# Patient Record
Sex: FEMALE | Race: WHITE | NOT HISPANIC OR LATINO | Employment: UNEMPLOYED
[De-identification: names, ages, dates, MRNs, and addresses within clinical notes are randomized per-mention and may not be internally consistent; named-entity substitution may affect disease eponyms.]

---

## 2017-04-01 ENCOUNTER — HOSPITAL (OUTPATIENT)
Dept: OTHER | Age: 57
End: 2017-04-01
Attending: FAMILY MEDICINE

## 2017-04-01 ENCOUNTER — DIAGNOSTIC TRANS (OUTPATIENT)
Dept: OTHER | Age: 57
End: 2017-04-01

## 2017-04-01 LAB
ALBUMIN SERPL-MCNC: 3.8 GM/DL (ref 3.6–5.1)
ALBUMIN/GLOB SERPL: 1 {RATIO} (ref 1–2.4)
ALP SERPL-CCNC: 71 UNIT/L (ref 45–117)
ALT SERPL-CCNC: 20 UNIT/L
ANALYZER ANC (IANC): ABNORMAL
ANION GAP SERPL CALC-SCNC: 9 MMOL/L (ref 10–20)
APTT PPP: 28 SECONDS (ref 22–30)
APTT PPP: NORMAL S
AST SERPL-CCNC: 14 UNIT/L
BASOPHILS # BLD: 0 THOUSAND/MCL (ref 0–0.3)
BASOPHILS NFR BLD: 0 %
BILIRUB SERPL-MCNC: 0.5 MG/DL (ref 0.2–1)
BNP SERPL-MCNC: 64 PG/ML
BUN SERPL-MCNC: 10 MG/DL (ref 6–20)
BUN/CREAT SERPL: 13 (ref 7–25)
CALCIUM SERPL-MCNC: 10.1 MG/DL (ref 8.4–10.2)
CHLORIDE: 105 MMOL/L (ref 98–107)
CO2 SERPL-SCNC: 28 MMOL/L (ref 21–32)
CREAT SERPL-MCNC: 0.76 MG/DL (ref 0.51–0.95)
DIFFERENTIAL METHOD BLD: ABNORMAL
EOSINOPHIL # BLD: 0.2 THOUSAND/MCL (ref 0.1–0.5)
EOSINOPHIL NFR BLD: 2 %
ERYTHROCYTE [DISTWIDTH] IN BLOOD: 12.8 % (ref 11–15)
GLOBULIN SER-MCNC: 3.7 GM/DL (ref 2–4)
GLUCOSE SERPL-MCNC: 110 MG/DL (ref 65–99)
HEMATOCRIT: 48.5 % (ref 36–46.5)
HGB BLD-MCNC: 16.1 GM/DL (ref 12–15.5)
INR PPP: 1.1
LYMPHOCYTES # BLD: 2.5 THOUSAND/MCL (ref 1–4)
LYMPHOCYTES NFR BLD: 26 %
MAGNESIUM SERPL-MCNC: 2.2 MG/DL (ref 1.7–2.4)
MCH RBC QN AUTO: 30.2 PG (ref 26–34)
MCHC RBC AUTO-ENTMCNC: 33.2 GM/DL (ref 32–36.5)
MCV RBC AUTO: 91 FL (ref 78–100)
MONOCYTES # BLD: 0.7 THOUSAND/MCL (ref 0.3–0.9)
MONOCYTES NFR BLD: 7 %
NEUTROPHILS # BLD: 6.5 THOUSAND/MCL (ref 1.8–7.7)
NEUTROPHILS NFR BLD: 65 %
NEUTS SEG NFR BLD: ABNORMAL %
PERCENT NRBC: ABNORMAL
PLATELET # BLD: 225 THOUSAND/MCL (ref 140–450)
POTASSIUM SERPL-SCNC: 4.2 MMOL/L (ref 3.4–5.1)
PROT SERPL-MCNC: 7.5 GM/DL (ref 6.4–8.2)
PROTHROMBIN TIME: 11.8 SECONDS (ref 9.7–11.8)
PROTHROMBIN TIME: NORMAL
RBC # BLD: 5.33 MILLION/MCL (ref 4–5.2)
SODIUM SERPL-SCNC: 138 MMOL/L (ref 135–145)
TROPONIN I SERPL HS-MCNC: <0.02 NG/ML
TROPONIN I SERPL HS-MCNC: <0.02 NG/ML
WBC # BLD: 9.9 THOUSAND/MCL (ref 4.2–11)

## 2017-04-02 ENCOUNTER — CHARTING TRANS (OUTPATIENT)
Dept: OTHER | Age: 57
End: 2017-04-02

## 2017-04-02 ENCOUNTER — IMAGING SERVICES (OUTPATIENT)
Dept: OTHER | Age: 57
End: 2017-04-02

## 2017-04-02 LAB
ALBUMIN SERPL-MCNC: 3.3 GM/DL (ref 3.6–5.1)
ALBUMIN/GLOB SERPL: 0.9 {RATIO} (ref 1–2.4)
ALP SERPL-CCNC: 63 UNIT/L (ref 45–117)
ALT SERPL-CCNC: 21 UNIT/L
ANALYZER ANC (IANC): NORMAL
ANION GAP SERPL CALC-SCNC: 15 MMOL/L (ref 10–20)
AST SERPL-CCNC: 17 UNIT/L
BASOPHILS # BLD: 0 THOUSAND/MCL (ref 0–0.3)
BASOPHILS NFR BLD: 0 %
BILIRUB SERPL-MCNC: 0.5 MG/DL (ref 0.2–1)
BUN SERPL-MCNC: 11 MG/DL (ref 6–20)
BUN/CREAT SERPL: 16 (ref 7–25)
CALCIUM SERPL-MCNC: 9.1 MG/DL (ref 8.4–10.2)
CHLORIDE: 105 MMOL/L (ref 98–107)
CO2 SERPL-SCNC: 23 MMOL/L (ref 21–32)
CREAT SERPL-MCNC: 0.69 MG/DL (ref 0.51–0.95)
DIFFERENTIAL METHOD BLD: NORMAL
EOSINOPHIL # BLD: 0.3 THOUSAND/MCL (ref 0.1–0.5)
EOSINOPHIL NFR BLD: 3 %
ERYTHROCYTE [DISTWIDTH] IN BLOOD: 12.8 % (ref 11–15)
GLOBULIN SER-MCNC: 3.6 GM/DL (ref 2–4)
GLUCOSE SERPL-MCNC: 157 MG/DL (ref 65–99)
HEMATOCRIT: 45.6 % (ref 36–46.5)
HGB BLD-MCNC: 14.7 GM/DL (ref 12–15.5)
LYMPHOCYTES # BLD: 3 THOUSAND/MCL (ref 1–4)
LYMPHOCYTES NFR BLD: 33 %
MCH RBC QN AUTO: 29.3 PG (ref 26–34)
MCHC RBC AUTO-ENTMCNC: 32.2 GM/DL (ref 32–36.5)
MCV RBC AUTO: 91 FL (ref 78–100)
MONOCYTES # BLD: 0.5 THOUSAND/MCL (ref 0.3–0.9)
MONOCYTES NFR BLD: 5 %
NEUTROPHILS # BLD: 5.3 THOUSAND/MCL (ref 1.8–7.7)
NEUTROPHILS NFR BLD: 59 %
NEUTS SEG NFR BLD: NORMAL %
PERCENT NRBC: NORMAL
PLATELET # BLD: 215 THOUSAND/MCL (ref 140–450)
POTASSIUM SERPL-SCNC: 3.7 MMOL/L (ref 3.4–5.1)
PROT SERPL-MCNC: 6.9 GM/DL (ref 6.4–8.2)
RBC # BLD: 5.01 MILLION/MCL (ref 4–5.2)
SODIUM SERPL-SCNC: 139 MMOL/L (ref 135–145)
WBC # BLD: 9.1 THOUSAND/MCL (ref 4.2–11)

## 2018-02-22 ENCOUNTER — HOSPITAL (OUTPATIENT)
Dept: OTHER | Age: 58
End: 2018-02-22
Attending: FAMILY MEDICINE

## 2018-03-12 ENCOUNTER — HOSPITAL (OUTPATIENT)
Dept: OTHER | Age: 58
End: 2018-03-12
Attending: FAMILY MEDICINE

## 2018-03-26 ENCOUNTER — HOSPITAL (OUTPATIENT)
Dept: OTHER | Age: 58
End: 2018-03-26
Attending: FAMILY MEDICINE

## 2018-05-06 ENCOUNTER — HOSPITAL (OUTPATIENT)
Dept: OTHER | Age: 58
End: 2018-05-06
Attending: EMERGENCY MEDICINE

## 2018-05-06 ENCOUNTER — LAB SERVICES (OUTPATIENT)
Dept: OTHER | Age: 58
End: 2018-05-06

## 2018-05-06 ENCOUNTER — IMAGING SERVICES (OUTPATIENT)
Dept: OTHER | Age: 58
End: 2018-05-06

## 2018-05-06 ENCOUNTER — DIAGNOSTIC TRANS (OUTPATIENT)
Dept: OTHER | Age: 58
End: 2018-05-06

## 2018-05-06 LAB
ANALYZER ANC (IANC): ABNORMAL
ANALYZER ANC (IANC): ABNORMAL
ANION GAP SERPL CALC-SCNC: 12 MMOL/L (ref 10–20)
ANION GAP SERPL CALC-SCNC: 12 MMOL/L (ref 10–20)
BASOPHILS # BLD: 0.1 K/MCL (ref 0–0.3)
BASOPHILS # BLD: 0.1 THOUSAND/MCL (ref 0–0.3)
BASOPHILS NFR BLD: 0 %
BASOPHILS NFR BLD: 0 %
BNP SERPL-MCNC: 11 PG/ML
BUN SERPL-MCNC: 17 MG/DL (ref 6–20)
BUN SERPL-MCNC: 17 MG/DL (ref 6–20)
BUN/CREAT SERPL: 22 (ref 7–25)
BUN/CREAT SERPL: 22 (ref 7–25)
CALCIUM SERPL-MCNC: 10.2 MG/DL (ref 8.4–10.2)
CALCIUM SERPL-MCNC: 10.2 MG/DL (ref 8.4–10.2)
CHLORIDE SERPL-SCNC: 101 MMOL/L (ref 98–107)
CHLORIDE: 101 MMOL/L (ref 98–107)
CO2 SERPL-SCNC: 29 MMOL/L (ref 21–32)
CO2 SERPL-SCNC: 29 MMOL/L (ref 21–32)
CREAT SERPL-MCNC: 0.78 MG/DL (ref 0.51–0.95)
CREAT SERPL-MCNC: 0.78 MG/DL (ref 0.51–0.95)
D DIMER PPP FEU-MCNC: 0.24 MG/L FEU
DIFFERENTIAL METHOD BLD: ABNORMAL
DIFFERENTIAL METHOD BLD: ABNORMAL
EOSINOPHIL # BLD: 0.5 K/MCL (ref 0.1–0.5)
EOSINOPHIL # BLD: 0.5 THOUSAND/MCL (ref 0.1–0.5)
EOSINOPHIL NFR BLD: 3 %
EOSINOPHIL NFR BLD: 3 %
ERYTHROCYTE [DISTWIDTH] IN BLOOD: 13.3 % (ref 11–15)
ERYTHROCYTE [DISTWIDTH] IN BLOOD: 13.3 % (ref 11–15)
GLUCOSE SERPL-MCNC: 123 MG/DL (ref 65–99)
GLUCOSE SERPL-MCNC: 123 MG/DL (ref 65–99)
HEMATOCRIT: 44.6 % (ref 36–46.5)
HEMATOCRIT: 44.6 % (ref 36–46.5)
HEMOGLOBIN: 15 G/DL (ref 12–15.5)
HGB BLD-MCNC: 15 GM/DL (ref 12–15.5)
IMM GRANULOCYTES # BLD AUTO: 0.1 K/MCL (ref 0–0.2)
IMM GRANULOCYTES # BLD AUTO: 0.1 THOUSAND/MCL (ref 0–0.2)
IMM GRANULOCYTES NFR BLD: 0 %
IMM GRANULOCYTES NFR BLD: 0 %
LYMPHOCYTES # BLD: 1.8 K/MCL (ref 1–4)
LYMPHOCYTES # BLD: 1.8 THOUSAND/MCL (ref 1–4)
LYMPHOCYTES NFR BLD: 10 %
LYMPHOCYTES NFR BLD: 10 %
MCH RBC QN AUTO: 30 PG (ref 26–34)
MCHC RBC AUTO-ENTMCNC: 33.6 GM/DL (ref 32–36.5)
MCV RBC AUTO: 89.2 FL (ref 78–100)
MEAN CORPUSCULAR HEMOGLOBIN: 30 PG (ref 26–34)
MEAN CORPUSCULAR HGB CONC: 33.6 G/DL (ref 32–36.5)
MEAN CORPUSCULAR VOLUME: 89.2 FL (ref 78–100)
MONOCYTES # BLD: 0.9 K/MCL (ref 0.3–0.9)
MONOCYTES # BLD: 0.9 THOUSAND/MCL (ref 0.3–0.9)
MONOCYTES NFR BLD: 5 %
MONOCYTES NFR BLD: 5 %
NEUTROPHILS # BLD: 15.3 K/MCL (ref 1.8–7.7)
NEUTROPHILS # BLD: 15.3 THOUSAND/MCL (ref 1.8–7.7)
NEUTROPHILS NFR BLD: 82 %
NEUTROPHILS NFR BLD: 82 %
NEUTS SEG NFR BLD: ABNORMAL
NEUTS SEG NFR BLD: ABNORMAL %
NRBC (NRBCRE): 0 %
PERCENT NRBC: 0 %
PLATELET # BLD: 301 THOUSAND/MCL (ref 140–450)
PLATELET COUNT: 301 K/MCL (ref 140–450)
POTASSIUM SERPL-SCNC: 3.3 MMOL/L (ref 3.4–5.1)
POTASSIUM SERPL-SCNC: 3.3 MMOL/L (ref 3.4–5.1)
RBC # BLD: 5 MILLION/MCL (ref 4–5.2)
RED CELL COUNT: 5 MIL/MCL (ref 4–5.2)
SODIUM SERPL-SCNC: 139 MMOL/L (ref 135–145)
SODIUM SERPL-SCNC: 139 MMOL/L (ref 135–145)
TROPONIN I SERPL HS-MCNC: <0.02 NG/ML
TROPONIN I SERPL HS-MCNC: <0.02 NG/ML
WBC # BLD: 18.7 THOUSAND/MCL (ref 4.2–11)
WHITE BLOOD COUNT: 18.7 K/MCL (ref 4.2–11)

## 2018-08-21 ENCOUNTER — HOSPITAL (OUTPATIENT)
Dept: OTHER | Age: 58
End: 2018-08-21
Attending: GENERAL PRACTICE

## 2018-08-21 LAB
ALBUMIN SERPL-MCNC: 3.8 GM/DL (ref 3.6–5.1)
ALBUMIN/GLOB SERPL: 0.9 {RATIO} (ref 1–2.4)
ALP SERPL-CCNC: 67 UNIT/L (ref 45–117)
ALT SERPL-CCNC: 29 UNIT/L
ANALYZER ANC (IANC): ABNORMAL
ANION GAP SERPL CALC-SCNC: 8 MMOL/L (ref 10–20)
AST SERPL-CCNC: 19 UNIT/L
BASOPHILS # BLD: 0.1 THOUSAND/MCL (ref 0–0.3)
BASOPHILS NFR BLD: 0 %
BILIRUB SERPL-MCNC: 0.4 MG/DL (ref 0.2–1)
BUN SERPL-MCNC: 11 MG/DL (ref 6–20)
BUN/CREAT SERPL: 12 (ref 7–25)
CALCIUM SERPL-MCNC: 9.4 MG/DL (ref 8.4–10.2)
CHLORIDE: 107 MMOL/L (ref 98–107)
CO2 SERPL-SCNC: 27 MMOL/L (ref 21–32)
CREAT SERPL-MCNC: 0.88 MG/DL (ref 0.51–0.95)
D DIMER PPP FEU-MCNC: 0.27 MG/L FEU
DIFFERENTIAL METHOD BLD: ABNORMAL
EOSINOPHIL # BLD: 0.4 THOUSAND/MCL (ref 0.1–0.5)
EOSINOPHIL NFR BLD: 2 %
ERYTHROCYTE [DISTWIDTH] IN BLOOD: 13.7 % (ref 11–15)
GLOBULIN SER-MCNC: 4.2 GM/DL (ref 2–4)
GLUCOSE SERPL-MCNC: 117 MG/DL (ref 65–99)
HEMATOCRIT: 44.9 % (ref 36–46.5)
HGB BLD-MCNC: 14.9 GM/DL (ref 12–15.5)
IMM GRANULOCYTES # BLD AUTO: 0.1 THOUSAND/MCL (ref 0–0.2)
IMM GRANULOCYTES NFR BLD: 0 %
LYMPHOCYTES # BLD: 2.2 THOUSAND/MCL (ref 1–4)
LYMPHOCYTES NFR BLD: 12 %
MCH RBC QN AUTO: 29.7 PG (ref 26–34)
MCHC RBC AUTO-ENTMCNC: 33.2 GM/DL (ref 32–36.5)
MCV RBC AUTO: 89.6 FL (ref 78–100)
MONOCYTES # BLD: 0.9 THOUSAND/MCL (ref 0.3–0.9)
MONOCYTES NFR BLD: 5 %
NEUTROPHILS # BLD: 14.9 THOUSAND/MCL (ref 1.8–7.7)
NEUTROPHILS NFR BLD: 81 %
NEUTS SEG NFR BLD: ABNORMAL %
NRBC (NRBCRE): 0 /100 WBC
NT-PROBNP SERPL-MCNC: 472 PG/ML
PLATELET # BLD: 236 THOUSAND/MCL (ref 140–450)
POTASSIUM SERPL-SCNC: 3.5 MMOL/L (ref 3.4–5.1)
PROCALCITONIN SERPL IA-MCNC: <0.05 NG/ML
PROT SERPL-MCNC: 8 GM/DL (ref 6.4–8.2)
RBC # BLD: 5.01 MILLION/MCL (ref 4–5.2)
SODIUM SERPL-SCNC: 139 MMOL/L (ref 135–145)
TROPONIN I SERPL HS-MCNC: <0.02 NG/ML
TROPONIN I SERPL HS-MCNC: <0.02 NG/ML
WBC # BLD: 18.6 THOUSAND/MCL (ref 4.2–11)

## 2018-08-22 ENCOUNTER — DIAGNOSTIC TRANS (OUTPATIENT)
Dept: OTHER | Age: 58
End: 2018-08-22

## 2018-08-22 ENCOUNTER — IMAGING SERVICES (OUTPATIENT)
Dept: OTHER | Age: 58
End: 2018-08-22

## 2018-08-22 LAB
AMORPH SED URNS QL MICRO: ABNORMAL
ANALYZER ANC (IANC): ABNORMAL
ANION GAP SERPL CALC-SCNC: 11 MMOL/L (ref 10–20)
APPEARANCE UR: ABNORMAL
BACTERIA #/AREA URNS HPF: ABNORMAL /HPF
BASOPHILS # BLD: 0 THOUSAND/MCL (ref 0–0.3)
BASOPHILS NFR BLD: 0 %
BILIRUB UR QL: NEGATIVE
BUN SERPL-MCNC: 13 MG/DL (ref 6–20)
BUN/CREAT SERPL: 16 (ref 7–25)
CALCIUM SERPL-MCNC: 9.8 MG/DL (ref 8.4–10.2)
CAOX CRY URNS QL MICRO: ABNORMAL
CHLORIDE: 107 MMOL/L (ref 98–107)
CHOLEST SERPL-MCNC: 202 MG/DL
CHOLEST/HDLC SERPL: 2.9 {RATIO}
CO2 SERPL-SCNC: 25 MMOL/L (ref 21–32)
COLOR UR: YELLOW
CREAT SERPL-MCNC: 0.8 MG/DL (ref 0.51–0.95)
DIFFERENTIAL METHOD BLD: ABNORMAL
EOSINOPHIL # BLD: 0 THOUSAND/MCL (ref 0.1–0.5)
EOSINOPHIL NFR BLD: 0 %
EPITH CASTS #/AREA URNS LPF: ABNORMAL /[LPF]
ERYTHROCYTE [DISTWIDTH] IN BLOOD: 13.8 % (ref 11–15)
FATTY CASTS #/AREA URNS LPF: ABNORMAL /[LPF]
GLUCOSE SERPL-MCNC: 165 MG/DL (ref 65–99)
GLUCOSE UR-MCNC: 150 MG/DL
GRAN CASTS #/AREA URNS LPF: ABNORMAL /[LPF]
HDLC SERPL-MCNC: 69 MG/DL
HEMATOCRIT: 42.5 % (ref 36–46.5)
HGB BLD-MCNC: 13.7 GM/DL (ref 12–15.5)
HGB UR QL: ABNORMAL
HYALINE CASTS #/AREA URNS LPF: ABNORMAL /LPF (ref 0–5)
IMM GRANULOCYTES # BLD AUTO: 0.2 THOUSAND/MCL (ref 0–0.2)
IMM GRANULOCYTES NFR BLD: 1 %
KETONES UR-MCNC: NEGATIVE MG/DL
LDLC SERPL CALC-MCNC: 118 MG/DL
LEUKOCYTE ESTERASE UR QL STRIP: ABNORMAL
LYMPHOCYTES # BLD: 1.6 THOUSAND/MCL (ref 1–4)
LYMPHOCYTES NFR BLD: 7 %
MAGNESIUM SERPL-MCNC: 2.3 MG/DL (ref 1.7–2.4)
MCH RBC QN AUTO: 29 PG (ref 26–34)
MCHC RBC AUTO-ENTMCNC: 32.2 GM/DL (ref 32–36.5)
MCV RBC AUTO: 90 FL (ref 78–100)
MIXED CELL CASTS #/AREA URNS LPF: ABNORMAL /[LPF]
MONOCYTES # BLD: 0.2 THOUSAND/MCL (ref 0.3–0.9)
MONOCYTES NFR BLD: 1 %
MUCOUS THREADS URNS QL MICRO: PRESENT
NEUTROPHILS # BLD: 20.1 THOUSAND/MCL (ref 1.8–7.7)
NEUTROPHILS NFR BLD: 91 %
NEUTS SEG NFR BLD: ABNORMAL %
NITRITE UR QL: NEGATIVE
NONHDLC SERPL-MCNC: 133 MG/DL
NRBC (NRBCRE): 0 /100 WBC
PH UR: 6 UNIT (ref 5–7)
PLATELET # BLD: 208 THOUSAND/MCL (ref 140–450)
POTASSIUM SERPL-SCNC: 3.9 MMOL/L (ref 3.4–5.1)
PROT UR QL: NEGATIVE MG/DL
RBC # BLD: 4.72 MILLION/MCL (ref 4–5.2)
RBC #/AREA URNS HPF: ABNORMAL /HPF (ref 0–3)
RBC CASTS #/AREA URNS LPF: ABNORMAL /[LPF]
RENAL EPI CELLS #/AREA URNS HPF: ABNORMAL /[HPF]
SODIUM SERPL-SCNC: 139 MMOL/L (ref 135–145)
SP GR UR: 1.02 (ref 1–1.03)
SPECIMEN SOURCE: ABNORMAL
SPERM URNS QL MICRO: ABNORMAL
SQUAMOUS #/AREA URNS HPF: ABNORMAL /HPF (ref 0–5)
T VAGINALIS URNS QL MICRO: ABNORMAL
TRI-PHOS CRY URNS QL MICRO: ABNORMAL
TRIGLYCERIDE (TRIGP): 76 MG/DL
URATE CRY URNS QL MICRO: ABNORMAL
UROBILINOGEN UR QL: 0.2 MG/DL (ref 0–1)
WAXY CASTS #/AREA URNS LPF: ABNORMAL /[LPF]
WBC # BLD: 22.1 THOUSAND/MCL (ref 4.2–11)
WBC #/AREA URNS HPF: ABNORMAL /HPF (ref 0–5)
WBC CASTS #/AREA URNS LPF: ABNORMAL /[LPF]
YEAST HYPHAE URNS QL MICRO: ABNORMAL
YEAST URNS QL MICRO: ABNORMAL

## 2018-08-23 ENCOUNTER — IMAGING SERVICES (OUTPATIENT)
Dept: OTHER | Age: 58
End: 2018-08-23

## 2018-08-23 LAB
ANALYZER ANC (IANC): ABNORMAL
BASOPHILS # BLD: 0 THOUSAND/MCL (ref 0–0.3)
BASOPHILS NFR BLD: 0 %
DIFFERENTIAL METHOD BLD: ABNORMAL
EOSINOPHIL # BLD: 0 THOUSAND/MCL (ref 0.1–0.5)
EOSINOPHIL NFR BLD: 0 %
ERYTHROCYTE [DISTWIDTH] IN BLOOD: 14 % (ref 11–15)
HEMATOCRIT: 43.1 % (ref 36–46.5)
HGB BLD-MCNC: 13.7 GM/DL (ref 12–15.5)
IMM GRANULOCYTES # BLD AUTO: 0.3 THOUSAND/MCL (ref 0–0.2)
IMM GRANULOCYTES NFR BLD: 1 %
LYMPHOCYTES # BLD: 1.9 THOUSAND/MCL (ref 1–4)
LYMPHOCYTES NFR BLD: 7 %
MCH RBC QN AUTO: 28.9 PG (ref 26–34)
MCHC RBC AUTO-ENTMCNC: 31.8 GM/DL (ref 32–36.5)
MCV RBC AUTO: 90.9 FL (ref 78–100)
MONOCYTES # BLD: 0.8 THOUSAND/MCL (ref 0.3–0.9)
MONOCYTES NFR BLD: 3 %
NEUTROPHILS # BLD: 22.5 THOUSAND/MCL (ref 1.8–7.7)
NEUTROPHILS NFR BLD: 89 %
NEUTS SEG NFR BLD: ABNORMAL %
NRBC (NRBCRE): 0 /100 WBC
PLATELET # BLD: 249 THOUSAND/MCL (ref 140–450)
RBC # BLD: 4.74 MILLION/MCL (ref 4–5.2)
WBC # BLD: 25.5 THOUSAND/MCL (ref 4.2–11)

## 2019-02-21 ENCOUNTER — HOSPITAL (OUTPATIENT)
Dept: OTHER | Age: 59
End: 2019-02-21
Attending: EMERGENCY MEDICINE

## 2019-02-21 ENCOUNTER — DIAGNOSTIC TRANS (OUTPATIENT)
Dept: OTHER | Age: 59
End: 2019-02-21

## 2019-02-21 LAB
ALBUMIN SERPL-MCNC: 3.7 GM/DL (ref 3.6–5.1)
ALBUMIN/GLOB SERPL: 0.9 {RATIO} (ref 1–2.4)
ALP SERPL-CCNC: 65 UNIT/L (ref 45–117)
ALT SERPL-CCNC: 29 UNIT/L
ANALYZER ANC (IANC): ABNORMAL
ANION GAP SERPL CALC-SCNC: 13 MMOL/L (ref 10–20)
APTT PPP: 24 SECONDS (ref 22–32)
APTT PPP: NORMAL S
AST SERPL-CCNC: 31 UNIT/L
BASE DEFICIT BLDA-SCNC: 1 MMOL/L (ref 0–2)
BASE EXCESS BLDA CALC-SCNC: ABNORMAL MMOL/L
BASOPHILS # BLD: 0.1 THOUSAND/MCL (ref 0–0.3)
BASOPHILS NFR BLD: 0 %
BDY SITE: ABNORMAL
BILIRUB SERPL-MCNC: 0.6 MG/DL (ref 0.2–1)
BODY TEMPERATURE: 37 DEGREES
BUN SERPL-MCNC: 12 MG/DL (ref 6–20)
BUN/CREAT SERPL: 15 (ref 7–25)
CA-I BLD ISE-SCNC: 1.26 MMOL/L (ref 1.15–1.29)
CA-I BLDA-SCNC: 19 % (ref 15–23)
CALCIUM SERPL-MCNC: 9.1 MG/DL (ref 8.4–10.2)
CHLORIDE: 107 MMOL/L (ref 98–107)
CO2 SERPL-SCNC: 23 MMOL/L (ref 21–32)
COHGB MFR BLD: 1.5 %
CONDITION: ABNORMAL
CONDITION: ABNORMAL
CREAT SERPL-MCNC: 0.78 MG/DL (ref 0.51–0.95)
D DIMER PPP FEU-MCNC: 0.42 MG/L FEU
DIFFERENTIAL METHOD BLD: ABNORMAL
EOSINOPHIL # BLD: 0.4 THOUSAND/MCL (ref 0.1–0.5)
EOSINOPHIL NFR BLD: 2 %
ERYTHROCYTE [DISTWIDTH] IN BLOOD: 13.4 % (ref 11–15)
GLOBULIN SER-MCNC: 4 GM/DL (ref 2–4)
GLUCOSE SERPL-MCNC: 115 MG/DL (ref 65–99)
HCO3 BLDA-SCNC: 23 MMOL/L (ref 22–28)
HEMATOCRIT: 41.9 % (ref 36–46.5)
HGB BLD-MCNC: 13.9 GM/DL (ref 12–15.5)
HGB BLD-MCNC: 13.9 GM/DL (ref 12–15.5)
HOROWITZ INDEX BLD+IHG-RTO: ABNORMAL MM[HG]
IMM GRANULOCYTES # BLD AUTO: 0.1 THOUSAND/MCL (ref 0–0.2)
IMM GRANULOCYTES NFR BLD: 1 %
INR PPP: 1
LACTATE BLDA-MCNC: 1.9 MMOL/L
LACTATE BLDV-SCNC: 1.8 MMOL/L (ref 0–2)
LYMPHOCYTES # BLD: 2.4 THOUSAND/MCL (ref 1–4)
LYMPHOCYTES NFR BLD: 13 %
MAGNESIUM SERPL-MCNC: 2.1 MG/DL (ref 1.7–2.4)
MCH RBC QN AUTO: 29.5 PG (ref 26–34)
MCHC RBC AUTO-ENTMCNC: 33.2 GM/DL (ref 32–36.5)
MCV RBC AUTO: 89 FL (ref 78–100)
METHGB MFR BLD: 1.1 %
MONOCYTES # BLD: 0.9 THOUSAND/MCL (ref 0.3–0.9)
MONOCYTES NFR BLD: 5 %
NEUTROPHILS # BLD: 14.6 THOUSAND/MCL (ref 1.8–7.7)
NEUTROPHILS NFR BLD: 79 %
NEUTS SEG NFR BLD: ABNORMAL %
NRBC (NRBCRE): 0 /100 WBC
NT-PROBNP SERPL-MCNC: 249 PG/ML
OXYHGB MFR BLD: 96.8 % (ref 94–98)
PAO2 / FIO2 RATIO (RPFR): ABNORMAL
PCO2 BLDA: 36 MM HG (ref 32–45)
PH BLDA: 7.42 UNIT (ref 7.35–7.45)
PLATELET # BLD: 234 THOUSAND/MCL (ref 140–450)
PO2 BLDA: 127 MM HG (ref 83–108)
POTASSIUM BLD-SCNC: 3.1 MMOL/L (ref 3.4–5.1)
POTASSIUM SERPL-SCNC: 4 MMOL/L (ref 3.4–5.1)
PROT SERPL-MCNC: 7.7 GM/DL (ref 6.4–8.2)
PROTHROMBIN TIME: 10.4 SECONDS (ref 9.7–11.8)
PROTHROMBIN TIME: NORMAL
RBC # BLD: 4.71 MILLION/MCL (ref 4–5.2)
SAO2 % BLDA: 100 % (ref 95–99)
SODIUM BLD-SCNC: 138 MMOL/L (ref 135–145)
SODIUM SERPL-SCNC: 139 MMOL/L (ref 135–145)
TROPONIN I SERPL HS-MCNC: <0.02 NG/ML
TROPONIN I SERPL HS-MCNC: <0.02 NG/ML
TSH SERPL-ACNC: 2.23 MCUNIT/ML (ref 0.35–5)
WBC # BLD: 18.5 THOUSAND/MCL (ref 4.2–11)

## 2019-03-02 ENCOUNTER — DIAGNOSTIC TRANS (OUTPATIENT)
Dept: OTHER | Age: 59
End: 2019-03-02

## 2019-03-03 ENCOUNTER — HOSPITAL (OUTPATIENT)
Dept: OTHER | Age: 59
End: 2019-03-03

## 2019-03-03 ENCOUNTER — HOSPITAL (OUTPATIENT)
Dept: OTHER | Age: 59
End: 2019-03-03
Attending: HOSPITALIST

## 2019-03-03 LAB
ALBUMIN SERPL-MCNC: 3.6 GM/DL (ref 3.6–5.1)
ALBUMIN SERPL-MCNC: 3.7 GM/DL (ref 3.6–5.1)
ALBUMIN/GLOB SERPL: 0.9 {RATIO} (ref 1–2.4)
ALBUMIN/GLOB SERPL: 0.9 {RATIO} (ref 1–2.4)
ALP SERPL-CCNC: 67 UNIT/L (ref 45–117)
ALP SERPL-CCNC: 68 UNIT/L (ref 45–117)
ALT SERPL-CCNC: 32 UNIT/L
ALT SERPL-CCNC: 32 UNIT/L
ANALYZER ANC (IANC): ABNORMAL
ANALYZER ANC (IANC): ABNORMAL
ANION GAP SERPL CALC-SCNC: 10 MMOL/L (ref 10–20)
ANION GAP SERPL CALC-SCNC: 10 MMOL/L (ref 10–20)
AST SERPL-CCNC: 11 UNIT/L
AST SERPL-CCNC: 13 UNIT/L
BASE DEFICIT BLDA-SCNC: ABNORMAL MMOL/L
BASE EXCESS BLDA CALC-SCNC: 4 MMOL/L (ref 0–3)
BASOPHILS # BLD: 0 THOUSAND/MCL (ref 0–0.3)
BASOPHILS # BLD: 0.1 THOUSAND/MCL (ref 0–0.3)
BASOPHILS NFR BLD: 0 %
BASOPHILS NFR BLD: 0 %
BDY SITE: ABNORMAL
BILIRUB SERPL-MCNC: 0.6 MG/DL (ref 0.2–1)
BILIRUB SERPL-MCNC: 0.8 MG/DL (ref 0.2–1)
BODY TEMPERATURE: 37 DEGREES
BUN SERPL-MCNC: 12 MG/DL (ref 6–20)
BUN SERPL-MCNC: 16 MG/DL (ref 6–20)
BUN/CREAT SERPL: 17 (ref 7–25)
BUN/CREAT SERPL: 23 (ref 7–25)
CA-I BLD ISE-SCNC: 1.28 MMOL/L (ref 1.15–1.29)
CA-I BLDA-SCNC: 21 % (ref 15–23)
CALCIUM SERPL-MCNC: 9.7 MG/DL (ref 8.4–10.2)
CALCIUM SERPL-MCNC: 9.7 MG/DL (ref 8.4–10.2)
CHLORIDE: 105 MMOL/L (ref 98–107)
CHLORIDE: 105 MMOL/L (ref 98–107)
CO2 SERPL-SCNC: 26 MMOL/L (ref 21–32)
CO2 SERPL-SCNC: 28 MMOL/L (ref 21–32)
COHGB MFR BLD: 2.2 %
CONDITION: ABNORMAL
CONDITION: ABNORMAL
CREAT SERPL-MCNC: 0.68 MG/DL (ref 0.51–0.95)
CREAT SERPL-MCNC: 0.72 MG/DL (ref 0.51–0.95)
DIFFERENTIAL METHOD BLD: ABNORMAL
DIFFERENTIAL METHOD BLD: ABNORMAL
EOSINOPHIL # BLD: 0 THOUSAND/MCL (ref 0.1–0.5)
EOSINOPHIL # BLD: 1.3 THOUSAND/MCL (ref 0.1–0.5)
EOSINOPHIL NFR BLD: 0 %
EOSINOPHIL NFR BLD: 5 %
ERYTHROCYTE [DISTWIDTH] IN BLOOD: 14 % (ref 11–15)
ERYTHROCYTE [DISTWIDTH] IN BLOOD: 14.1 % (ref 11–15)
GLOBULIN SER-MCNC: 3.9 GM/DL (ref 2–4)
GLOBULIN SER-MCNC: 4.1 GM/DL (ref 2–4)
GLUCOSE BLDC GLUCOMTR-MCNC: 153 MG/DL (ref 65–99)
GLUCOSE SERPL-MCNC: 108 MG/DL (ref 65–99)
GLUCOSE SERPL-MCNC: 167 MG/DL (ref 65–99)
HCO3 BLDA-SCNC: 28 MMOL/L (ref 22–28)
HEMATOCRIT: 44.4 % (ref 36–46.5)
HEMATOCRIT: 45.8 % (ref 36–46.5)
HGB BLD-MCNC: 14.8 GM/DL (ref 12–15.5)
HGB BLD-MCNC: 14.9 GM/DL (ref 12–15.5)
HGB BLD-MCNC: 15.2 GM/DL (ref 12–15.5)
HOROWITZ INDEX BLD+IHG-RTO: ABNORMAL MM[HG]
IMM GRANULOCYTES # BLD AUTO: 0.2 THOUSAND/MCL (ref 0–0.2)
IMM GRANULOCYTES # BLD AUTO: 0.3 THOUSAND/MCL (ref 0–0.2)
IMM GRANULOCYTES NFR BLD: 1 %
IMM GRANULOCYTES NFR BLD: 1 %
LACTATE BLDA-MCNC: 1.3 MMOL/L
LYMPHOCYTES # BLD: 1.7 THOUSAND/MCL (ref 1–4)
LYMPHOCYTES # BLD: 2.8 THOUSAND/MCL (ref 1–4)
LYMPHOCYTES NFR BLD: 11 %
LYMPHOCYTES NFR BLD: 8 %
MAGNESIUM SERPL-MCNC: 2.2 MG/DL (ref 1.7–2.4)
MCH RBC QN AUTO: 29.7 PG (ref 26–34)
MCH RBC QN AUTO: 29.9 PG (ref 26–34)
MCHC RBC AUTO-ENTMCNC: 33.2 GM/DL (ref 32–36.5)
MCHC RBC AUTO-ENTMCNC: 33.3 GM/DL (ref 32–36.5)
MCV RBC AUTO: 89 FL (ref 78–100)
MCV RBC AUTO: 90.2 FL (ref 78–100)
METHGB MFR BLD: 0.5 %
MONOCYTES # BLD: 0.3 THOUSAND/MCL (ref 0.3–0.9)
MONOCYTES # BLD: 1.4 THOUSAND/MCL (ref 0.3–0.9)
MONOCYTES NFR BLD: 1 %
MONOCYTES NFR BLD: 6 %
NEUTROPHILS # BLD: 19.3 THOUSAND/MCL (ref 1.8–7.7)
NEUTROPHILS # BLD: 20.7 THOUSAND/MCL (ref 1.8–7.7)
NEUTROPHILS NFR BLD: 77 %
NEUTROPHILS NFR BLD: 90 %
NEUTS SEG NFR BLD: ABNORMAL %
NEUTS SEG NFR BLD: ABNORMAL %
NRBC (NRBCRE): 0 /100 WBC
NRBC (NRBCRE): 0 /100 WBC
NT-PROBNP SERPL-MCNC: 44 PG/ML
OXYHGB MFR BLD: 97.3 % (ref 94–98)
PAO2 / FIO2 RATIO (RPFR): 308 (ref 300–500)
PATHOLOGIST NAME: NORMAL
PCO2 BLDA: 39 MM HG (ref 32–45)
PH BLDA: 7.46 UNIT (ref 7.35–7.45)
PLATELET # BLD: 258 THOUSAND/MCL (ref 140–450)
PLATELET # BLD: 258 THOUSAND/MCL (ref 140–450)
PO2 BLDA: 185 MM HG (ref 83–108)
POTASSIUM BLD-SCNC: 3.6 MMOL/L (ref 3.4–5.1)
POTASSIUM SERPL-SCNC: 3.6 MMOL/L (ref 3.4–5.1)
POTASSIUM SERPL-SCNC: 3.8 MMOL/L (ref 3.4–5.1)
PROCALCITONIN SERPL IA-MCNC: <0.05 NG/ML
PROT SERPL-MCNC: 7.6 GM/DL (ref 6.4–8.2)
PROT SERPL-MCNC: 7.7 GM/DL (ref 6.4–8.2)
RBC # BLD: 4.99 MILLION/MCL (ref 4–5.2)
RBC # BLD: 5.08 MILLION/MCL (ref 4–5.2)
SAO2 % BLDA: 100 % (ref 95–99)
SMEAR/PATHOLOGY EVALUATION: NORMAL
SODIUM BLD-SCNC: 137 MMOL/L (ref 135–145)
SODIUM SERPL-SCNC: 137 MMOL/L (ref 135–145)
SODIUM SERPL-SCNC: 139 MMOL/L (ref 135–145)
TROPONIN I SERPL HS-MCNC: <0.02 NG/ML
TROPONIN I SERPL HS-MCNC: <0.02 NG/ML
WBC # BLD: 23.1 THOUSAND/MCL (ref 4.2–11)
WBC # BLD: 25 THOUSAND/MCL (ref 4.2–11)

## 2019-03-04 LAB
ALBUMIN SERPL-MCNC: 3.2 GM/DL (ref 3.6–5.1)
ALBUMIN/GLOB SERPL: 0.9 {RATIO} (ref 1–2.4)
ALP SERPL-CCNC: 63 UNIT/L (ref 45–117)
ALT SERPL-CCNC: 26 UNIT/L
ANALYZER ANC (IANC): ABNORMAL
ANION GAP SERPL CALC-SCNC: 10 MMOL/L (ref 10–20)
AST SERPL-CCNC: 10 UNIT/L
BASOPHILS # BLD: 0 THOUSAND/MCL (ref 0–0.3)
BASOPHILS NFR BLD: 0 %
BILIRUB SERPL-MCNC: 0.4 MG/DL (ref 0.2–1)
BUN SERPL-MCNC: 19 MG/DL (ref 6–20)
BUN/CREAT SERPL: 28 (ref 7–25)
CALCIUM SERPL-MCNC: 9 MG/DL (ref 8.4–10.2)
CHLORIDE: 106 MMOL/L (ref 98–107)
CO2 SERPL-SCNC: 27 MMOL/L (ref 21–32)
CREAT SERPL-MCNC: 0.67 MG/DL (ref 0.51–0.95)
DIFFERENTIAL METHOD BLD: ABNORMAL
EOSINOPHIL # BLD: 0 THOUSAND/MCL (ref 0.1–0.5)
EOSINOPHIL NFR BLD: 0 %
ERYTHROCYTE [DISTWIDTH] IN BLOOD: 14.3 % (ref 11–15)
GLOBULIN SER-MCNC: 3.6 GM/DL (ref 2–4)
GLUCOSE SERPL-MCNC: 136 MG/DL (ref 65–99)
HEMATOCRIT: 39.1 % (ref 36–46.5)
HGB BLD-MCNC: 13.2 GM/DL (ref 12–15.5)
IMM GRANULOCYTES # BLD AUTO: 0.3 THOUSAND/MCL (ref 0–0.2)
IMM GRANULOCYTES NFR BLD: 1 %
LYMPHOCYTES # BLD: 2.5 THOUSAND/MCL (ref 1–4)
LYMPHOCYTES NFR BLD: 9 %
MCH RBC QN AUTO: 30.3 PG (ref 26–34)
MCHC RBC AUTO-ENTMCNC: 33.8 GM/DL (ref 32–36.5)
MCV RBC AUTO: 89.9 FL (ref 78–100)
MONOCYTES # BLD: 1.4 THOUSAND/MCL (ref 0.3–0.9)
MONOCYTES NFR BLD: 5 %
NEUTROPHILS # BLD: 23.3 THOUSAND/MCL (ref 1.8–7.7)
NEUTROPHILS NFR BLD: 85 %
NEUTS SEG NFR BLD: ABNORMAL %
NRBC (NRBCRE): 0 /100 WBC
PLATELET # BLD: 208 THOUSAND/MCL (ref 140–450)
POTASSIUM SERPL-SCNC: 4 MMOL/L (ref 3.4–5.1)
PROT SERPL-MCNC: 6.8 GM/DL (ref 6.4–8.2)
RBC # BLD: 4.35 MILLION/MCL (ref 4–5.2)
SODIUM SERPL-SCNC: 139 MMOL/L (ref 135–145)
WBC # BLD: 27.7 THOUSAND/MCL (ref 4.2–11)

## 2019-04-26 ENCOUNTER — HOSPITAL (OUTPATIENT)
Dept: OTHER | Age: 59
End: 2019-04-26
Attending: INTERNAL MEDICINE

## 2019-04-26 ENCOUNTER — DIAGNOSTIC TRANS (OUTPATIENT)
Dept: OTHER | Age: 59
End: 2019-04-26

## 2019-04-26 ENCOUNTER — HOSPITAL (OUTPATIENT)
Dept: OTHER | Age: 59
End: 2019-04-26

## 2019-04-26 LAB
ALBUMIN SERPL-MCNC: 3.4 GM/DL (ref 3.6–5.1)
ALBUMIN/GLOB SERPL: 1 {RATIO} (ref 1–2.4)
ALP SERPL-CCNC: 67 UNIT/L (ref 45–117)
ALT SERPL-CCNC: 23 UNIT/L
ANALYZER ANC (IANC): ABNORMAL
ANALYZER ANC (IANC): ABNORMAL
ANION GAP SERPL CALC-SCNC: 12 MMOL/L (ref 10–20)
ANION GAP SERPL CALC-SCNC: 8 MMOL/L (ref 10–20)
AST SERPL-CCNC: 13 UNIT/L
BASOPHILS # BLD: 0.1 THOUSAND/MCL (ref 0–0.3)
BASOPHILS NFR BLD: 0 %
BILIRUB SERPL-MCNC: 0.5 MG/DL (ref 0.2–1)
BUN SERPL-MCNC: 10 MG/DL (ref 6–20)
BUN SERPL-MCNC: 8 MG/DL (ref 6–20)
BUN/CREAT SERPL: 12 (ref 7–25)
BUN/CREAT SERPL: 14 (ref 7–25)
CALCIUM SERPL-MCNC: 8.8 MG/DL (ref 8.4–10.2)
CALCIUM SERPL-MCNC: 9.2 MG/DL (ref 8.4–10.2)
CHLORIDE: 108 MMOL/L (ref 98–107)
CHLORIDE: 110 MMOL/L (ref 98–107)
CO2 SERPL-SCNC: 22 MMOL/L (ref 21–32)
CO2 SERPL-SCNC: 27 MMOL/L (ref 21–32)
CREAT SERPL-MCNC: 0.66 MG/DL (ref 0.51–0.95)
CREAT SERPL-MCNC: 0.72 MG/DL (ref 0.51–0.95)
DIFFERENTIAL METHOD BLD: ABNORMAL
EOSINOPHIL # BLD: 3.5 THOUSAND/MCL (ref 0.1–0.5)
EOSINOPHIL NFR BLD: 20 %
ERYTHROCYTE [DISTWIDTH] IN BLOOD: 13.3 % (ref 11–15)
ERYTHROCYTE [DISTWIDTH] IN BLOOD: 13.4 % (ref 11–15)
GLOBULIN SER-MCNC: 3.5 GM/DL (ref 2–4)
GLUCOSE BLDC GLUCOMTR-MCNC: 150 MG/DL (ref 65–99)
GLUCOSE BLDC GLUCOMTR-MCNC: 169 MG/DL (ref 65–99)
GLUCOSE BLDC GLUCOMTR-MCNC: 198 MG/DL (ref 65–99)
GLUCOSE SERPL-MCNC: 118 MG/DL (ref 65–99)
GLUCOSE SERPL-MCNC: 166 MG/DL (ref 65–99)
HEMATOCRIT: 42 % (ref 36–46.5)
HEMATOCRIT: 43.7 % (ref 36–46.5)
HGB BLD-MCNC: 13.7 GM/DL (ref 12–15.5)
HGB BLD-MCNC: 14.3 GM/DL (ref 12–15.5)
IGE SERPL-ACNC: 845 UNIT/ML
IMM GRANULOCYTES # BLD AUTO: 0.1 THOUSAND/MCL (ref 0–0.2)
IMM GRANULOCYTES NFR BLD: 1 %
LYMPHOCYTES # BLD: 3.4 THOUSAND/MCL (ref 1–4)
LYMPHOCYTES NFR BLD: 19 %
MAGNESIUM SERPL-MCNC: 2.2 MG/DL (ref 1.7–2.4)
MAGNESIUM SERPL-MCNC: 2.9 MG/DL (ref 1.7–2.4)
MCH RBC QN AUTO: 29.6 PG (ref 26–34)
MCH RBC QN AUTO: 29.7 PG (ref 26–34)
MCHC RBC AUTO-ENTMCNC: 32.6 GM/DL (ref 32–36.5)
MCHC RBC AUTO-ENTMCNC: 32.7 GM/DL (ref 32–36.5)
MCV RBC AUTO: 90.7 FL (ref 78–100)
MCV RBC AUTO: 90.9 FL (ref 78–100)
MONOCYTES # BLD: 1.1 THOUSAND/MCL (ref 0.3–0.9)
MONOCYTES NFR BLD: 6 %
NEUTROPHILS # BLD: 9.5 THOUSAND/MCL (ref 1.8–7.7)
NEUTROPHILS NFR BLD: 54 %
NEUTS SEG NFR BLD: ABNORMAL %
NRBC (NRBCRE): 0 /100 WBC
NRBC (NRBCRE): 0 /100 WBC
NT-PROBNP SERPL-MCNC: 88 PG/ML
PLATELET # BLD: 201 THOUSAND/MCL (ref 140–450)
PLATELET # BLD: 212 THOUSAND/MCL (ref 140–450)
POTASSIUM SERPL-SCNC: 3.5 MMOL/L (ref 3.4–5.1)
POTASSIUM SERPL-SCNC: 4.5 MMOL/L (ref 3.4–5.1)
PROT SERPL-MCNC: 6.9 GM/DL (ref 6.4–8.2)
RBC # BLD: 4.63 MILLION/MCL (ref 4–5.2)
RBC # BLD: 4.81 MILLION/MCL (ref 4–5.2)
SODIUM SERPL-SCNC: 138 MMOL/L (ref 135–145)
SODIUM SERPL-SCNC: 142 MMOL/L (ref 135–145)
TROPONIN I SERPL HS-MCNC: <0.02 NG/ML
WBC # BLD: 17.7 THOUSAND/MCL (ref 4.2–11)
WBC # BLD: 17.8 THOUSAND/MCL (ref 4.2–11)

## 2019-04-27 LAB
ANALYZER ANC (IANC): ABNORMAL
ANION GAP SERPL CALC-SCNC: 9 MMOL/L (ref 10–20)
BASOPHILS # BLD: 0 THOUSAND/MCL (ref 0–0.3)
BASOPHILS NFR BLD: 0 %
BUN SERPL-MCNC: 14 MG/DL (ref 6–20)
BUN/CREAT SERPL: 19 (ref 7–25)
CALCIUM SERPL-MCNC: 9.3 MG/DL (ref 8.4–10.2)
CHLORIDE: 106 MMOL/L (ref 98–107)
CO2 SERPL-SCNC: 26 MMOL/L (ref 21–32)
CREAT SERPL-MCNC: 0.73 MG/DL (ref 0.51–0.95)
DIFFERENTIAL METHOD BLD: ABNORMAL
EOSINOPHIL # BLD: 0 THOUSAND/MCL (ref 0.1–0.5)
EOSINOPHIL NFR BLD: 0 %
ERYTHROCYTE [DISTWIDTH] IN BLOOD: 13.6 % (ref 11–15)
GLUCOSE BLDC GLUCOMTR-MCNC: 147 MG/DL (ref 65–99)
GLUCOSE BLDC GLUCOMTR-MCNC: 147 MG/DL (ref 65–99)
GLUCOSE BLDC GLUCOMTR-MCNC: 148 MG/DL (ref 65–99)
GLUCOSE BLDC GLUCOMTR-MCNC: 161 MG/DL (ref 65–99)
GLUCOSE SERPL-MCNC: 150 MG/DL (ref 65–99)
HEMATOCRIT: 43.3 % (ref 36–46.5)
HGB BLD-MCNC: 13.8 GM/DL (ref 12–15.5)
IMM GRANULOCYTES # BLD AUTO: 0.3 THOUSAND/MCL (ref 0–0.2)
IMM GRANULOCYTES NFR BLD: 1 %
LYMPHOCYTES # BLD: 1.7 THOUSAND/MCL (ref 1–4)
LYMPHOCYTES NFR BLD: 8 %
MAGNESIUM SERPL-MCNC: 2.7 MG/DL (ref 1.7–2.4)
MCH RBC QN AUTO: 29.7 PG (ref 26–34)
MCHC RBC AUTO-ENTMCNC: 31.9 GM/DL (ref 32–36.5)
MCV RBC AUTO: 93.3 FL (ref 78–100)
MONOCYTES # BLD: 0.8 THOUSAND/MCL (ref 0.3–0.9)
MONOCYTES NFR BLD: 4 %
NEUTROPHILS # BLD: 18.5 THOUSAND/MCL (ref 1.8–7.7)
NEUTROPHILS NFR BLD: 87 %
NEUTS SEG NFR BLD: ABNORMAL %
NRBC (NRBCRE): 0 /100 WBC
PLATELET # BLD: 220 THOUSAND/MCL (ref 140–450)
POTASSIUM SERPL-SCNC: 4.3 MMOL/L (ref 3.4–5.1)
RBC # BLD: 4.64 MILLION/MCL (ref 4–5.2)
SODIUM SERPL-SCNC: 137 MMOL/L (ref 135–145)
WBC # BLD: 21.3 THOUSAND/MCL (ref 4.2–11)

## 2019-04-28 LAB
ANALYZER ANC (IANC): ABNORMAL
ANION GAP SERPL CALC-SCNC: 9 MMOL/L (ref 10–20)
BASOPHILS # BLD: 0 THOUSAND/MCL (ref 0–0.3)
BASOPHILS NFR BLD: 0 %
BUN SERPL-MCNC: 13 MG/DL (ref 6–20)
BUN/CREAT SERPL: 20 (ref 7–25)
CALCIUM SERPL-MCNC: 9.6 MG/DL (ref 8.4–10.2)
CHLORIDE: 106 MMOL/L (ref 98–107)
CO2 SERPL-SCNC: 26 MMOL/L (ref 21–32)
CREAT SERPL-MCNC: 0.65 MG/DL (ref 0.51–0.95)
DIFFERENTIAL METHOD BLD: ABNORMAL
EOSINOPHIL # BLD: 0 THOUSAND/MCL (ref 0.1–0.5)
EOSINOPHIL NFR BLD: 0 %
ERYTHROCYTE [DISTWIDTH] IN BLOOD: 13.7 % (ref 11–15)
GLUCOSE BLDC GLUCOMTR-MCNC: 131 MG/DL (ref 65–99)
GLUCOSE BLDC GLUCOMTR-MCNC: 143 MG/DL (ref 65–99)
GLUCOSE BLDC GLUCOMTR-MCNC: 155 MG/DL (ref 65–99)
GLUCOSE BLDC GLUCOMTR-MCNC: 181 MG/DL (ref 65–99)
GLUCOSE SERPL-MCNC: 146 MG/DL (ref 65–99)
HEMATOCRIT: 41.5 % (ref 36–46.5)
HGB BLD-MCNC: 13.5 GM/DL (ref 12–15.5)
IMM GRANULOCYTES # BLD AUTO: 0.3 THOUSAND/MCL (ref 0–0.2)
IMM GRANULOCYTES NFR BLD: 2 %
LYMPHOCYTES # BLD: 1.9 THOUSAND/MCL (ref 1–4)
LYMPHOCYTES NFR BLD: 10 %
MAGNESIUM SERPL-MCNC: 2.5 MG/DL (ref 1.7–2.4)
MCH RBC QN AUTO: 29.9 PG (ref 26–34)
MCHC RBC AUTO-ENTMCNC: 32.5 GM/DL (ref 32–36.5)
MCV RBC AUTO: 92 FL (ref 78–100)
MONOCYTES # BLD: 0.7 THOUSAND/MCL (ref 0.3–0.9)
MONOCYTES NFR BLD: 4 %
NEUTROPHILS # BLD: 15.8 THOUSAND/MCL (ref 1.8–7.7)
NEUTROPHILS NFR BLD: 84 %
NEUTS SEG NFR BLD: ABNORMAL %
NRBC (NRBCRE): 0 /100 WBC
PLATELET # BLD: 234 THOUSAND/MCL (ref 140–450)
POTASSIUM SERPL-SCNC: 4 MMOL/L (ref 3.4–5.1)
RBC # BLD: 4.51 MILLION/MCL (ref 4–5.2)
SODIUM SERPL-SCNC: 137 MMOL/L (ref 135–145)
WBC # BLD: 18.7 THOUSAND/MCL (ref 4.2–11)

## 2019-04-29 LAB
GLUCOSE BLDC GLUCOMTR-MCNC: 143 MG/DL (ref 65–99)
GLUCOSE BLDC GLUCOMTR-MCNC: 163 MG/DL (ref 65–99)
GLUCOSE BLDC GLUCOMTR-MCNC: 182 MG/DL (ref 65–99)
GLUCOSE BLDC GLUCOMTR-MCNC: 187 MG/DL (ref 65–99)

## 2020-10-29 ENCOUNTER — IMAGING SERVICES (OUTPATIENT)
Dept: MAMMOGRAPHY | Age: 60
End: 2020-10-29

## 2020-10-29 DIAGNOSIS — Z12.31 VISIT FOR SCREENING MAMMOGRAM: ICD-10-CM

## 2020-10-29 PROCEDURE — 77063 BREAST TOMOSYNTHESIS BI: CPT | Performed by: RADIOLOGY

## 2020-10-29 PROCEDURE — 77067 SCR MAMMO BI INCL CAD: CPT | Performed by: RADIOLOGY

## 2022-06-29 ENCOUNTER — IMAGING SERVICES (OUTPATIENT)
Dept: ULTRASOUND IMAGING | Age: 62
End: 2022-06-29
Attending: FAMILY MEDICINE

## 2022-06-29 ENCOUNTER — IMAGING SERVICES (OUTPATIENT)
Dept: MAMMOGRAPHY | Age: 62
End: 2022-06-29
Attending: FAMILY MEDICINE

## 2022-06-29 DIAGNOSIS — N64.4 BREAST PAIN: ICD-10-CM

## 2022-06-29 DIAGNOSIS — Z85.3 HISTORY OF BREAST CANCER: ICD-10-CM

## 2022-06-29 PROCEDURE — 76641 ULTRASOUND BREAST COMPLETE: CPT | Performed by: RADIOLOGY

## 2022-06-29 PROCEDURE — 77061 BREAST TOMOSYNTHESIS UNI: CPT | Performed by: RADIOLOGY

## 2022-06-29 PROCEDURE — 77065 DX MAMMO INCL CAD UNI: CPT | Performed by: RADIOLOGY

## 2024-01-05 PROBLEM — M19.90 ARTHRITIS: Status: ACTIVE | Noted: 2018-09-07

## 2024-01-05 PROBLEM — N64.4 BREAST PAIN, RIGHT: Status: ACTIVE | Noted: 2022-05-27

## 2024-01-05 PROBLEM — R19.7 DIARRHEA OF PRESUMED INFECTIOUS ORIGIN: Status: ACTIVE | Noted: 2023-11-14

## 2024-01-05 PROBLEM — I10 PRIMARY HYPERTENSION: Status: ACTIVE | Noted: 2021-06-02

## 2024-01-05 PROBLEM — E55.9 VITAMIN D DEFICIENCY: Status: ACTIVE | Noted: 2022-02-04

## 2024-01-05 PROBLEM — H01.002 BLEPHARITIS OF RIGHT LOWER EYELID: Status: ACTIVE | Noted: 2021-11-01

## 2024-01-05 PROBLEM — J41.0 SIMPLE CHRONIC BRONCHITIS (HCC): Status: ACTIVE | Noted: 2023-12-18

## 2024-01-05 PROBLEM — H00.012 HORDEOLUM EXTERNUM OF RIGHT LOWER EYELID: Status: ACTIVE | Noted: 2021-11-01

## 2024-01-05 PROBLEM — M54.9 CHRONIC BACK PAIN: Status: ACTIVE | Noted: 2023-12-03

## 2024-01-05 PROBLEM — I15.2 HYPERTENSION DUE TO ENDOCRINE DISORDER: Status: ACTIVE | Noted: 2022-02-04

## 2024-01-05 PROBLEM — C49.9 LEIOMYOSARCOMA (HCC): Status: ACTIVE | Noted: 2023-12-18

## 2024-01-05 PROBLEM — J44.1 COPD WITH ACUTE EXACERBATION (HCC): Status: ACTIVE | Noted: 2021-06-02

## 2024-01-05 PROBLEM — R05.8 PRODUCTIVE COUGH: Status: ACTIVE | Noted: 2023-12-15

## 2024-01-05 PROBLEM — G89.29 CHRONIC BACK PAIN: Status: ACTIVE | Noted: 2023-12-03

## 2024-01-05 PROBLEM — E78.5 HYPERLIPIDEMIA, UNSPECIFIED HYPERLIPIDEMIA TYPE: Status: ACTIVE | Noted: 2018-02-05

## 2024-01-05 PROBLEM — R53.1 GENERALIZED WEAKNESS: Status: ACTIVE | Noted: 2021-06-02

## 2024-01-05 PROBLEM — M85.80 OSTEOPENIA: Status: ACTIVE | Noted: 2021-10-15

## 2024-01-05 PROBLEM — E87.6 HYPOKALEMIA: Status: ACTIVE | Noted: 2023-11-15

## 2024-01-05 PROBLEM — R19.00 RETROPERITONEAL MASS: Status: ACTIVE | Noted: 2023-11-14

## 2024-01-05 PROBLEM — D72.829 LEUKOCYTOSIS: Status: ACTIVE | Noted: 2023-11-14

## 2024-01-05 PROBLEM — M54.12 CERVICAL RADICULOPATHY: Status: ACTIVE | Noted: 2018-02-05

## 2024-01-05 PROBLEM — F41.1 GAD (GENERALIZED ANXIETY DISORDER): Status: ACTIVE | Noted: 2023-04-20

## 2024-01-05 PROBLEM — R94.31 ABNORMAL EKG: Status: ACTIVE | Noted: 2021-06-02

## 2024-01-05 PROBLEM — G89.29 CHRONIC MIDLINE LOW BACK PAIN WITH LEFT-SIDED SCIATICA: Status: ACTIVE | Noted: 2023-03-31

## 2024-01-05 PROBLEM — D48.4: Status: ACTIVE | Noted: 2023-12-15

## 2024-01-05 PROBLEM — E03.9 HYPOTHYROIDISM, UNSPECIFIED TYPE: Status: ACTIVE | Noted: 2018-09-07

## 2024-01-05 PROBLEM — G47.00 INSOMNIA: Status: ACTIVE | Noted: 2022-01-26

## 2024-01-05 PROBLEM — E21.0 PRIMARY HYPERPARATHYROIDISM (HCC): Status: ACTIVE | Noted: 2022-02-04

## 2024-01-05 PROBLEM — M47.816 LUMBAR FACET ARTHROPATHY: Status: ACTIVE | Noted: 2023-06-23

## 2024-01-05 PROBLEM — M54.42 CHRONIC MIDLINE LOW BACK PAIN WITH LEFT-SIDED SCIATICA: Status: ACTIVE | Noted: 2023-03-31

## 2024-01-05 PROBLEM — D72.829 LEUKOCYTOSIS, UNSPECIFIED TYPE: Status: ACTIVE | Noted: 2018-09-07

## 2024-01-05 PROBLEM — E07.9 DISORDER OF THYROID: Status: ACTIVE | Noted: 2023-12-15

## 2024-01-05 PROBLEM — M54.16 LEFT LUMBAR RADICULOPATHY: Status: ACTIVE | Noted: 2023-03-31

## 2024-01-05 PROBLEM — J01.90 ACUTE SINUSITIS, RECURRENCE NOT SPECIFIED, UNSPECIFIED LOCATION: Status: ACTIVE | Noted: 2018-04-10

## 2024-01-07 NOTE — CONSULTS
[facility]        Patient Name:  Citlali Leung   YOB: 1960   Gender:  Female   Appt Date:  1/8/2024   Provider:  Christian Mary MD     PATIENT PROVIDERS  Referring Provider: Diana Dickey MD    Primary Care Provider:Janel Bearhekwube   Address: [address] [facility] [address]   Phone #: [phone]       CHIEF COMPLAINT  Chief Complaint   Patient presents with    Consult     Mass L pelvis         PROBLEMS  Reviewed   Patient Active Problem List   Diagnosis    Acute sinusitis, recurrence not specified, unspecified location    Arthritis    Bronchitis    Blepharitis of right lower eyelid    Breast pain, right    Chronic back pain    Chronic midline low back pain with left-sided sciatica    Cervical radiculopathy    Hypertension due to endocrine disorder    Hypokalemia    Insomnia    Left lumbar radiculopathy    Leiomyosarcoma (HCC)    Leukocytosis    Fibromyalgia    History of left breast cancer    Hyperlipidemia, unspecified hyperlipidemia type    Hypothyroidism, unspecified type    Leukocytosis, unspecified type    VANDA (generalized anxiety disorder)    Age-related osteoporosis without current pathological fracture    Abnormal EKG    COPD with acute exacerbation (HCC)    Diarrhea of presumed infectious origin    Disorder of thyroid    Generalized weakness    Hordeolum externum of right lower eyelid    Lumbar facet arthropathy    Neoplasm of uncertain behavior of peritoneum    Osteopenia    Primary hyperparathyroidism (HCC)    Primary hypertension    Productive cough    Retroperitoneal mass    Simple chronic bronchitis (HCC)    Vitamin D deficiency        History of Present Illness:  Patient is a 63 year old woman who is currently being referred for consideration of surgical oncology management of recently diagnosed leiomyosarcoma.  Patient has history of breast cancer in 2003, s/p surgery, RT, chemo and 5 years of tamoxifen.    11/14/2023: Patient began having left  lower abdominal pain with nausea and vomiting.  She went to the ED in November where CT abdomen pelvis was performed showing irregular soft tissue mass in the posterior left pelvis measuring 2.5 x 4.3 cm.  Mass is closely associated with the left internal iliac vessels and left S2 nerve root.    11/15/2023: MRI performed showing 3.0 x 4.2 x 3.6 cm heterogenously enhancing mass inseparable from the left internal iliac vessels and encasing left exiting S2 nerve root.   CA-125: 33.0  CEA: 2.5  CA 19-9: 4.6  CA 27.29: 11  CA 15-3: 8    11/16/2023: CT chest with small right upper lobe nodule.  No comparison.     11/17/2023: Patient underwent CT-guided biopsy of left pelvic mass --> spindle cell neoplasm with features of leiomyoma with atypical nuclei, atypical smooth muscle neoplasm, MSI stable 7    11/21/2023: Repeat CT abdomen pelvis with IV contrast confirms enhancing solid mass in left posterior pelvis measuring 4.3 cm concerning for neoplasm    12/1/2023: Repeat CT abdomen pelvis redemonstrating solid mass, no significant change from prior    12/3/2023: CT brain without contrast performed due to vomiting, WNL    12/15/2023: Patient seen by Dr Jamil [facility] Surg Onc and Dr Horton [facility] Heme [facility], tumor deemed unresectable, ER/HI negative. Recommended ablative dose proton therapy    12/18/2023: Re-admitted for N/V. Repeat CT A/P, stable mass  12/21/2023: EGD performed while admitted showing peptic appearing duodenitis and gastritis, and gallego's esophagus    Surgical history includes cholecystectomy, hysterectomy, L3-4 left hemilamectomy, and left mastectomy. Patient has a family history of daughter with breast cancer, son with colorectal cancer, and 3 maternal aunts with breast cancer. She had genetic testing outpatient 20 years ago when she was diagnosed with breast cancer. She was diagnosed in 2005.     She continues to have left leg and leg buttock pain. Nausea and vomiting have improved. Denies diarrhea or constipation.  No chest pain or shortness of breath. Complains of fingers \"locking up\" since diagnosis.      Vital Signs:  /90 (BP Location: Right arm, Patient Position: Sitting, Cuff Size: adult)   Pulse 84   Temp 97.7 °F (36.5 °C) (Temporal)   Resp 20   Ht 1.6 m (5' 3\")   Wt 92.6 kg (204 lb 3.2 oz)   BMI 36.17 kg/m²      Medications Reviewed:    Current Outpatient Medications:     acetaminophen 325 MG Oral Tab, Take 2 tablets (650 mg total) by mouth every 6 (six) hours as needed., Disp: , Rfl:     pantoprazole 40 MG Oral Tab EC, Take 1 tablet (40 mg total) by mouth., Disp: , Rfl:     DULoxetine 60 MG Oral Cap DR Particles, TAKE 1 CAPSULE BY MOUTH DAILY FOR CHRONIC MUSCLE OR BONE PAIN OR NERVE PAIN OR RECURRENT ANXIETY, Disp: , Rfl:     gabapentin 300 MG Oral Cap, Take 1 capsule (300 mg total) by mouth 3 (three) times daily., Disp: , Rfl:     HYDROmorphone 2 MG Oral Tab, Take 1 tablet (2 mg total) by mouth every 6 (six) hours as needed., Disp: , Rfl:     hydrOXYzine 25 MG Oral Tab, Take 1 tablet (25 mg total) by mouth., Disp: , Rfl:     ipratropium-albuterol 0.5-2.5 (3) MG/3ML Inhalation Solution, Inhale 3 mL into the lungs every 6 (six) hours as needed., Disp: , Rfl:     levothyroxine 175 MCG Oral Tab, Take 1 tablet (175 mcg total) by mouth., Disp: , Rfl:     telmisartan 80 MG Oral Tab, Take 0.5 tablets (40 mg total) by mouth daily., Disp: , Rfl:     Budesonide-Formoterol Fumarate (SYMBICORT) 160-4.5 MCG/ACT Inhalation Aerosol, Inhale 2 puffs into the lungs 2 (two) times daily., Disp: , Rfl:     HYDROmorphone 4 MG Oral Tab, , Disp: , Rfl:     tiZANidine 4 MG Oral Tab, TAKE 1 TABLET BY MOUTH EVERY 6 HOURS FOR UP TO 10 DAYS AS NEEDED FOR MUSCLE SPASM, Disp: , Rfl:     fentaNYL 12 MCG/HR Transdermal Patch 72 Hr, Place 1 patch onto the skin every 3 (three) days., Disp: , Rfl:     fentaNYL 25 MCG/HR Transdermal Patch 72 Hr, APPLY 1 PATCH TOPICALLY TO THE SKIN EVERY 72 HOURS FOR 6 DAYS, Disp: , Rfl:     Naloxone HCl 4  MG/0.1ML Nasal Liquid, SPRAY 1 SPRAY IN EACH NOSTRIL FOR OVERDOSE. MAY REPEAT EVERY 2-3 MINUTES IN ALTERNATING NOSTRILS UNTIL MEDICAL ASSISTANCE BECOMES AVAILABLE, Disp: , Rfl:     ondansetron 4 MG Oral Tablet Dispersible, DISSOLVE 1 TABLET ON THE TONGUE THREE TIMES DAILY FOR UP TO 10 DAYS AS NEEDED FOR NAUSEA OR VOMITING, Disp: , Rfl:     ondansetron 8 MG Oral Tablet Dispersible, 1 tablet (8 mg total) every 8 (eight) hours as needed for Nausea., Disp: , Rfl:      Allergies Reviewed:  Allergies   Allergen Reactions    Naproxen OTHER (SEE COMMENTS)    Other HIVES     Per the patient \"stomach medicine for ulcers\"    Radiology Contrast Iodinated Dyes ITCHING     DYE from CT Scan causes itching per Patient.    CT contrast, per patient CT was stopped and needed Benadryl during procedure. C/O itching   DYE from CT Scan causes itching per Patient.    Iodine ITCHING        History:  Reviewed:  No past medical history on file.   Reviewed:  Past Surgical History:   Procedure Laterality Date    Cholecystectomy      Hysterectomy      Mastectomy left      Other surgical history      left hemilaminectomy L3-L4      Reviewed Social History:  Social History     Socioeconomic History    Marital status:    Tobacco Use    Smoking status: Former     Packs/day: 1     Types: Cigarettes     Quit date:      Years since quittin.0    Smokeless tobacco: Never   Substance and Sexual Activity    Drug use: Not Currently     Types: Cannabis     Comment: jacklyn      Reviewed:  Family History   Problem Relation Age of Onset    Breast Cancer Daughter     Other (Colorectal Cancer) Son     Breast Cancer Maternal Aunt     Breast Cancer Maternal Aunt     Breast Cancer Maternal Aunt       Review of Systems:  GENERAL HEALTH: feels well, no fatigue.   RESPIRATORY: denies shortness of breath  CARDIOVASCULAR: denies chest pain  GI: denies nausea, vomiting, constipation, diarrhea; no rectal bleeding  GENITAL/: no blood in  urine  MUSCULOSKELETAL: + back pain  NEURO: no tingling, numbness, + weakness  ENDOCRINE: denies weight loss/gain  PSYCH: +anxious       Physical Examination:  Constitutional: NAD, in a wheelchair.  Eyes: Sclera: non-icteric.   Neck: Neck: supple.   Lymph Nodes: Lymph Nodes no cervical LAD, supraclavicular LAD, axillary LAD, or inguinal LAD.   Lungs: Normal respiratory effort.  Abdomen: Soft, nontender, nondistended, no masses.  Musculoskeletal: Extremities: no edema.   Skin: Inspection and palpation: no jaundice.      Document Review:  11/14/2023 CT A/P without contrast:  The uterus is surgically removed. Irregular soft tissue mass is noted in the posterior left pelvis on series 2 image 110 measuring 2.5 x 4.3 cm. Mass is closely associated with the left internal iliac vessels and left S2 nerve root. There is mild scattered calcified atherosclerotic plaque of the abdominal aorta. Spinal stimulator leads are partially visualized in the posterior epidural space. Mild degenerative changes noted of the lumbar spine.     11/15/2023 MRI pelvis W/ and W/O:  1. A 3.0 x 4.2 x 3.6 cm heterogeneously enhancing mass within left posterior pelvis abutting the left piriformis muscle as described in detail above and which is highly concerning for a malignant mass which is a diagnosis of exclusion.  Correlation with definite tissue diagnosis is recommended.  Please note that this mass is inseparable from the left internal iliac vessels and encases the left exiting S2 nerve root.     11/15/2023:  CA-125: 33.0  CEA: 2.5  CA 19-9: 4.6  CA 27.29: 11  CA 15-3: 8    11/16/2023 CT Chest:  Lungs: Nonspecific 3 mm pleural-based lung nodule in the right upper lobe seen on axial image 45/302.  Bibasilar pleural-parenchymal changes largest in the lower left lingula seen on axial image 159/302.  Measures 1.6 x 0.5 cm.  Was a continuation of the lungs.  No signs of focal lung consolidation, pulmonary edema, or pleural effusion.       11/17/2023  Pelvic Mass Pathology Initial:  Final Diagnosis    A.  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     B.  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       11/21/2023 CT A/P with contrast:  Imaging of the pelvis shows a heterogenous enhancing mass in the left posterior pelvis that measures 4.3 x 2.9 x 4.0 cm (301/105).  This mass abuts the left piriformis muscle and comes in close proximity to the left S2 sacral neural foramen.  There is no free fluid in the pelvis.  The uterus is absent.  There is otherwise no definite evidence of pelvic lymphadenopathy.     11/22/2023 Pelvic Mass Pathology 2nd Opinion:  Final Diagnosis    [facility], 9 slides, [id number], collected 11/17/2023:  A.  Pelvic mass, needle biopsy:  Atypical smooth muscle neoplasm.  See note.     B.  Pelvic mass, needle biopsy:  Atypical smooth muscle neoplasm.  See note.   Provided immunohistochemical stains show the tumor cells are strongly positive for smooth muscle actin and negative for S100 and c-kit.  Cell proliferation rate (Ki-67 index) is about 5% (low).   Overall, these pathologic features are consistent with atypical smooth muscle neoplasm.  The differential diagnosis includes leiomyoma with bizarre nuclei, smooth muscle tumor of uncertain biologic potential, and leiomyosarcoma.    Comment: The findings are consistent with smooth muscle tumor of non-Mullerian (non-uterine) derivation.      12/1/2023 CT A/P without contrast:  Imaging of the pelvis shows redemonstration of a soft tissue mass in the left posterior pelvis that measures 4.2 x 2.8 x 4.1 cm (2/107). This mass again abuts the left piriformis muscle and comes in close proximity to the left S2 sacral neural foramen (2/99).     12/3/2023 CT A/P with contrast:  Peritoneum: No ascites. No extraluminal air.  Again  visualized is a 2.8 x 4.0 cm enhancing soft tissue mass within left pelvis.     12/3/2023 CT Brain without contrast:  IMPRESSION:   No hemorrhage.  No acute intracranial process by NCCT.     12/18/2023 CT A/P without contrast:  No evidence of free fluid in the abdomen and pelvis.  No evidence of pneumoperitoneum.   There is a stable 2.9 x 2.7 cm soft tissue mass at the posterior aspect of the left pelvis. There is a stable spinal stimulator device within subcutaneous soft tissue of posterior left back with lead coursing along the epidural space starting at T12/L1.     Imaging representation:    12/21/2023 EGD Pathology:  Final Diagnosis    A.  Duodenum; biopsy:       Benign duodenal mucosa with features of peptic duodenitis     B.  Stomach; biopsy:       Benign gastric mucosa with superficial surface erosion, reactive and regenerative changes       No intestinal metaplasia/Cuellar's specialized epithelium identified     C.  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 Leiomyosarcoma (HCC)  C49.9    -Left pelvic  -Involving S2/S3 and internal iliac artery  Findings were discussed with patient at length.  Images were reviewed with her.  She will commence radiation therapy in [place] which I agree with.  Tumor may be potentially resectable and will further discuss at our upcoming multidisciplinary tumor board and with our neurosurgery colleagues.  In addition, may consider irreversible electroporation as an 'out of the box' option.    2.  Primary hyperparathyroidism.  -Per PCP    3.  Low back pain  -Pump in place.  -May be related to sarcoma diagnosis; however history is much longer.  -There is associated weakness; uses wheelchair.  -If surgery to be undertaken, would  consider prehabilitation.    4.  History of breast cancer  -[place]         Follow Up:  Following radiation treatment with reimaging.       Electronically Signed by:    Christian Mary MD FACS  Chief of Surgical Oncology, [facility]  Professor of Surgery, [facility]  [phone]

## 2024-01-08 ENCOUNTER — OFFICE VISIT (OUTPATIENT)
Dept: SURGERY | Facility: CLINIC | Age: 64
End: 2024-01-08
Payer: COMMERCIAL

## 2024-01-08 ENCOUNTER — EXTERNAL RECORD (OUTPATIENT)
Dept: HEALTH INFORMATION MANAGEMENT | Facility: OTHER | Age: 64
End: 2024-01-08

## 2024-01-08 VITALS
BODY MASS INDEX: 36.18 KG/M2 | TEMPERATURE: 98 F | DIASTOLIC BLOOD PRESSURE: 90 MMHG | WEIGHT: 204.19 LBS | HEART RATE: 84 BPM | RESPIRATION RATE: 20 BRPM | SYSTOLIC BLOOD PRESSURE: 160 MMHG | HEIGHT: 63 IN

## 2024-01-08 DIAGNOSIS — M54.50 CHRONIC LEFT-SIDED LOW BACK PAIN, UNSPECIFIED WHETHER SCIATICA PRESENT: ICD-10-CM

## 2024-01-08 DIAGNOSIS — C48.1 SARCOMA OF PELVIC PERITONEUM (HCC): Primary | ICD-10-CM

## 2024-01-08 DIAGNOSIS — Z85.3 HISTORY OF LEFT BREAST CANCER: ICD-10-CM

## 2024-01-08 DIAGNOSIS — E21.0 PRIMARY HYPERPARATHYROIDISM (HCC): ICD-10-CM

## 2024-01-08 DIAGNOSIS — G89.29 CHRONIC LEFT-SIDED LOW BACK PAIN, UNSPECIFIED WHETHER SCIATICA PRESENT: ICD-10-CM

## 2024-01-08 PROBLEM — D72.829 LEUKOCYTOSIS, UNSPECIFIED TYPE: Status: RESOLVED | Noted: 2018-09-07 | Resolved: 2024-01-08

## 2024-01-08 PROBLEM — D72.829 LEUKOCYTOSIS: Status: RESOLVED | Noted: 2023-11-14 | Resolved: 2024-01-08

## 2024-01-12 ENCOUNTER — EXTERNAL RECORD (OUTPATIENT)
Dept: HEALTH INFORMATION MANAGEMENT | Facility: OTHER | Age: 64
End: 2024-01-12

## 2024-01-29 DIAGNOSIS — C48.1 SARCOMA OF PELVIC PERITONEUM (HCC): Primary | ICD-10-CM

## 2024-02-20 ENCOUNTER — TELEPHONE (OUTPATIENT)
Dept: SURGERY | Facility: CLINIC | Age: 64
End: 2024-02-20

## 2024-02-20 NOTE — TELEPHONE ENCOUNTER
I received a TE, pt's sister called requesting a pre op appt. I talked with Chanel ETIENNE because pt is not scheduled for surgery yet and does not need a pre op ppt at this time. Pt's sister clarified that she wanted an appt with Dr. Mary because pt will be finishing radiation this upcoming Monday and they wanted to discuss surgery. Chanel ETIENNE states that pt needs MRI as well before surgery. I let pt's sister know and she understands Will need MRI order, will talk with the team. I let pt's sister know we will keep in contact, she verbalized understanding.

## 2024-02-26 NOTE — TELEPHONE ENCOUNTER
Sister called back       PT's Last radiation is today    Needs pre cert completed and orders for [facility]    Sister wants to remind Team that the patient has a box for pain in her back

## 2024-02-28 ENCOUNTER — TELEPHONE (OUTPATIENT)
Dept: SURGERY | Facility: CLINIC | Age: 64
End: 2024-02-28

## 2024-02-28 DIAGNOSIS — C48.1 SARCOMA OF PELVIC PERITONEUM (HCC): Primary | ICD-10-CM

## 2024-02-28 NOTE — TELEPHONE ENCOUNTER
Patient's last dose of radiation on 2/26  States she is still having pain and her medical oncologist is prescribing pain medication  Informed her that our team would like her to have an MRI to assess the mass after radiation. She prefers to get it done at [facility]. Order placed and emailed to patient.     JAIMIE Fry

## 2024-03-05 ENCOUNTER — TELEPHONE (OUTPATIENT)
Dept: SURGERY | Facility: CLINIC | Age: 64
End: 2024-03-05

## 2024-03-05 NOTE — TELEPHONE ENCOUNTER
Hi,   Thank you for getting back to me so promptly!    There is actually an order for the MRI Lumbar-Sacrum W/WO already in the system, and the patient is scheduled for 3/20/24. The insurance has authorized this test.    I'm sorry for the confusion too. I thought a separate MRI Lumbar might show something different than the MRI Lumbar-Sac.    Anyway, the patient is good to go!    Thank you,  Ana María

## 2024-03-05 NOTE — TELEPHONE ENCOUNTER
Hi,    Please help with some clarification.    Patient is scheduled for an MRI Lumbar WO on 3/18/24 and is also scheduled for an  MRI Lumbar W & W/O on 3/20.    Which MRI Lumbar is the patient supposed to be having completed?    The MRI Lumbar WO has been authorized but can be canceled if the MRI Lumbar   W & W/O is the correct test, and a new request will be submitted.    Your help is much appreciated.    Thanks!

## 2024-03-27 ENCOUNTER — HOSPITAL ENCOUNTER (OUTPATIENT)
Dept: MRI IMAGING | Facility: HOSPITAL | Age: 64
Discharge: HOME | End: 2024-03-27
Payer: COMMERCIAL

## 2024-03-27 DIAGNOSIS — C48.1 SARCOMA OF PELVIC PERITONEUM (HCC): ICD-10-CM

## 2024-03-27 PROCEDURE — 72158 MRI LUMBAR SPINE W/O & W/DYE: CPT

## 2024-03-27 PROCEDURE — A9575 INJ GADOTERATE MEGLUMI 0.1ML: HCPCS

## 2024-03-27 RX ORDER — GADOTERATE MEGLUMINE 376.9 MG/ML
20 INJECTION INTRAVENOUS
Status: COMPLETED | OUTPATIENT
Start: 2024-03-27 | End: 2024-03-27

## 2024-03-27 RX ADMIN — GADOTERATE MEGLUMINE 20 ML: 376.9 INJECTION INTRAVENOUS at 16:31:00

## 2024-03-28 ENCOUNTER — HOSPITAL ENCOUNTER (OUTPATIENT)
Dept: MRI IMAGING | Facility: HOSPITAL | Age: 64
Discharge: HOME | End: 2024-03-28
Payer: COMMERCIAL

## 2024-03-28 DIAGNOSIS — C48.1 SARCOMA OF PELVIC PERITONEUM (HCC): ICD-10-CM

## 2024-03-28 PROCEDURE — 72197 MRI PELVIS W/O & W/DYE: CPT

## 2024-03-28 PROCEDURE — A9575 INJ GADOTERATE MEGLUMI 0.1ML: HCPCS

## 2024-03-28 RX ORDER — GADOTERATE MEGLUMINE 376.9 MG/ML
20 INJECTION INTRAVENOUS
Status: COMPLETED | OUTPATIENT
Start: 2024-03-28 | End: 2024-03-28

## 2024-03-28 RX ADMIN — GADOTERATE MEGLUMINE 20 ML: 376.9 INJECTION INTRAVENOUS at 15:22:00

## 2024-04-01 ENCOUNTER — HOSPITAL ENCOUNTER (OUTPATIENT)
Dept: CT IMAGING | Facility: HOSPITAL | Age: 64
Discharge: HOME | End: 2024-04-01
Payer: COMMERCIAL

## 2024-04-01 DIAGNOSIS — C48.1 SARCOMA OF PELVIC PERITONEUM (HCC): ICD-10-CM

## 2024-04-01 LAB
CREAT BLD-MCNC: 0.6 MG/DL
EGFRCR SERPLBLD CKD-EPI 2021: 102 ML/MIN/1.73M2 (ref 60–?)

## 2024-04-01 PROCEDURE — 74177 CT ABD & PELVIS W/CONTRAST: CPT

## 2024-04-01 PROCEDURE — 71250 CT THORAX DX C-: CPT

## 2024-04-01 PROCEDURE — 82565 ASSAY OF CREATININE: CPT

## 2024-04-09 NOTE — H&P
[facility]        Patient Name:  Citlali Leung   YOB: 1960   Gender:  Female   Appt Date:  4/10/2024   Provider:  Christian Mary MD     PATIENT PROVIDERS  Referring Provider: Diana Dickey MD     Primary Care Provider:Janel Bearhekwube   Address: [address] [facility] [address]   Phone #: [phone]       CHIEF COMPLAINT  Chief Complaint   Patient presents with    Follow - Up     Discuss surgery         PROBLEMS  Reviewed   Patient Active Problem List   Diagnosis    Acute sinusitis, recurrence not specified, unspecified location    Arthritis    Bronchitis    Blepharitis of right lower eyelid    Breast pain, right    Chronic back pain    Chronic midline low back pain with left-sided sciatica    Cervical radiculopathy    Hypertension due to endocrine disorder    Hypokalemia    Insomnia    Left lumbar radiculopathy    Sarcoma of pelvic peritoneum (HCC)    Fibromyalgia    History of left breast cancer    Hyperlipidemia, unspecified hyperlipidemia type    Hypothyroidism, unspecified type    VANDA (generalized anxiety disorder)    Age-related osteoporosis without current pathological fracture    Abnormal EKG    COPD with acute exacerbation (HCC)    Diarrhea of presumed infectious origin    Disorder of thyroid    Generalized weakness    Hordeolum externum of right lower eyelid    Lumbar facet arthropathy    Osteopenia    Primary hyperparathyroidism (HCC)    Primary hypertension    Productive cough    Simple chronic bronchitis (HCC)    Vitamin D deficiency        History of Present Illness:  Patient is a 63 year old woman who is currently being referred for consideration of surgical oncology management of recently diagnosed leiomyosarcoma.  Patient has history of breast cancer in 2003, s/p surgery, RT, chemo and 5 years of tamoxifen.     11/14/2023: Patient began having left lower abdominal pain with nausea and vomiting.  She went to the ED in November where CT abdomen  pelvis was performed showing irregular soft tissue mass in the posterior left pelvis measuring 2.5 x 4.3 cm.  Mass is closely associated with the left internal iliac vessels and left S2 nerve root.     11/15/2023: MRI performed showing 3.0 x 4.2 x 3.6 cm heterogenously enhancing mass inseparable from the left internal iliac vessels and encasing left exiting S2 nerve root.   CA-125: 33.0  CEA: 2.5  CA 19-9: 4.6  CA 27.29: 11  CA 15-3: 8     11/16/2023: CT chest with small right upper lobe nodule.  No comparison.      11/17/2023: Patient underwent CT-guided biopsy of left pelvic mass --> spindle cell neoplasm with features of leiomyoma with atypical nuclei, atypical smooth muscle neoplasm, MSI stable 7     11/21/2023: Repeat CT abdomen pelvis with IV contrast confirms enhancing solid mass in left posterior pelvis measuring 4.3 cm concerning for neoplasm     12/1/2023: Repeat CT abdomen pelvis redemonstrating solid mass, no significant change from prior     12/3/2023: CT brain without contrast performed due to vomiting, WNL     12/15/2023: Patient seen by Dr Jamil [facility] and Dr Horton [facility], tumor deemed unresectable, ER/ID negative. Recommended ablative dose proton therapy     12/18/2023: Re-admitted for N/V. Repeat CT A/P, stable mass  12/21/2023: EGD performed while admitted showing peptic appearing duodenitis and gastritis, and gallego's esophagus     Surgical history includes cholecystectomy, hysterectomy, L3-4 left hemilamectomy, and left mastectomy. Patient has a family history of daughter with breast cancer, son with colorectal cancer, and 3 maternal aunts with breast cancer. She had genetic testing outpatient 20 years ago when she was diagnosed with breast cancer. She was diagnosed in 2005.     Interval History:  Patient underwent radiation to the mass with Dr Gibson for 6 weeks which was finished on 2/26/2024. She underwent post radiation imaging, summarized below. She has been having increased  constipation. She has occasional numbness in the left leg. She is able to walk but develops significant pain after walking 1.5 blocks.     She is currently taking fentanyl patch 37.5 mg every 3 days, hydromorphone 4 mg every 4-6 hours, gabapentin 300 mg three times a day, tizanidine 4 mg nightly, duloxetine 60 mg, and tylenol 1000 mg daily. Patient also has an implanted pain pump.      Vital Signs:  BP (!) 162/90 (BP Location: Right arm, Patient Position: Sitting, Cuff Size: large)   Pulse 107   Temp 98.4 °F (36.9 °C) (Temporal)   Resp 20   Wt 85.7 kg (189 lb)   BMI 33.48 kg/m²      Medications Reviewed:    Current Outpatient Medications:     diphenhydrAMINE HCl 50 MG Oral Tab, Take one (1) 50mg tablet by mouth one (1) hour before the examination., Disp: 1 tablet, Rfl: 0    predniSONE 50 MG Oral Tab, Take one tablet (50mg) by mouth 13 hours, 7 hours, and 1 hour prior to examination as directed., Disp: 3 tablet, Rfl: 0    acetaminophen 325 MG Oral Tab, Take 2 tablets (650 mg total) by mouth every 6 (six) hours as needed., Disp: , Rfl:     DULoxetine 60 MG Oral Cap DR Particles, TAKE 1 CAPSULE BY MOUTH DAILY FOR CHRONIC MUSCLE OR BONE PAIN OR NERVE PAIN OR RECURRENT ANXIETY, Disp: , Rfl:     gabapentin 300 MG Oral Cap, Take 1 capsule (300 mg total) by mouth 3 (three) times daily., Disp: , Rfl:     HYDROmorphone 2 MG Oral Tab, Take 1 tablet (2 mg total) by mouth every 6 (six) hours as needed., Disp: , Rfl:     hydrOXYzine 25 MG Oral Tab, Take 1 tablet (25 mg total) by mouth., Disp: , Rfl:     ipratropium-albuterol 0.5-2.5 (3) MG/3ML Inhalation Solution, Inhale 3 mL into the lungs every 6 (six) hours as needed., Disp: , Rfl:     telmisartan 80 MG Oral Tab, Take 0.5 tablets (40 mg total) by mouth daily., Disp: , Rfl:     Budesonide-Formoterol Fumarate (SYMBICORT) 160-4.5 MCG/ACT Inhalation Aerosol, Inhale 2 puffs into the lungs 2 (two) times daily., Disp: , Rfl:     HYDROmorphone 4 MG Oral Tab, , Disp: , Rfl:      tiZANidine 4 MG Oral Tab, TAKE 1 TABLET BY MOUTH EVERY 6 HOURS FOR UP TO 10 DAYS AS NEEDED FOR MUSCLE SPASM, Disp: , Rfl:     fentaNYL 12 MCG/HR Transdermal Patch 72 Hr, Place 1 patch onto the skin every 3 (three) days., Disp: , Rfl:     fentaNYL 25 MCG/HR Transdermal Patch 72 Hr, APPLY 1 PATCH TOPICALLY TO THE SKIN EVERY 72 HOURS FOR 6 DAYS, Disp: , Rfl:     Naloxone HCl 4 MG/0.1ML Nasal Liquid, SPRAY 1 SPRAY IN EACH NOSTRIL FOR OVERDOSE. MAY REPEAT EVERY 2-3 MINUTES IN ALTERNATING NOSTRILS UNTIL MEDICAL ASSISTANCE BECOMES AVAILABLE, Disp: , Rfl:     ondansetron 4 MG Oral Tablet Dispersible, DISSOLVE 1 TABLET ON THE TONGUE THREE TIMES DAILY FOR UP TO 10 DAYS AS NEEDED FOR NAUSEA OR VOMITING, Disp: , Rfl:     ondansetron 8 MG Oral Tablet Dispersible, 1 tablet (8 mg total) every 8 (eight) hours as needed for Nausea., Disp: , Rfl:      Allergies Reviewed:  Allergies   Allergen Reactions    Naproxen OTHER (SEE COMMENTS)    Ondansetron OTHER (SEE COMMENTS)    Other HIVES     Per the patient \"stomach medicine for ulcers\"    Radiology Contrast Iodinated Dyes ITCHING     DYE from CT Scan causes itching per Patient.    CT contrast, per patient CT was stopped and needed Benadryl during procedure. C/O itching   DYE from CT Scan causes itching per Patient.    Iodine ITCHING    Omeprazole ITCHING        History:  Reviewed:  History reviewed. No pertinent past medical history.   Reviewed:  Past Surgical History:   Procedure Laterality Date    Cholecystectomy      Hysterectomy      Mastectomy left      Other surgical history      left hemilaminectomy L3-L4      Reviewed Social History:  Social History     Socioeconomic History    Marital status:    Tobacco Use    Smoking status: Former     Current packs/day: 0.00     Types: Cigarettes     Quit date:      Years since quittin.2    Smokeless tobacco: Never   Substance and Sexual Activity    Drug use: Not Currently     Types: Cannabis     Comment: brownies     Social  Determinants of Health     Food Insecurity: Low Risk  (2/5/2024)    Received from [facility]    Food Insecurity     Have there been times that your food ran out, and you didn't have money to get more?: No     Are there times that you worry that this might happen?: No   Transportation Needs: Low Risk  (2/5/2024)    Received from [facility]    Transportation Needs     Do you have trouble getting transportation to medical appointments?: No   Housing Stability: Low Risk  (2/5/2024)    Received from [facility]    Housing Stability     Are you worried that your electric, gas, oil, or water might be shut off?: No     Are you concerned about having a safe and reliable place to live?: No      Reviewed:  Family History   Problem Relation Age of Onset    Breast Cancer Daughter     Other (Colorectal Cancer) Son     Breast Cancer Maternal Aunt     Breast Cancer Maternal Aunt     Breast Cancer Maternal Aunt       Review of Systems:  GENERAL HEALTH: feels well, no fatigue.   RESPIRATORY: denies shortness of breath  CARDIOVASCULAR: denies chest pain  GI: denies nausea, vomiting, constipation, diarrhea; no rectal bleeding  GENITAL/: no blood in urine  MUSCULOSKELETAL: Uses wheelchair because of left lower extremity pain with ambulation.  NEURO: no tingling, numbness, weakness  ENDOCRINE: denies weight loss/gain  PSYCH: anxious       Physical Examination:  Constitutional:NAD.   Eyes: Sclera: non-icteric.   Lymph Nodes: Lymph Nodes no cervical LAD, supraclavicular LAD, axillary LAD, or inguinal LAD.   Lungs: Auscultation: breath sounds normal.   Cardiovascular: Heart Auscultation: RRR.   Abdomen: Inspection and Palpation: no masses, tenderness (no guarding, no rebound), or CVA tenderness and soft and non-distended. Liver: no hepatomegaly. Spleen: no splenomegaly.  Hernia: none palpable.   Musculoskeletal: Extremities: no edema. Weakness with left leg elevation compared to right side. Otherwise, plantarflexion and leg flexion strength intact bilaterally. Gross sensation intact.   Skin: Inspection and palpation: no jaundice.      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  Partially imaged 3.6 cm left presacral enhancing mass, with enhancement extending towards the left S2-3 and S3-4 neural foramina, with likely encroachment into the left S2-3 neural foramen with mass effect   on the exiting left S2 nerve root, not completely assessed as this is only partially imaged.  Contrast enhanced MRI of the sacral plexus would be more accurate for assessment of neural involvement, particularly at the non imaged S3-4 level.      The partially imaged enhancing presacral mass is in close proximity to the left superior aspect of the sacrum.  It is unclear if there is any osseous invasion, as it is only partially imaged.  This should be further assessed on contrast enhanced sacral   MRI.      Mild degenerative disease of the spine with up to mild spinal canal and mild-to-moderate neural foraminal narrowing, as detailed above.      Partially imaged neurostimulator device.      Radiation change involving the marrow of the sacrum.     MRI Sacrum:  CONCLUSION: Avidly enhancing 4.8 x 3.2 centimeter left-sided presacral mass with probable small amount of tumor extending into the left S2 neural foramen.  The left S1 and S2 nerve roots are indistinguishable from the tumor.  There is no tumor   involvement of the bony sacrum     CT A/P:  1. Heterogeneously enhancing 4 x 2.8 x 4.2 cm left pelvic/presacral space mass, which is located adjacent to the left piriformis muscle.  When accounting for differences in technique, this appears similar in size and morphology as compared with recent   prior sacral MR from March, 2024. This is compatible with  biopsy-proven spindle-cell neoplasm.  No definite metastatic disease within the abdomen/pelvis.   2. Fatty liver.   3. Cholecystectomy.   4. Hysterectomy.   5. Coronary and peripheral atherosclerosis.   6. Partially imaged left mastectomy.   7. Left lower quadrant spinal stimulator.   8. Lesser incidental findings as above.     CT Chest:  CONCLUSION:   1. Indeterminate noncalcified 6 mm nodule in the superior segment left lower lobe.  Differential considerations include a benign postinfectious nodule versus early pulmonary metastatic disease.  As this nodule is too small for adequate characterization   by PET-CT, a short interval 3 month follow-up chest CT is recommended to assess stability.   2. Other bilateral sub 4 mm pulmonary micronodules as detailed.  Although the small size of these remaining nodules suggests benign etiology, they should be reassessed on anticipated follow-up to ensure stability and exclude the less likely possibility   of additional pulmonary micrometastases.   3. Left mastectomy with left axillary surgical clips.   4. Multi-vessel coronary artery calcification.   5. Fatty liver.   6. Partially imaged spinal stimulator electrodes.   7. Lesser incidental findings as above.      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  A number of her family members were present.  The case was recently discussed at our multidisciplinary tumor board.  My recommendations reflect those of the tumor board.  I recommended radical resection.  Patient understands that I have requested surgical assistance from Dr. Jackson, neurosurgery given nerve root findings.  The surgical treatment matter including indications, rationale, and risks (including neuro deficit) was discussed in detail.  Patient agreed and understood.  Arrangements will be made to schedule the procedure.  Patient had ample time to ask questions.      COPD  -Stable.  -Medical clearance.    Primary hyperparathyroidism  -Most recent calcium within normal limits.            Electronically Signed by: Christian Mary MD

## 2024-04-09 NOTE — H&P (VIEW-ONLY)
[facility]        Patient Name:  Citlali Leung   YOB: 1960   Gender:  Female   Appt Date:  4/10/2024   Provider:  Christian Mary MD     PATIENT PROVIDERS  Referring Provider: Diana Dickey MD     Primary Care Provider:Janel Bearhekwube   Address: [address] [facility] [address]   Phone #: [phone]       CHIEF COMPLAINT  Chief Complaint   Patient presents with    Follow - Up     Discuss surgery         PROBLEMS  Reviewed   Patient Active Problem List   Diagnosis    Acute sinusitis, recurrence not specified, unspecified location    Arthritis    Bronchitis    Blepharitis of right lower eyelid    Breast pain, right    Chronic back pain    Chronic midline low back pain with left-sided sciatica    Cervical radiculopathy    Hypertension due to endocrine disorder    Hypokalemia    Insomnia    Left lumbar radiculopathy    Sarcoma of pelvic peritoneum (HCC)    Fibromyalgia    History of left breast cancer    Hyperlipidemia, unspecified hyperlipidemia type    Hypothyroidism, unspecified type    VANDA (generalized anxiety disorder)    Age-related osteoporosis without current pathological fracture    Abnormal EKG    COPD with acute exacerbation (HCC)    Diarrhea of presumed infectious origin    Disorder of thyroid    Generalized weakness    Hordeolum externum of right lower eyelid    Lumbar facet arthropathy    Osteopenia    Primary hyperparathyroidism (HCC)    Primary hypertension    Productive cough    Simple chronic bronchitis (HCC)    Vitamin D deficiency        History of Present Illness:  Patient is a 63 year old woman who is currently being referred for consideration of surgical oncology management of recently diagnosed leiomyosarcoma.  Patient has history of breast cancer in 2003, s/p surgery, RT, chemo and 5 years of tamoxifen.     11/14/2023: Patient began having left lower abdominal pain with nausea and vomiting.  She went to the ED in November where CT abdomen  pelvis was performed showing irregular soft tissue mass in the posterior left pelvis measuring 2.5 x 4.3 cm.  Mass is closely associated with the left internal iliac vessels and left S2 nerve root.     11/15/2023: MRI performed showing 3.0 x 4.2 x 3.6 cm heterogenously enhancing mass inseparable from the left internal iliac vessels and encasing left exiting S2 nerve root.   CA-125: 33.0  CEA: 2.5  CA 19-9: 4.6  CA 27.29: 11  CA 15-3: 8     11/16/2023: CT chest with small right upper lobe nodule.  No comparison.      11/17/2023: Patient underwent CT-guided biopsy of left pelvic mass --> spindle cell neoplasm with features of leiomyoma with atypical nuclei, atypical smooth muscle neoplasm, MSI stable 7     11/21/2023: Repeat CT abdomen pelvis with IV contrast confirms enhancing solid mass in left posterior pelvis measuring 4.3 cm concerning for neoplasm     12/1/2023: Repeat CT abdomen pelvis redemonstrating solid mass, no significant change from prior     12/3/2023: CT brain without contrast performed due to vomiting, WNL     12/15/2023: Patient seen by Dr Jamil [facility] Surg Onc and Dr Horton [facility] Heme On[facility], tumor deemed unresectable, ER/WY negative. Recommended ablative dose proton therapy     12/18/2023: Re-admitted for N/V. Repeat CT A/P, stable mass  12/21/2023: EGD performed while admitted showing peptic appearing duodenitis and gastritis, and gallego's esophagus     Surgical history includes cholecystectomy, hysterectomy, L3-4 left hemilamectomy, and left mastectomy. Patient has a family history of daughter with breast cancer, son with colorectal cancer, and 3 maternal aunts with breast cancer. She had genetic testing outpatient 20 years ago when she was diagnosed with breast cancer. She was diagnosed in 2005.     Interval History:  Patient underwent radiation to the mass with Dr Gibson for 6 weeks which was finished on 2/26/2024. She underwent post radiation imaging, summarized below. She has been having increased  constipation. She has occasional numbness in the left leg. She is able to walk but develops significant pain after walking 1.5 blocks.     She is currently taking fentanyl patch 37.5 mg every 3 days, hydromorphone 4 mg every 4-6 hours, gabapentin 300 mg three times a day, tizanidine 4 mg nightly, duloxetine 60 mg, and tylenol 1000 mg daily. Patient also has an implanted pain pump.      Vital Signs:  BP (!) 162/90 (BP Location: Right arm, Patient Position: Sitting, Cuff Size: large)   Pulse 107   Temp 98.4 °F (36.9 °C) (Temporal)   Resp 20   Wt 85.7 kg (189 lb)   BMI 33.48 kg/m²      Medications Reviewed:    Current Outpatient Medications:     diphenhydrAMINE HCl 50 MG Oral Tab, Take one (1) 50mg tablet by mouth one (1) hour before the examination., Disp: 1 tablet, Rfl: 0    predniSONE 50 MG Oral Tab, Take one tablet (50mg) by mouth 13 hours, 7 hours, and 1 hour prior to examination as directed., Disp: 3 tablet, Rfl: 0    acetaminophen 325 MG Oral Tab, Take 2 tablets (650 mg total) by mouth every 6 (six) hours as needed., Disp: , Rfl:     DULoxetine 60 MG Oral Cap DR Particles, TAKE 1 CAPSULE BY MOUTH DAILY FOR CHRONIC MUSCLE OR BONE PAIN OR NERVE PAIN OR RECURRENT ANXIETY, Disp: , Rfl:     gabapentin 300 MG Oral Cap, Take 1 capsule (300 mg total) by mouth 3 (three) times daily., Disp: , Rfl:     HYDROmorphone 2 MG Oral Tab, Take 1 tablet (2 mg total) by mouth every 6 (six) hours as needed., Disp: , Rfl:     hydrOXYzine 25 MG Oral Tab, Take 1 tablet (25 mg total) by mouth., Disp: , Rfl:     ipratropium-albuterol 0.5-2.5 (3) MG/3ML Inhalation Solution, Inhale 3 mL into the lungs every 6 (six) hours as needed., Disp: , Rfl:     telmisartan 80 MG Oral Tab, Take 0.5 tablets (40 mg total) by mouth daily., Disp: , Rfl:     Budesonide-Formoterol Fumarate (SYMBICORT) 160-4.5 MCG/ACT Inhalation Aerosol, Inhale 2 puffs into the lungs 2 (two) times daily., Disp: , Rfl:     HYDROmorphone 4 MG Oral Tab, , Disp: , Rfl:      tiZANidine 4 MG Oral Tab, TAKE 1 TABLET BY MOUTH EVERY 6 HOURS FOR UP TO 10 DAYS AS NEEDED FOR MUSCLE SPASM, Disp: , Rfl:     fentaNYL 12 MCG/HR Transdermal Patch 72 Hr, Place 1 patch onto the skin every 3 (three) days., Disp: , Rfl:     fentaNYL 25 MCG/HR Transdermal Patch 72 Hr, APPLY 1 PATCH TOPICALLY TO THE SKIN EVERY 72 HOURS FOR 6 DAYS, Disp: , Rfl:     Naloxone HCl 4 MG/0.1ML Nasal Liquid, SPRAY 1 SPRAY IN EACH NOSTRIL FOR OVERDOSE. MAY REPEAT EVERY 2-3 MINUTES IN ALTERNATING NOSTRILS UNTIL MEDICAL ASSISTANCE BECOMES AVAILABLE, Disp: , Rfl:     ondansetron 4 MG Oral Tablet Dispersible, DISSOLVE 1 TABLET ON THE TONGUE THREE TIMES DAILY FOR UP TO 10 DAYS AS NEEDED FOR NAUSEA OR VOMITING, Disp: , Rfl:     ondansetron 8 MG Oral Tablet Dispersible, 1 tablet (8 mg total) every 8 (eight) hours as needed for Nausea., Disp: , Rfl:      Allergies Reviewed:  Allergies   Allergen Reactions    Naproxen OTHER (SEE COMMENTS)    Ondansetron OTHER (SEE COMMENTS)    Other HIVES     Per the patient \"stomach medicine for ulcers\"    Radiology Contrast Iodinated Dyes ITCHING     DYE from CT Scan causes itching per Patient.    CT contrast, per patient CT was stopped and needed Benadryl during procedure. C/O itching   DYE from CT Scan causes itching per Patient.    Iodine ITCHING    Omeprazole ITCHING        History:  Reviewed:  History reviewed. No pertinent past medical history.   Reviewed:  Past Surgical History:   Procedure Laterality Date    Cholecystectomy      Hysterectomy      Mastectomy left      Other surgical history      left hemilaminectomy L3-L4      Reviewed Social History:  Social History     Socioeconomic History    Marital status:    Tobacco Use    Smoking status: Former     Current packs/day: 0.00     Types: Cigarettes     Quit date:      Years since quittin.2    Smokeless tobacco: Never   Substance and Sexual Activity    Drug use: Not Currently     Types: Cannabis     Comment: brownies     Social  Determinants of Health     Food Insecurity: Low Risk  (2/5/2024)    Received from [facility]    Food Insecurity     Have there been times that your food ran out, and you didn't have money to get more?: No     Are there times that you worry that this might happen?: No   Transportation Needs: Low Risk  (2/5/2024)    Received from [facility]    Transportation Needs     Do you have trouble getting transportation to medical appointments?: No   Housing Stability: Low Risk  (2/5/2024)    Received from [facility]    Housing Stability     Are you worried that your electric, gas, oil, or water might be shut off?: No     Are you concerned about having a safe and reliable place to live?: No      Reviewed:  Family History   Problem Relation Age of Onset    Breast Cancer Daughter     Other (Colorectal Cancer) Son     Breast Cancer Maternal Aunt     Breast Cancer Maternal Aunt     Breast Cancer Maternal Aunt       Review of Systems:  GENERAL HEALTH: feels well, no fatigue.   RESPIRATORY: denies shortness of breath  CARDIOVASCULAR: denies chest pain  GI: denies nausea, vomiting, constipation, diarrhea; no rectal bleeding  GENITAL/: no blood in urine  MUSCULOSKELETAL: Uses wheelchair because of left lower extremity pain with ambulation.  NEURO: no tingling, numbness, weakness  ENDOCRINE: denies weight loss/gain  PSYCH: anxious       Physical Examination:  Constitutional:NAD.   Eyes: Sclera: non-icteric.   Lymph Nodes: Lymph Nodes no cervical LAD, supraclavicular LAD, axillary LAD, or inguinal LAD.   Lungs: Auscultation: breath sounds normal.   Cardiovascular: Heart Auscultation: RRR.   Abdomen: Inspection and Palpation: no masses, tenderness (no guarding, no rebound), or CVA tenderness and soft and non-distended. Liver: no hepatomegaly. Spleen: no splenomegaly.  Hernia: none palpable.   Musculoskeletal: Extremities: no edema. Weakness with left leg elevation compared to right side. Otherwise, plantarflexion and leg flexion strength intact bilaterally. Gross sensation intact.   Skin: Inspection and palpation: no jaundice.      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  Partially imaged 3.6 cm left presacral enhancing mass, with enhancement extending towards the left S2-3 and S3-4 neural foramina, with likely encroachment into the left S2-3 neural foramen with mass effect   on the exiting left S2 nerve root, not completely assessed as this is only partially imaged.  Contrast enhanced MRI of the sacral plexus would be more accurate for assessment of neural involvement, particularly at the non imaged S3-4 level.      The partially imaged enhancing presacral mass is in close proximity to the left superior aspect of the sacrum.  It is unclear if there is any osseous invasion, as it is only partially imaged.  This should be further assessed on contrast enhanced sacral   MRI.      Mild degenerative disease of the spine with up to mild spinal canal and mild-to-moderate neural foraminal narrowing, as detailed above.      Partially imaged neurostimulator device.      Radiation change involving the marrow of the sacrum.     MRI Sacrum:  CONCLUSION: Avidly enhancing 4.8 x 3.2 centimeter left-sided presacral mass with probable small amount of tumor extending into the left S2 neural foramen.  The left S1 and S2 nerve roots are indistinguishable from the tumor.  There is no tumor   involvement of the bony sacrum     CT A/P:  1. Heterogeneously enhancing 4 x 2.8 x 4.2 cm left pelvic/presacral space mass, which is located adjacent to the left piriformis muscle.  When accounting for differences in technique, this appears similar in size and morphology as compared with recent   prior sacral MR from March, 2024. This is compatible with  biopsy-proven spindle-cell neoplasm.  No definite metastatic disease within the abdomen/pelvis.   2. Fatty liver.   3. Cholecystectomy.   4. Hysterectomy.   5. Coronary and peripheral atherosclerosis.   6. Partially imaged left mastectomy.   7. Left lower quadrant spinal stimulator.   8. Lesser incidental findings as above.     CT Chest:  CONCLUSION:   1. Indeterminate noncalcified 6 mm nodule in the superior segment left lower lobe.  Differential considerations include a benign postinfectious nodule versus early pulmonary metastatic disease.  As this nodule is too small for adequate characterization   by PET-CT, a short interval 3 month follow-up chest CT is recommended to assess stability.   2. Other bilateral sub 4 mm pulmonary micronodules as detailed.  Although the small size of these remaining nodules suggests benign etiology, they should be reassessed on anticipated follow-up to ensure stability and exclude the less likely possibility   of additional pulmonary micrometastases.   3. Left mastectomy with left axillary surgical clips.   4. Multi-vessel coronary artery calcification.   5. Fatty liver.   6. Partially imaged spinal stimulator electrodes.   7. Lesser incidental findings as above.      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  A number of her family members were present.  The case was recently discussed at our multidisciplinary tumor board.  My recommendations reflect those of the tumor board.  I recommended radical resection.  Patient understands that I have requested surgical assistance from Dr. Jackson, neurosurgery given nerve root findings.  The surgical treatment matter including indications, rationale, and risks (including neuro deficit) was discussed in detail.  Patient agreed and understood.  Arrangements will be made to schedule the procedure.  Patient had ample time to ask questions.      COPD  -Stable.  -Medical clearance.    Primary hyperparathyroidism  -Most recent calcium within normal limits.            Electronically Signed by: Christian Mary MD

## 2024-04-10 ENCOUNTER — OFFICE VISIT (OUTPATIENT)
Dept: SURGERY | Facility: CLINIC | Age: 64
End: 2024-04-10
Payer: COMMERCIAL

## 2024-04-10 VITALS
WEIGHT: 189 LBS | DIASTOLIC BLOOD PRESSURE: 90 MMHG | HEART RATE: 107 BPM | TEMPERATURE: 98 F | BODY MASS INDEX: 33 KG/M2 | SYSTOLIC BLOOD PRESSURE: 162 MMHG | RESPIRATION RATE: 20 BRPM

## 2024-04-10 DIAGNOSIS — C48.1 SARCOMA OF PELVIC PERITONEUM (HCC): Primary | ICD-10-CM

## 2024-04-10 DIAGNOSIS — J41.0 SIMPLE CHRONIC BRONCHITIS (HCC): ICD-10-CM

## 2024-04-10 DIAGNOSIS — J44.1 COPD WITH ACUTE EXACERBATION (HCC): ICD-10-CM

## 2024-04-10 DIAGNOSIS — E21.0 PRIMARY HYPERPARATHYROIDISM (HCC): ICD-10-CM

## 2024-04-10 PROCEDURE — 3077F SYST BP >= 140 MM HG: CPT | Performed by: SURGERY

## 2024-04-10 PROCEDURE — 99214 OFFICE O/P EST MOD 30 MIN: CPT | Performed by: SURGERY

## 2024-04-10 PROCEDURE — 3080F DIAST BP >= 90 MM HG: CPT | Performed by: SURGERY

## 2024-04-10 NOTE — PATIENT INSTRUCTIONS
Surgery:  Radical resection sarcoma left pelvis    Date of Surgery: 5/10/24    Hospital:    [address] [address]  Phone: [phone]    This is an inpatient procedure.  Use the provided Chlorhexadine surgical soap(instructions attached) to shower the night before and morning of your procedure.  Do not apply powders, creams, lotions or deodorant after showering.  Do not apply any kind of makeup and make sure to remove nail polish prior to your surgery.  For faster recovery from anesthesia and surgery please follow the instructions below regarding your pre-op diet:  12 hours prior to your surgery time you are to drink one 10oz bottle of Ensure Pre-Surgery Drink. You are to have NO solid food or water after 11pm the night before your surgery EXCEPT one additional 10oz bottle of Ensure Pre-Surgery Drink. You need to finish drinking this 4 hours prior to surgery time.  Take Tylenol 1000mg by mouth at the time of your second Ensure Pre-Surgery drink(4hrs prior to surgery time), unless instructed otherwise by your surgeon.   Bowel Prep: No   If you take Insulin contact your primary care physician for specific instructions regarding dosing around your surgery.  Do not drink alcohol or smoke tobacco products 24 hours prior to procedure.   Bring your picture ID and insurance card with you.  Wear comfortable clothing that can easily be removed. Preferably, something that zips, snaps, or buttons up the front.   You will be contacted by the hospital  for pre-admission COVID-19 testing and the day prior to your surgery to confirm details and give you specific instructions about when and where to arrive the day of your procedure.   If you are taking blood thinners including: Plavix, Eliquis, Xarelto, Coumadin, full strength Aspirin you will need to contact the prescribing provider for specific instructions on holding these medications for your procedure.  Inform your primary care  physician of your surgery and ask if him/her will need to see you prior to surgery.  If you develop symptoms of another illness prior to surgery please contact our office immediately.   If this is an inpatient surgery, attending the Surgical Oncology Pre-operative Education Class is strongly encouraged. Email [email or web address] to RSVP or for more class information.       Pre-Operative Testing  x CBC x CMP  BMP    PT, PTT, INR  UA x EKG    Chest X-Ray  Cardiac Clearance x H & P Medical Clearance     Christian Mary MD, FACS  Chief of Surgical Oncology  Co-Medical Director, Oncology Services  Professor of Surgery    For Dr. Mary's office: [phone]  Fax: [phone]  After hours you will reach the answering service    Pre-Admission [phone]  Central [phone]  Medical Records:   [phone]    Diagnosis: Sarcoma of pelvic peritoneum (HCC) (C48.1)     SURGEON: Dr. Christian Mary    LOCATION: [facility]    Type: Inpatient: Number of days: 5    Length of surgery: 3 hours  Anesthesia:general  Joint case with:  Dr. Phong Jackson  SA:  Yes   Special Equipment:   Special request:     Allergies:   Allergies   Allergen Reactions    Naproxen OTHER (SEE COMMENTS)    Ondansetron OTHER (SEE COMMENTS)    Other HIVES     Per the patient \"stomach medicine for ulcers\"    Radiology Contrast Iodinated Dyes ITCHING     DYE from CT Scan causes itching per Patient.    CT contrast, per patient CT was stopped and needed Benadryl during procedure. C/O itching   DYE from CT Scan causes itching per Patient.    Iodine ITCHING    Omeprazole ITCHING       Orders:  No  -Colon Bundle/Bowel Prep  Yes-Type and cross 2 units PRBC  Yes-Type and Screen  No  -Advanced Oncology Order Set (HIPEC)  Yes-Heparin Pre-Op  No  -Nuclear Med Injection  No  -Wire localization needed  Yes-Midline placement (HIPIC, Whipple, Esophagectomy, Liver)  Yes-Tylenol administration prior to surgery  Does patient have a pacemaker?: No     Yes-CHG Cloths (Manfred)    P.A.T. orders: CBC, CMP, and EKG     For RN use only: Medical Clearance

## 2024-04-12 ENCOUNTER — TELEPHONE (OUTPATIENT)
Dept: SURGERY | Facility: CLINIC | Age: 64
End: 2024-04-12

## 2024-04-12 DIAGNOSIS — C48.1 SARCOMA OF PELVIC PERITONEUM (HCC): Primary | ICD-10-CM

## 2024-04-12 RX ORDER — AMLODIPINE BESYLATE 2.5 MG/1
2.5 TABLET ORAL NIGHTLY
COMMUNITY

## 2024-04-12 NOTE — TELEPHONE ENCOUNTER
Calling pt in regards to scheduling surgery.  Informed pt that I have 5/10/24 available at [facility] with Dr. Mary.  Pt verbalized understanding and in agreement with date and location.  All questions answered.   Encouraged pt to call or [facility] message office with any other questions or concerns.

## 2024-04-15 ENCOUNTER — EKG ENCOUNTER (OUTPATIENT)
Dept: LAB | Facility: HOSPITAL | Age: 64
End: 2024-04-15
Attending: SURGERY
Payer: COMMERCIAL

## 2024-04-15 ENCOUNTER — LABORATORY ENCOUNTER (OUTPATIENT)
Dept: LAB | Facility: HOSPITAL | Age: 64
End: 2024-04-15
Attending: SURGERY
Payer: COMMERCIAL

## 2024-04-15 DIAGNOSIS — I10 HYPERTENSION: ICD-10-CM

## 2024-04-15 DIAGNOSIS — C48.1 SARCOMA OF PELVIC PERITONEUM (HCC): ICD-10-CM

## 2024-04-15 DIAGNOSIS — Z01.818 PRE-OP TESTING: ICD-10-CM

## 2024-04-15 LAB
ALBUMIN SERPL-MCNC: 3.2 G/DL (ref 3.4–5)
ALBUMIN/GLOB SERPL: 0.9 {RATIO} (ref 1–2)
ALP LIVER SERPL-CCNC: 77 U/L
ALT SERPL-CCNC: 18 U/L
ANION GAP SERPL CALC-SCNC: 6 MMOL/L (ref 0–18)
ANTIBODY SCREEN: NEGATIVE
AST SERPL-CCNC: 12 U/L (ref 15–37)
ATRIAL RATE: 75 BPM
BASOPHILS # BLD AUTO: 0.08 X10(3) UL (ref 0–0.2)
BASOPHILS NFR BLD AUTO: 0.9 %
BILIRUB SERPL-MCNC: 0.3 MG/DL (ref 0.1–2)
BUN BLD-MCNC: 7 MG/DL (ref 9–23)
CALCIUM BLD-MCNC: 10.5 MG/DL (ref 8.5–10.1)
CHLORIDE SERPL-SCNC: 111 MMOL/L (ref 98–112)
CO2 SERPL-SCNC: 25 MMOL/L (ref 21–32)
CREAT BLD-MCNC: 0.74 MG/DL
EGFRCR SERPLBLD CKD-EPI 2021: 92 ML/MIN/1.73M2 (ref 60–?)
EOSINOPHIL # BLD AUTO: 0.27 X10(3) UL (ref 0–0.7)
EOSINOPHIL NFR BLD AUTO: 3.1 %
ERYTHROCYTE [DISTWIDTH] IN BLOOD BY AUTOMATED COUNT: 13.3 %
FASTING STATUS PATIENT QL REPORTED: YES
GLOBULIN PLAS-MCNC: 3.6 G/DL (ref 2.8–4.4)
GLUCOSE BLD-MCNC: 110 MG/DL (ref 70–99)
HCT VFR BLD AUTO: 46 %
HGB BLD-MCNC: 14.6 G/DL
IMM GRANULOCYTES # BLD AUTO: 0.04 X10(3) UL (ref 0–1)
IMM GRANULOCYTES NFR BLD: 0.5 %
LYMPHOCYTES # BLD AUTO: 1.35 X10(3) UL (ref 1–4)
LYMPHOCYTES NFR BLD AUTO: 15.7 %
MCH RBC QN AUTO: 30 PG (ref 26–34)
MCHC RBC AUTO-ENTMCNC: 31.7 G/DL (ref 31–37)
MCV RBC AUTO: 94.7 FL
MONOCYTES # BLD AUTO: 0.61 X10(3) UL (ref 0.1–1)
MONOCYTES NFR BLD AUTO: 7.1 %
NEUTROPHILS # BLD AUTO: 6.27 X10 (3) UL (ref 1.5–7.7)
NEUTROPHILS # BLD AUTO: 6.27 X10(3) UL (ref 1.5–7.7)
NEUTROPHILS NFR BLD AUTO: 72.7 %
OSMOLALITY SERPL CALC.SUM OF ELEC: 293 MOSM/KG (ref 275–295)
P AXIS: 39 DEGREES
P-R INTERVAL: 136 MS
PLATELET # BLD AUTO: 222 10(3)UL (ref 150–450)
POTASSIUM SERPL-SCNC: 4.1 MMOL/L (ref 3.5–5.1)
PROT SERPL-MCNC: 6.8 G/DL (ref 6.4–8.2)
Q-T INTERVAL: 396 MS
QRS DURATION: 74 MS
QTC CALCULATION (BEZET): 442 MS
R AXIS: 12 DEGREES
RBC # BLD AUTO: 4.86 X10(6)UL
RH BLOOD TYPE: POSITIVE
SODIUM SERPL-SCNC: 142 MMOL/L (ref 136–145)
T AXIS: 47 DEGREES
VENTRICULAR RATE: 75 BPM
WBC # BLD AUTO: 8.6 X10(3) UL (ref 4–11)

## 2024-04-15 PROCEDURE — 80053 COMPREHEN METABOLIC PANEL: CPT

## 2024-04-15 PROCEDURE — 86901 BLOOD TYPING SEROLOGIC RH(D): CPT

## 2024-04-15 PROCEDURE — 85025 COMPLETE CBC W/AUTO DIFF WBC: CPT

## 2024-04-15 PROCEDURE — 86900 BLOOD TYPING SEROLOGIC ABO: CPT

## 2024-04-15 PROCEDURE — 93010 ELECTROCARDIOGRAM REPORT: CPT | Performed by: INTERNAL MEDICINE

## 2024-04-15 PROCEDURE — 93005 ELECTROCARDIOGRAM TRACING: CPT

## 2024-04-15 PROCEDURE — 36415 COLL VENOUS BLD VENIPUNCTURE: CPT

## 2024-04-15 PROCEDURE — 86850 RBC ANTIBODY SCREEN: CPT

## 2024-04-22 ENCOUNTER — ANESTHESIA EVENT (OUTPATIENT)
Dept: SURGERY | Facility: HOSPITAL | Age: 64
DRG: 826 | End: 2024-04-22
Payer: COMMERCIAL

## 2024-04-26 DIAGNOSIS — C48.1 SARCOMA OF PELVIC PERITONEUM (HCC): Primary | ICD-10-CM

## 2024-05-10 ENCOUNTER — ANESTHESIA (OUTPATIENT)
Dept: SURGERY | Facility: HOSPITAL | Age: 64
DRG: 826 | End: 2024-05-10
Payer: COMMERCIAL

## 2024-05-10 ENCOUNTER — HOSPITAL ENCOUNTER (INPATIENT)
Facility: HOSPITAL | Age: 64
LOS: 6 days | Discharge: HOME | DRG: 826 | End: 2024-05-16
Attending: SURGERY | Admitting: SURGERY

## 2024-05-10 DIAGNOSIS — I10 HYPERTENSION: ICD-10-CM

## 2024-05-10 DIAGNOSIS — C48.1 SARCOMA OF PELVIC PERITONEUM (HCC): Primary | ICD-10-CM

## 2024-05-10 DIAGNOSIS — Z01.818 PRE-OP TESTING: ICD-10-CM

## 2024-05-10 PROBLEM — G89.29 OTHER CHRONIC PAIN: Status: ACTIVE | Noted: 2023-12-03

## 2024-05-10 LAB — RH BLOOD TYPE: POSITIVE

## 2024-05-10 PROCEDURE — 3077F SYST BP >= 140 MM HG: CPT | Performed by: STUDENT IN AN ORGANIZED HEALTH CARE EDUCATION/TRAINING PROGRAM

## 2024-05-10 PROCEDURE — 76942 ECHO GUIDE FOR BIOPSY: CPT | Performed by: ANESTHESIOLOGY

## 2024-05-10 PROCEDURE — 0WBH0ZX EXCISION OF RETROPERITONEUM, OPEN APPROACH, DIAGNOSTIC: ICD-10-PCS | Performed by: SURGERY

## 2024-05-10 PROCEDURE — 3E0T3BZ INTRODUCTION OF ANESTHETIC AGENT INTO PERIPHERAL NERVES AND PLEXI, PERCUTANEOUS APPROACH: ICD-10-PCS | Performed by: ANESTHESIOLOGY

## 2024-05-10 PROCEDURE — 0DBU0ZX EXCISION OF OMENTUM, OPEN APPROACH, DIAGNOSTIC: ICD-10-PCS | Performed by: SURGERY

## 2024-05-10 PROCEDURE — 3008F BODY MASS INDEX DOCD: CPT | Performed by: STUDENT IN AN ORGANIZED HEALTH CARE EDUCATION/TRAINING PROGRAM

## 2024-05-10 PROCEDURE — 99254 IP/OBS CNSLTJ NEW/EST MOD 60: CPT | Performed by: STUDENT IN AN ORGANIZED HEALTH CARE EDUCATION/TRAINING PROGRAM

## 2024-05-10 PROCEDURE — 3079F DIAST BP 80-89 MM HG: CPT | Performed by: STUDENT IN AN ORGANIZED HEALTH CARE EDUCATION/TRAINING PROGRAM

## 2024-05-10 PROCEDURE — 0TN70ZZ RELEASE LEFT URETER, OPEN APPROACH: ICD-10-PCS | Performed by: SURGERY

## 2024-05-10 RX ORDER — FAMOTIDINE 20 MG/1
20 TABLET, FILM COATED ORAL 2 TIMES DAILY
Status: DISCONTINUED | OUTPATIENT
Start: 2024-05-10 | End: 2024-05-16

## 2024-05-10 RX ORDER — AMLODIPINE BESYLATE 2.5 MG/1
2.5 TABLET ORAL NIGHTLY
Status: DISCONTINUED | OUTPATIENT
Start: 2024-05-10 | End: 2024-05-15

## 2024-05-10 RX ORDER — METHADONE HYDROCHLORIDE 10 MG/ML
20 CONCENTRATE ORAL ONCE
Status: DISCONTINUED | OUTPATIENT
Start: 2024-05-10 | End: 2024-05-10 | Stop reason: HOSPADM

## 2024-05-10 RX ORDER — LIDOCAINE HYDROCHLORIDE 10 MG/ML
INJECTION, SOLUTION EPIDURAL; INFILTRATION; INTRACAUDAL; PERINEURAL AS NEEDED
Status: DISCONTINUED | OUTPATIENT
Start: 2024-05-10 | End: 2024-05-10 | Stop reason: SURG

## 2024-05-10 RX ORDER — AMLODIPINE BESYLATE 5 MG/1
5 TABLET ORAL DAILY
Status: DISCONTINUED | OUTPATIENT
Start: 2024-05-11 | End: 2024-05-15

## 2024-05-10 RX ORDER — FAMOTIDINE 10 MG/ML
20 INJECTION, SOLUTION INTRAVENOUS 2 TIMES DAILY
Status: DISCONTINUED | OUTPATIENT
Start: 2024-05-10 | End: 2024-05-16

## 2024-05-10 RX ORDER — BUPIVACAINE HYDROCHLORIDE 2.5 MG/ML
INJECTION, SOLUTION EPIDURAL; INFILTRATION; INTRACAUDAL AS NEEDED
Status: DISCONTINUED | OUTPATIENT
Start: 2024-05-10 | End: 2024-05-10 | Stop reason: HOSPADM

## 2024-05-10 RX ORDER — SODIUM CHLORIDE, SODIUM LACTATE, POTASSIUM CHLORIDE, CALCIUM CHLORIDE 600; 310; 30; 20 MG/100ML; MG/100ML; MG/100ML; MG/100ML
INJECTION, SOLUTION INTRAVENOUS CONTINUOUS
Status: DISCONTINUED | OUTPATIENT
Start: 2024-05-10 | End: 2024-05-15

## 2024-05-10 RX ORDER — HYDROMORPHONE HYDROCHLORIDE 1 MG/ML
1 INJECTION, SOLUTION INTRAMUSCULAR; INTRAVENOUS; SUBCUTANEOUS EVERY 2 HOUR PRN
Status: DISCONTINUED | OUTPATIENT
Start: 2024-05-10 | End: 2024-05-16

## 2024-05-10 RX ORDER — HYDROCODONE BITARTRATE AND ACETAMINOPHEN 5; 325 MG/1; MG/1
1 TABLET ORAL ONCE AS NEEDED
Status: DISCONTINUED | OUTPATIENT
Start: 2024-05-10 | End: 2024-05-10 | Stop reason: HOSPADM

## 2024-05-10 RX ORDER — NALOXONE HYDROCHLORIDE 0.4 MG/ML
0.08 INJECTION, SOLUTION INTRAMUSCULAR; INTRAVENOUS; SUBCUTANEOUS AS NEEDED
Status: DISCONTINUED | OUTPATIENT
Start: 2024-05-10 | End: 2024-05-10 | Stop reason: HOSPADM

## 2024-05-10 RX ORDER — HYDROMORPHONE HYDROCHLORIDE 1 MG/ML
0.4 INJECTION, SOLUTION INTRAMUSCULAR; INTRAVENOUS; SUBCUTANEOUS EVERY 5 MIN PRN
Status: DISCONTINUED | OUTPATIENT
Start: 2024-05-10 | End: 2024-05-10 | Stop reason: HOSPADM

## 2024-05-10 RX ORDER — GABAPENTIN 300 MG/1
300 CAPSULE ORAL 3 TIMES DAILY
Status: DISCONTINUED | OUTPATIENT
Start: 2024-05-10 | End: 2024-05-16

## 2024-05-10 RX ORDER — FLUTICASONE FUROATE AND VILANTEROL 200; 25 UG/1; UG/1
1 POWDER RESPIRATORY (INHALATION) DAILY
Status: DISCONTINUED | OUTPATIENT
Start: 2024-05-10 | End: 2024-05-16

## 2024-05-10 RX ORDER — DULOXETIN HYDROCHLORIDE 30 MG/1
60 CAPSULE, DELAYED RELEASE ORAL DAILY
Status: DISCONTINUED | OUTPATIENT
Start: 2024-05-11 | End: 2024-05-16

## 2024-05-10 RX ORDER — NALOXONE HYDROCHLORIDE 0.4 MG/ML
0.08 INJECTION, SOLUTION INTRAMUSCULAR; INTRAVENOUS; SUBCUTANEOUS
Status: DISCONTINUED | OUTPATIENT
Start: 2024-05-10 | End: 2024-05-12

## 2024-05-10 RX ORDER — MIDAZOLAM HYDROCHLORIDE 1 MG/ML
INJECTION INTRAMUSCULAR; INTRAVENOUS AS NEEDED
Status: DISCONTINUED | OUTPATIENT
Start: 2024-05-10 | End: 2024-05-10 | Stop reason: SURG

## 2024-05-10 RX ORDER — CEFAZOLIN SODIUM/WATER 2 G/20 ML
2 SYRINGE (ML) INTRAVENOUS ONCE
Status: COMPLETED | OUTPATIENT
Start: 2024-05-10 | End: 2024-05-10

## 2024-05-10 RX ORDER — HYDROCODONE BITARTRATE AND ACETAMINOPHEN 5; 325 MG/1; MG/1
2 TABLET ORAL ONCE AS NEEDED
Status: DISCONTINUED | OUTPATIENT
Start: 2024-05-10 | End: 2024-05-10 | Stop reason: HOSPADM

## 2024-05-10 RX ORDER — HYDRALAZINE HYDROCHLORIDE 20 MG/ML
5 INJECTION INTRAMUSCULAR; INTRAVENOUS EVERY 6 HOURS PRN
Status: DISCONTINUED | OUTPATIENT
Start: 2024-05-10 | End: 2024-05-16

## 2024-05-10 RX ORDER — HYDROMORPHONE HYDROCHLORIDE 1 MG/ML
0.2 INJECTION, SOLUTION INTRAMUSCULAR; INTRAVENOUS; SUBCUTANEOUS EVERY 5 MIN PRN
Status: DISCONTINUED | OUTPATIENT
Start: 2024-05-10 | End: 2024-05-10 | Stop reason: HOSPADM

## 2024-05-10 RX ORDER — SODIUM CHLORIDE 9 MG/ML
INJECTION, SOLUTION INTRAVENOUS CONTINUOUS
Status: DISCONTINUED | OUTPATIENT
Start: 2024-05-10 | End: 2024-05-12

## 2024-05-10 RX ORDER — PROCHLORPERAZINE EDISYLATE 5 MG/ML
5 INJECTION INTRAMUSCULAR; INTRAVENOUS EVERY 8 HOURS PRN
Status: DISCONTINUED | OUTPATIENT
Start: 2024-05-10 | End: 2024-05-10 | Stop reason: HOSPADM

## 2024-05-10 RX ORDER — HYDROXYZINE HYDROCHLORIDE 25 MG/1
25 TABLET, FILM COATED ORAL 2 TIMES DAILY PRN
Status: DISCONTINUED | OUTPATIENT
Start: 2024-05-10 | End: 2024-05-16

## 2024-05-10 RX ORDER — EPHEDRINE SULFATE 50 MG/ML
INJECTION INTRAVENOUS AS NEEDED
Status: DISCONTINUED | OUTPATIENT
Start: 2024-05-10 | End: 2024-05-10 | Stop reason: SURG

## 2024-05-10 RX ORDER — DEXAMETHASONE SODIUM PHOSPHATE 4 MG/ML
VIAL (ML) INJECTION AS NEEDED
Status: DISCONTINUED | OUTPATIENT
Start: 2024-05-10 | End: 2024-05-10 | Stop reason: SURG

## 2024-05-10 RX ORDER — ACETAMINOPHEN 10 MG/ML
1000 INJECTION, SOLUTION INTRAVENOUS EVERY 6 HOURS
Status: DISCONTINUED | OUTPATIENT
Start: 2024-05-10 | End: 2024-05-12

## 2024-05-10 RX ORDER — ACETAMINOPHEN 500 MG
1000 TABLET ORAL ONCE AS NEEDED
Status: DISCONTINUED | OUTPATIENT
Start: 2024-05-10 | End: 2024-05-10 | Stop reason: HOSPADM

## 2024-05-10 RX ORDER — IPRATROPIUM BROMIDE AND ALBUTEROL SULFATE 2.5; .5 MG/3ML; MG/3ML
3 SOLUTION RESPIRATORY (INHALATION) EVERY 6 HOURS PRN
Status: DISCONTINUED | OUTPATIENT
Start: 2024-05-10 | End: 2024-05-16

## 2024-05-10 RX ORDER — SODIUM CHLORIDE 9 MG/ML
INJECTION, SOLUTION INTRAVENOUS CONTINUOUS PRN
Status: DISCONTINUED | OUTPATIENT
Start: 2024-05-10 | End: 2024-05-10 | Stop reason: SURG

## 2024-05-10 RX ORDER — FENTANYL 25 UG/1
1 PATCH TRANSDERMAL
Status: DISCONTINUED | OUTPATIENT
Start: 2024-05-13 | End: 2024-05-16

## 2024-05-10 RX ORDER — ONDANSETRON 2 MG/ML
4 INJECTION INTRAMUSCULAR; INTRAVENOUS EVERY 6 HOURS PRN
Status: DISCONTINUED | OUTPATIENT
Start: 2024-05-10 | End: 2024-05-10 | Stop reason: HOSPADM

## 2024-05-10 RX ORDER — DIPHENHYDRAMINE HYDROCHLORIDE 50 MG/ML
12.5 INJECTION INTRAMUSCULAR; INTRAVENOUS EVERY 4 HOURS PRN
Status: DISCONTINUED | OUTPATIENT
Start: 2024-05-10 | End: 2024-05-12

## 2024-05-10 RX ORDER — ACETAMINOPHEN 500 MG
1000 TABLET ORAL ONCE
Status: DISCONTINUED | OUTPATIENT
Start: 2024-05-10 | End: 2024-05-10 | Stop reason: HOSPADM

## 2024-05-10 RX ORDER — AMLODIPINE BESYLATE 2.5 MG/1
5 TABLET ORAL EVERY MORNING
COMMUNITY

## 2024-05-10 RX ORDER — ENOXAPARIN SODIUM 100 MG/ML
40 INJECTION SUBCUTANEOUS DAILY
Status: DISCONTINUED | OUTPATIENT
Start: 2024-05-11 | End: 2024-05-16

## 2024-05-10 RX ORDER — SODIUM CHLORIDE, SODIUM LACTATE, POTASSIUM CHLORIDE, CALCIUM CHLORIDE 600; 310; 30; 20 MG/100ML; MG/100ML; MG/100ML; MG/100ML
INJECTION, SOLUTION INTRAVENOUS CONTINUOUS
Status: DISCONTINUED | OUTPATIENT
Start: 2024-05-10 | End: 2024-05-11

## 2024-05-10 RX ORDER — ROCURONIUM BROMIDE 10 MG/ML
INJECTION, SOLUTION INTRAVENOUS AS NEEDED
Status: DISCONTINUED | OUTPATIENT
Start: 2024-05-10 | End: 2024-05-10 | Stop reason: SURG

## 2024-05-10 RX ORDER — FENTANYL 12.5 UG/1
1 PATCH TRANSDERMAL
Status: DISCONTINUED | OUTPATIENT
Start: 2024-05-13 | End: 2024-05-16

## 2024-05-10 RX ORDER — SODIUM CHLORIDE, SODIUM LACTATE, POTASSIUM CHLORIDE, CALCIUM CHLORIDE 600; 310; 30; 20 MG/100ML; MG/100ML; MG/100ML; MG/100ML
INJECTION, SOLUTION INTRAVENOUS CONTINUOUS
Status: DISCONTINUED | OUTPATIENT
Start: 2024-05-10 | End: 2024-05-10 | Stop reason: HOSPADM

## 2024-05-10 RX ORDER — METOCLOPRAMIDE HYDROCHLORIDE 5 MG/ML
10 INJECTION INTRAMUSCULAR; INTRAVENOUS EVERY 6 HOURS PRN
Status: DISCONTINUED | OUTPATIENT
Start: 2024-05-10 | End: 2024-05-16

## 2024-05-10 RX ORDER — ONDANSETRON 2 MG/ML
INJECTION INTRAMUSCULAR; INTRAVENOUS AS NEEDED
Status: DISCONTINUED | OUTPATIENT
Start: 2024-05-10 | End: 2024-05-10 | Stop reason: SURG

## 2024-05-10 RX ORDER — LOSARTAN POTASSIUM 50 MG/1
50 TABLET ORAL DAILY
Status: DISCONTINUED | OUTPATIENT
Start: 2024-05-11 | End: 2024-05-16

## 2024-05-10 RX ORDER — HYDROMORPHONE HYDROCHLORIDE 1 MG/ML
0.6 INJECTION, SOLUTION INTRAMUSCULAR; INTRAVENOUS; SUBCUTANEOUS EVERY 5 MIN PRN
Status: DISCONTINUED | OUTPATIENT
Start: 2024-05-10 | End: 2024-05-10 | Stop reason: HOSPADM

## 2024-05-10 RX ORDER — LIDOCAINE HYDROCHLORIDE ANHYDROUS AND DEXTROSE MONOHYDRATE .8; 5 G/100ML; G/100ML
INJECTION, SOLUTION INTRAVENOUS CONTINUOUS PRN
Status: DISCONTINUED | OUTPATIENT
Start: 2024-05-10 | End: 2024-05-10 | Stop reason: SURG

## 2024-05-10 RX ORDER — HEPARIN SODIUM 5000 [USP'U]/ML
5000 INJECTION, SOLUTION INTRAVENOUS; SUBCUTANEOUS ONCE
Status: COMPLETED | OUTPATIENT
Start: 2024-05-10 | End: 2024-05-10

## 2024-05-10 RX ORDER — METHADONE HYDROCHLORIDE 10 MG/ML
10 CONCENTRATE ORAL ONCE
Status: DISCONTINUED | OUTPATIENT
Start: 2024-05-10 | End: 2024-05-10

## 2024-05-10 RX ADMIN — EPHEDRINE SULFATE 10 MG: 50 INJECTION INTRAVENOUS at 08:45:00

## 2024-05-10 RX ADMIN — LIDOCAINE HYDROCHLORIDE ANHYDROUS AND DEXTROSE MONOHYDRATE 1.5 MG/KG/HR: .8; 5 INJECTION, SOLUTION INTRAVENOUS at 08:15:00

## 2024-05-10 RX ADMIN — EPHEDRINE SULFATE 10 MG: 50 INJECTION INTRAVENOUS at 08:27:00

## 2024-05-10 RX ADMIN — SODIUM CHLORIDE, SODIUM LACTATE, POTASSIUM CHLORIDE, CALCIUM CHLORIDE: 600; 310; 30; 20 INJECTION, SOLUTION INTRAVENOUS at 08:13:00

## 2024-05-10 RX ADMIN — SODIUM CHLORIDE, SODIUM LACTATE, POTASSIUM CHLORIDE, CALCIUM CHLORIDE: 600; 310; 30; 20 INJECTION, SOLUTION INTRAVENOUS at 14:10:00

## 2024-05-10 RX ADMIN — ONDANSETRON 4 MG: 2 INJECTION INTRAMUSCULAR; INTRAVENOUS at 08:13:00

## 2024-05-10 RX ADMIN — ROCURONIUM BROMIDE 10 MG: 10 INJECTION, SOLUTION INTRAVENOUS at 07:54:00

## 2024-05-10 RX ADMIN — ROCURONIUM BROMIDE 20 MG: 10 INJECTION, SOLUTION INTRAVENOUS at 09:20:00

## 2024-05-10 RX ADMIN — MIDAZOLAM HYDROCHLORIDE 2 MG: 1 INJECTION INTRAMUSCULAR; INTRAVENOUS at 07:34:00

## 2024-05-10 RX ADMIN — DEXAMETHASONE SODIUM PHOSPHATE 4 MG: 4 MG/ML VIAL (ML) INJECTION at 08:13:00

## 2024-05-10 RX ADMIN — SODIUM CHLORIDE: 9 INJECTION, SOLUTION INTRAVENOUS at 14:10:00

## 2024-05-10 RX ADMIN — ROCURONIUM BROMIDE 10 MG: 10 INJECTION, SOLUTION INTRAVENOUS at 07:42:00

## 2024-05-10 RX ADMIN — CEFAZOLIN SODIUM/WATER 2 G: 2 G/20 ML SYRINGE (ML) INTRAVENOUS at 07:33:00

## 2024-05-10 RX ADMIN — LIDOCAINE HYDROCHLORIDE 25 MG: 10 INJECTION, SOLUTION EPIDURAL; INFILTRATION; INTRACAUDAL; PERINEURAL at 07:42:00

## 2024-05-10 RX ADMIN — SODIUM CHLORIDE: 9 INJECTION, SOLUTION INTRAVENOUS at 08:14:00

## 2024-05-10 RX ADMIN — CEFAZOLIN SODIUM/WATER 2 G: 2 G/20 ML SYRINGE (ML) INTRAVENOUS at 11:33:00

## 2024-05-10 RX ADMIN — EPHEDRINE SULFATE 10 MG: 50 INJECTION INTRAVENOUS at 12:26:00

## 2024-05-10 NOTE — ANESTHESIA PROCEDURE NOTES
Regional Block    Performed by: Nate Mathew MD  Authorized by: Nate Mathew MD      General Information and Staff    Start Time:   Anesthesiologist:  Nate Mathew MD  Performed by:  Anesthesiologist  Patient Location:  OR      Site Identification: real time ultrasound guided and image stored and retrievable    Block site/laterality marked before start: site marked  Reason for Block: at surgeon's request and post-op pain management    Preanesthetic Checklist: 2 patient identifers, IV checked, risks and benefits discussed, monitors and equipment checked, pre-op evaluation, timeout performed, anesthesia consent, sterile technique used, no prohibitive neurological deficits and no local skin infection at insertion site      Procedure Details    Patient Position:   Prep: ChloraPrep    Monitoring:  Cardiac monitor, continuous pulse ox and blood pressure cuff  Block Type:  TAP  Laterality:  Left  Injection Technique:  Single-shot    Needle    Needle Type:  Short-bevel and echogenic  Needle Length:  100 mm  Needle Localization:  Ultrasound guidance  Reason for Ultrasound Use: appropriate spread of the medication was noted in real time and no ultrasound evidence of intravascular and/or intraneural injection            Assessment    Injection Assessment:  Good spread noted, negative resistance, negative aspiration for heme, incremental injection and low pressure  Heart Rate Change: No    - Patient tolerated block procedure well without evidence of immediate block related complications.     Medications      Additional Comments    Medication:  Bupivacaine 0.25% 30mL L

## 2024-05-10 NOTE — PLAN OF CARE
Assumed care @1630,   A&Ox4, drowsy  3L O2.    NSR/ST ,   6/10 pain in abd.  PCA dilaudid and Ketamine gtt.   Will stay in bed today.    Clear liquids.  Not awake enough to eat.  Aquino with adequate urine output.  Abd midline Incision with old drainage.  C/D/I  IVF infusing ketamine, dilaudid PCA, LR @80ml/hr    Updated POC with patient and family.      Problem: PAIN - ADULT  Goal: Verbalizes/displays adequate comfort level or patient's stated pain goal  Description: INTERVENTIONS:  - Encourage pt to monitor pain and request assistance  - Assess pain using appropriate pain scale  - Administer analgesics based on type and severity of pain and evaluate response  - Implement non-pharmacological measures as appropriate and evaluate response  - Consider cultural and social influences on pain and pain management  - Manage/alleviate anxiety  - Utilize distraction and/or relaxation techniques  - Monitor for opioid side effects  - Notify MD/LIP if interventions unsuccessful or patient reports new pain  - Anticipate increased pain with activity and pre-medicate as appropriate  Outcome: Progressing     Problem: RISK FOR INFECTION - ADULT  Goal: Absence of fever/infection during anticipated neutropenic period  Description: INTERVENTIONS  - Monitor WBC  - Administer growth factors as ordered  - Implement neutropenic guidelines  Outcome: Progressing     Problem: CARDIOVASCULAR - ADULT  Goal: Absence of cardiac arrhythmias or at baseline  Description: INTERVENTIONS:  - Continuous cardiac monitoring, monitor vital signs, obtain 12 lead EKG if indicated  - Evaluate effectiveness of antiarrhythmic and heart rate control medications as ordered  - Initiate emergency measures for life threatening arrhythmias  - Monitor electrolytes and administer replacement therapy as ordered  Outcome: Progressing     Problem: GASTROINTESTINAL - ADULT  Goal: Minimal or absence of nausea and vomiting  Description: INTERVENTIONS:  - Maintain adequate  hydration with IV or PO as ordered and tolerated  - Nasogastric tube to low intermittent suction as ordered  - Evaluate effectiveness of ordered antiemetic medications  - Provide nonpharmacologic comfort measures as appropriate  - Advance diet as tolerated, if ordered  - Obtain nutritional consult as needed  - Evaluate fluid balance  Outcome: Progressing  Goal: Maintains or returns to baseline bowel function  Description: INTERVENTIONS:  - Assess bowel function  - Maintain adequate hydration with IV or PO as ordered and tolerated  - Evaluate effectiveness of GI medications  - Encourage mobilization and activity  - Obtain nutritional consult as needed  - Establish a toileting routine/schedule  - Consider collaborating with pharmacy to review patient's medication profile  Outcome: Progressing  Goal: Maintains adequate nutritional intake (undernourished)  Description: INTERVENTIONS:  - Monitor percentage of each meal consumed  - Identify factors contributing to decreased intake, treat as appropriate  - Assist with meals as needed  - Monitor I&O, WT and lab values  - Obtain nutritional consult as needed  - Optimize oral hygiene and moisture  - Encourage food from home; allow for food preferences  - Enhance eating environment  Outcome: Progressing  Goal: Achieves appropriate nutritional intake (bariatric)  Description: INTERVENTIONS:  - Monitor for over-consumption  - Identify factors contributing to increased intake, treat as appropriate  - Monitor I&O, WT and lab values  - Obtain nutritional consult as needed  - Evaluate psychosocial factors contributing to over-consumption  Outcome: Progressing     Problem: GENITOURINARY - ADULT  Goal: Absence of urinary retention  Description: INTERVENTIONS:  - Assess patient’s ability to void and empty bladder  - Monitor intake/output and perform bladder scan as needed  - Follow urinary retention protocol/standard of care  - Consider collaborating with pharmacy to review patient's  medication profile  - Implement strategies to promote bladder emptying  Outcome: Progressing     Problem: METABOLIC/FLUID AND ELECTROLYTES - ADULT  Goal: Electrolytes maintained within normal limits  Description: INTERVENTIONS:  - Monitor labs and rhythm and assess patient for signs and symptoms of electrolyte imbalances  - Administer electrolyte replacement as ordered  - Monitor response to electrolyte replacements, including rhythm and repeat lab results as appropriate  - Fluid restriction as ordered  - Instruct patient on fluid and nutrition restrictions as appropriate  Outcome: Progressing     Problem: SKIN/TISSUE INTEGRITY - ADULT  Goal: Incision(s), wounds(s) or drain site(s) healing without S/S of infection  Description: INTERVENTIONS:  - Assess and document risk factors for pressure ulcer development  - Assess and document skin integrity  - Assess and document dressing/incision, wound bed, drain sites and surrounding tissue  - Implement wound care per orders  - Initiate isolation precautions as appropriate  - Initiate Pressure Ulcer prevention bundle as indicated  Outcome: Progressing

## 2024-05-10 NOTE — CONSULTS
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 Assessed at bedside she feels the needs to urinate ( dennis  in place) and states pain 8/10- has PCA which she has used x1.     History/Other:    Past Medical History:  Past Medical History:    Cancer (HCC)    Disorder of thyroid    Exposure to medical diagnostic radiation    last tx 2024    High blood pressure    Hx of motion sickness    Visual impairment    glasses     Past Surgical History:   Past Surgical History:   Procedure Laterality Date    Cholecystectomy      Hysterectomy      Mastectomy left      Other surgical history      left hemilaminectomy L3-L4      Family History:   Family History   Problem Relation Age of Onset    Breast Cancer Daughter     Other (Colorectal Cancer) Son     Breast Cancer Maternal Aunt     Breast Cancer Maternal Aunt     Breast Cancer Maternal Aunt      Social History:    reports that she quit smoking about 19 years ago. Her smoking use included cigarettes. She has never used smokeless tobacco. She reports that she does not currently use alcohol. She reports that she does not currently use drugs after having used the following drugs: Cannabis.     Allergies:   Allergies   Allergen Reactions    Naproxen OTHER (SEE COMMENTS)    Omeprazole HIVES and ITCHING    Ondansetron OTHER (SEE COMMENTS)     Patient states she can take without difficulty      Radiology Contrast Iodinated Dyes ITCHING     DYE from CT Scan causes itching per Patient.    CT contrast, per patient CT  was stopped and needed Benadryl during procedure. C/O itching   DYE from CT Scan causes itching per Patient.    Iodine ITCHING       Medications:    Current Facility-Administered Medications on File Prior to Encounter   Medication Dose Route Frequency Provider Last Rate Last Admin    [COMPLETED] iopamidol 76% (ISOVUE-370) injection for power injector  80 mL Intravenous ONCE PRN Kathryn Henderson PA   80 mL at 04/01/24 1404    [COMPLETED] gadoterate meglumine (Dotarem) 10 MMOL/20ML injection 20 mL  20 mL Intravenous ONCE PRN Kathryn Henderson PA   20 mL at 03/28/24 1522    [COMPLETED] gadoterate meglumine (Dotarem) 10 MMOL/20ML injection 20 mL  20 mL Intravenous ONCE PRN Kathryn Henderson PA   20 mL at 03/27/24 1631     Current Outpatient Medications on File Prior to Encounter   Medication Sig Dispense Refill    amLODIPine 2.5 MG Oral Tab Take 2 tablets (5 mg total) by mouth every morning.      amLODIPine 2.5 MG Oral Tab Take 1 tablet (2.5 mg total) by mouth at bedtime.      acetaminophen 325 MG Oral Tab Take 2 tablets (650 mg total) by mouth every 6 (six) hours as needed.      DULoxetine 60 MG Oral Cap DR Particles TAKE 1 CAPSULE BY MOUTH DAILY FOR CHRONIC MUSCLE OR BONE PAIN OR NERVE PAIN OR RECURRENT ANXIETY      gabapentin 300 MG Oral Cap Take 1 capsule (300 mg total) by mouth 3 (three) times daily.      hydrOXYzine 25 MG Oral Tab Take 1 tablet (25 mg total) by mouth 2 (two) times daily as needed for Anxiety.      levothyroxine 175 MCG Oral Tab Take 1 tablet (175 mcg total) by mouth before breakfast.      telmisartan 80 MG Oral Tab Take 0.5-1 tablets (40-80 mg total) by mouth daily.      Budesonide-Formoterol Fumarate (SYMBICORT) 160-4.5 MCG/ACT Inhalation Aerosol Inhale 2 puffs into the lungs 2 (two) times daily.      HYDROmorphone 4 MG Oral Tab Take 1 tablet (4 mg total) by mouth every 4 (four) hours as needed for Pain.      fentaNYL 12 MCG/HR Transdermal Patch 72 Hr Place 1 patch onto the skin every 3  (three) days.      fentaNYL 25 MCG/HR Transdermal Patch 72 Hr Place 1 patch onto the skin every third day.      ipratropium-albuterol 0.5-2.5 (3) MG/3ML Inhalation Solution Inhale 3 mL into the lungs every 6 (six) hours as needed.      Naloxone HCl 4 MG/0.1ML Nasal Liquid SPRAY 1 SPRAY IN EACH NOSTRIL FOR OVERDOSE. MAY REPEAT EVERY 2-3 MINUTES IN ALTERNATING NOSTRILS UNTIL MEDICAL ASSISTANCE BECOMES AVAILABLE      ondansetron 4 MG Oral Tablet Dispersible DISSOLVE 1 TABLET ON THE TONGUE THREE TIMES DAILY FOR UP TO 10 DAYS AS NEEDED FOR NAUSEA OR VOMITING         Review of Systems:   A comprehensive review of systems was completed.    Pertinent positives and negatives noted in the HPI.    Objective:   Physical Exam:    BP (!) 172/104   Pulse 100   Temp 98.4 °F (36.9 °C) (Temporal)   Resp 20   Ht 5' 2\" (1.575 m)   Wt 190 lb (86.2 kg)   SpO2 98%   BMI 34.75 kg/m²   General: No acute distress, Alert  Respiratory: No rhonchi, no wheezes  Cardiovascular: S1, S2. Regular rate and rhythm  Abdomen: Soft, NT/ND, +BS  Neuro: No new focal deficits  Extremities: No edema      Results:    Labs:      Labs Last 24 Hours:  No results for input(s): \"WBC\", \"HGB\", \"MCV\", \"PLT\", \"BAND\", \"INR\" in the last 168 hours.    Invalid input(s): \"LYM#\", \"MONO#\", \"BASOS#\", \"EOSIN#\"    No results for input(s): \"GLU\", \"BUN\", \"CREATSERUM\", \"GFRAA\", \"GFRNAA\", \"CA\", \"ALB\", \"NA\", \"K\", \"CL\", \"CO2\", \"ALKPHO\", \"AST\", \"ALT\", \"BILT\", \"TP\" in the last 168 hours.    No results for input(s): \"PTP\", \"INR\" in the last 168 hours.    No results for input(s): \"TROP\", \"CK\" in the last 168 hours.      Imaging: Imaging data reviewed in Epic.    Assessment & Plan:      #POD D0 s/p left pelvis sarcoma resection with resection of Left internal iliac artery and vein  Pt on chronic pain meds- Dilaudid 4 PRN, Fentanyl patch 72 mcg  Continue fentanyl patch pt states last changed to day morning   PCA Pump, Ketamine infusion   Anti emetics  #Hypertension  PRN  Hydralazine  Norvasc BID as taken at home  Hold ARB/hydrochlorothiazide ending AM labs           Plan of care discussed with pt, pt family, RN    Aileen Blackman MD  5/10/2024    The 21st Century Cures Act makes medical notes like these available to patients in the interest of transparency. Please be advised this is a medical document. Medical documents are intended to carry relevant information, facts as evident, and the clinical opinion of the practitioner. The medical note is intended as peer to peer communication and may appear blunt or direct. It is written in medical language and may contain abbreviations or verbiage that are unfamiliar.

## 2024-05-10 NOTE — INTERVAL H&P NOTE
There has been no significant change since Dr Mary saw patient as documented in [facility].   Surgery revisted.   To proceed as planned.      JAIMIE Fry

## 2024-05-10 NOTE — ANESTHESIA PREPROCEDURE EVALUATION
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 (Left: Spine Lumbar)  INTRAOPERATIVE NEURO MONITORING (Left)    Surgeons and Role:  Panel 1:     * Christian Mary MD - Primary  Panel 2:     * MIGUEL Jackson DO - Primary    Pre-op vitals reviewed.  Temp: 97.9 °F (36.6 °C)  Pulse: 73  Resp: 16  BP: 118/80  SpO2: 98 %  Body mass index is 34.75 kg/m².    Current medications reviewed.  Hospital Medications:   acetaminophen (Tylenol Extra Strength) tab 1,000 mg  1,000 mg Oral Once    lactated ringers infusion   Intravenous Continuous    [COMPLETED] heparin (Porcine) 5000 UNIT/ML injection 5,000 Units  5,000 Units Subcutaneous Once    ceFAZolin (Ancef) 2 g in 20mL IV syringe premix  2 g Intravenous Once    sodium chloride 0.9% infusion   Intravenous Continuous    ketamine (Ketalar) 250 mg in sodium chloride 0.9% 250 mL infusion for pain  0.2 mg/kg/hr (Dosing Weight) Intravenous Continuous       Outpatient Medications:     Medications Prior to Admission   Medication Sig Dispense Refill Last Dose    amLODIPine 2.5 MG Oral Tab Take 1 tablet (2.5 mg total) by mouth 2 (two) times daily.   5/9/2024 at 2000    acetaminophen 325 MG Oral Tab Take 2 tablets (650 mg total) by mouth every 6 (six) hours as needed.   Past Week    DULoxetine 60 MG Oral Cap DR Particles TAKE 1 CAPSULE BY MOUTH DAILY FOR CHRONIC MUSCLE OR BONE PAIN OR NERVE PAIN OR RECURRENT ANXIETY   5/9/2024    gabapentin 300 MG Oral Cap Take 1 capsule (300 mg total) by mouth 3 (three) times daily.   5/9/2024    hydrOXYzine 25 MG Oral Tab Take 1 tablet (25 mg total) by mouth 2 (two) times daily as needed for Anxiety.   5/9/2024    levothyroxine 175 MCG Oral Tab Take 1 tablet (175 mcg total) by mouth before breakfast.   5/9/2024     telmisartan 80 MG Oral Tab Take 0.5-1 tablets (40-80 mg total) by mouth daily.   5/9/2024    Budesonide-Formoterol Fumarate (SYMBICORT) 160-4.5 MCG/ACT Inhalation Aerosol Inhale 2 puffs into the lungs 2 (two) times daily.   5/9/2024    HYDROmorphone 4 MG Oral Tab Take 1 tablet (4 mg total) by mouth every 4 (four) hours as needed for Pain.   5/9/2024 at 1500    fentaNYL 12 MCG/HR Transdermal Patch 72 Hr Place 1 patch onto the skin every 3 (three) days.   5/9/2024 at ON    fentaNYL 25 MCG/HR Transdermal Patch 72 Hr APPLY 1 PATCH TOPICALLY TO THE SKIN EVERY 72 HOURS FOR 6 DAYS   5/9/2024 at ON    ipratropium-albuterol 0.5-2.5 (3) MG/3ML Inhalation Solution Inhale 3 mL into the lungs every 6 (six) hours as needed.   More than a month    Naloxone HCl 4 MG/0.1ML Nasal Liquid SPRAY 1 SPRAY IN EACH NOSTRIL FOR OVERDOSE. MAY REPEAT EVERY 2-3 MINUTES IN ALTERNATING NOSTRILS UNTIL MEDICAL ASSISTANCE BECOMES AVAILABLE       ondansetron 4 MG Oral Tablet Dispersible DISSOLVE 1 TABLET ON THE TONGUE THREE TIMES DAILY FOR UP TO 10 DAYS AS NEEDED FOR NAUSEA OR VOMITING   More than a month       Allergies: Naproxen, Omeprazole, Ondansetron, Radiology contrast iodinated dyes, and Iodine      Anesthesia Evaluation    Patient summary reviewed.    Anesthetic Complications           GI/Hepatic/Renal                                 Cardiovascular                (+) obesity  (+) hypertension                                     Endo/Other    Negative endo/other ROS.                              Pulmonary        (+) COPD                   Neuro/Psych                 (+) neuromuscular disease                     Past Surgical History:   Procedure Laterality Date    Cholecystectomy      Hysterectomy      Mastectomy left      Other surgical history      left hemilaminectomy L3-L4     Social History     Socioeconomic History    Marital status:    Tobacco Use    Smoking status: Former     Current packs/day: 0.00     Types: Cigarettes      Quit date:      Years since quittin.3    Smokeless tobacco: Never   Vaping Use    Vaping status: Never Used   Substance and Sexual Activity    Alcohol use: Not Currently    Drug use: Not Currently     Types: Cannabis     History   Drug Use Unknown     Available pre-op labs reviewed.  Lab Results   Component Value Date    WBC 8.6 04/15/2024    RBC 4.86 04/15/2024    HGB 14.6 04/15/2024    HCT 46.0 04/15/2024    MCV 94.7 04/15/2024    MCH 30.0 04/15/2024    MCHC 31.7 04/15/2024    RDW 13.3 04/15/2024    .0 04/15/2024     Lab Results   Component Value Date     04/15/2024    K 4.1 04/15/2024     04/15/2024    CO2 25.0 04/15/2024    BUN 7 (L) 04/15/2024    CREATSERUM 0.74 04/15/2024     (H) 04/15/2024    CA 10.5 (H) 04/15/2024            Airway      Mallampati: II       Cardiovascular    Cardiovascular exam normal.         Dental    Dentition appears grossly intact         Pulmonary    Pulmonary exam normal.                 Other findings              ASA: 3   Plan: general and regional  NPO status verified and           Plan/risks discussed with: patient                Present on Admission:  **None**

## 2024-05-10 NOTE — BRIEF OP NOTE
Pre-Operative Diagnosis: Sarcoma of pelvis     Post-Operative Diagnosis: Sarcoma of pelvis     Procedure Performed:   Radical resection sarcoma left pelvis with resection of left internal iliac artery and vein. Exclusion of small bowel with omentum. Use of Plasma Jet to potentiate posterior margin.    Surgeons and Role:  Panel 1:     * Christian Mary MD - Primary  Panel 2:     * MIGUEL Jackson DO - Primary    Assistant(s):  Surgical Assistant.: Vale Mireles, [facility]; Lamin Reddy, [facility]  PA: Kathryn Henderson PA     Surgical Findings: Gross resection     Specimen: Yes     Estimated Blood Loss: Blood Output: 40 mL (5/10/2024  1:44 PM)      Christian Mary MD  5/10/2024  2:02 PM

## 2024-05-10 NOTE — ANESTHESIA PROCEDURE NOTES
Airway  Date/Time: 5/10/2024 7:45 AM  Urgency: elective      General Information and Staff    Patient location during procedure: OR  Anesthesiologist: Nate Mathew MD  Performed: anesthesiologist   Performed by: Nate Mathew MD  Authorized by: Nate Mathew MD      Indications and Patient Condition  Indications for airway management: anesthesia  Sedation level: deep  Preoxygenated: yes  Patient position: sniffing  Mask difficulty assessment: 1 - vent by mask    Final Airway Details  Final airway type: endotracheal airway      Successful airway: ETT  Cuffed: yes   Successful intubation technique: direct laryngoscopy  Endotracheal tube insertion site: oral  Blade: Po  Blade size: #4  ETT size (mm): 7.0    Placement verified by: capnometry   Measured from: lips  Number of attempts at approach: 1

## 2024-05-10 NOTE — ANESTHESIA POSTPROCEDURE EVALUATION
[facility]    Citlali Leung Patient Status:  Inpatient   Age/Gender 63 year old female MRN [id number]   Location [facility] SURGERY Attending Christian Mary MD   Hosp Day # 0 PCP Janel Bearhekwkaiser       Anesthesia Post-op Note    Radical resection sarcoma left pelvis with resection of left internal iliac artery and vein. Exclusion of small bowel with omentum. Use of Plasma Jet to potentiate posterior margin.    Procedure Summary       Date: 05/10/24 Room / Location:  MAIN OR 10 / [facility] MAIN OR    Anesthesia Start: 0733 Anesthesia Stop:     Procedures:       Radical resection sarcoma left pelvis with resection of left internal iliac artery and vein. Exclusion of small bowel with omentum. Use of Plasma Jet to potentiate posterior margin. (Left: Abdomen)      . 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])    Surgeons: Christian Mary MD; MIGUEL Jackson DO Anesthesiologist: Nate Mathew MD    Anesthesia Type: general ASA Status: 3            Anesthesia Type: general    Vitals Value Taken Time   /86 05/10/24 1411   Temp 99.5 05/10/24 1411   Pulse 103 05/10/24 1411   Resp 17 05/10/24 1411   SpO2 95 05/10/24 1411       Patient Location: PACU    Anesthesia Type: general    Airway Patency: patent    Postop Pain Control: adequate    Mental Status: mildly sedated but able to meaningfully participate in the post-anesthesia evaluation    Nausea/Vomiting: none    Cardiopulmonary/Hydration status: stable euvolemic    Complications: no apparent anesthesia related complications    Postop vital signs: stable    Dental Exam: Unchanged from Preop    Patient to be discharged from PACU when criteria met.

## 2024-05-10 NOTE — ANESTHESIA PROCEDURE NOTES
Arterial Line    Performed by: Nate Mathew MD  Authorized by: Nate Mathew MD    General Information and Staff    Procedure Start:   Anesthesiologist:  Nate Mathew MD  Performed By:  Anesthesiologist  Patient Location:  OR  Indication: continuous blood pressure monitoring and blood sampling needed    Site Identification: surface landmarks    Preanesthetic Checklist: 2 patient identifiers, IV checked, risks and benefits discussed, monitors and equipment checked, pre-op evaluation, timeout performed, anesthesia consent and sterile technique used    Procedure Details    Catheter Size:  20 G  Catheter Length:  1 and 3/4 inch  Catheter Type:  Arrow  Seldinger Technique?: Yes    Laterality:  Right  Site:  Radial artery  Site Prep: chlorhexidine    Line Secured:  Wrist Brace, tape and Tegaderm    Assessment    Events: patient tolerated procedure well with no complications      Medications      Additional Comments

## 2024-05-11 LAB
ALBUMIN SERPL-MCNC: 2.8 G/DL (ref 3.4–5)
ALBUMIN/GLOB SERPL: 0.8 {RATIO} (ref 1–2)
ALP LIVER SERPL-CCNC: 72 U/L
ALT SERPL-CCNC: 24 U/L
ANION GAP SERPL CALC-SCNC: 5 MMOL/L (ref 0–18)
AST SERPL-CCNC: 45 U/L (ref 15–37)
BILIRUB SERPL-MCNC: 0.5 MG/DL (ref 0.1–2)
BUN BLD-MCNC: 5 MG/DL (ref 9–23)
CALCIUM BLD-MCNC: 9.2 MG/DL (ref 8.5–10.1)
CHLORIDE SERPL-SCNC: 111 MMOL/L (ref 98–112)
CO2 SERPL-SCNC: 24 MMOL/L (ref 21–32)
CREAT BLD-MCNC: 0.63 MG/DL
EGFRCR SERPLBLD CKD-EPI 2021: 101 ML/MIN/1.73M2 (ref 60–?)
ERYTHROCYTE [DISTWIDTH] IN BLOOD BY AUTOMATED COUNT: 12.4 %
GLOBULIN PLAS-MCNC: 3.4 G/DL (ref 2.8–4.4)
GLUCOSE BLD-MCNC: 140 MG/DL (ref 70–99)
GLUCOSE BLD-MCNC: 144 MG/DL (ref 70–99)
HCT VFR BLD AUTO: 37.3 %
HGB BLD-MCNC: 12.8 G/DL
MAGNESIUM SERPL-MCNC: 1.9 MG/DL (ref 1.6–2.6)
MCH RBC QN AUTO: 30.5 PG (ref 26–34)
MCHC RBC AUTO-ENTMCNC: 34.3 G/DL (ref 31–37)
MCV RBC AUTO: 89 FL
OSMOLALITY SERPL CALC.SUM OF ELEC: 290 MOSM/KG (ref 275–295)
PHOSPHATE SERPL-MCNC: 2.6 MG/DL (ref 2.5–4.9)
PLATELET # BLD AUTO: 280 10(3)UL (ref 150–450)
POTASSIUM SERPL-SCNC: 3.3 MMOL/L (ref 3.5–5.1)
POTASSIUM SERPL-SCNC: 3.5 MMOL/L (ref 3.5–5.1)
PROT SERPL-MCNC: 6.2 G/DL (ref 6.4–8.2)
RBC # BLD AUTO: 4.19 X10(6)UL
SODIUM SERPL-SCNC: 140 MMOL/L (ref 136–145)
WBC # BLD AUTO: 16 X10(3) UL (ref 4–11)

## 2024-05-11 PROCEDURE — 99232 SBSQ HOSP IP/OBS MODERATE 35: CPT | Performed by: HOSPITALIST

## 2024-05-11 RX ORDER — PROCHLORPERAZINE EDISYLATE 5 MG/ML
5 INJECTION INTRAMUSCULAR; INTRAVENOUS EVERY 4 HOURS PRN
Status: DISCONTINUED | OUTPATIENT
Start: 2024-05-11 | End: 2024-05-16

## 2024-05-11 RX ORDER — POTASSIUM CHLORIDE 20 MEQ/1
40 TABLET, EXTENDED RELEASE ORAL EVERY 4 HOURS
Status: DISPENSED | OUTPATIENT
Start: 2024-05-11 | End: 2024-05-12

## 2024-05-11 RX ORDER — POTASSIUM CHLORIDE 20 MEQ/1
40 TABLET, EXTENDED RELEASE ORAL EVERY 4 HOURS
Status: COMPLETED | OUTPATIENT
Start: 2024-05-11 | End: 2024-05-11

## 2024-05-11 RX ORDER — LABETALOL HYDROCHLORIDE 5 MG/ML
5 INJECTION, SOLUTION INTRAVENOUS EVERY 4 HOURS PRN
Status: DISCONTINUED | OUTPATIENT
Start: 2024-05-11 | End: 2024-05-16

## 2024-05-11 NOTE — PROGRESS NOTES
[facility]   part of [facility]     Hospitalist Progress Note     Citlali Leung Patient Status:  Inpatient    1960 MRN [id number]   Location [facility] 7NE-A Attending Christian Mary MD   Hosp Day # 1 PCP Janel Hendrickson     Chief Complaint: Abdominal pain    Subjective:     Patient having some nausea today, no vomiting.  +abdominal pain, controlled on current pain plan.  Denies fever, chills, cp, sob.  No other acute complaints.      Objective:    Review of Systems:   A comprehensive review of systems was completed; pertinent positive and negatives stated in subjective.    Vital signs:  Temp:  [97.1 °F (36.2 °C)-98.9 °F (37.2 °C)] 97.5 °F (36.4 °C)  Pulse:  [100-106] 103  Resp:  [12-20] 17  BP: (120-193)/() 181/96  SpO2:  [92 %-98 %] 96 %  AO: (120-142)/() 142/78    Physical Exam:    General: No acute distress, pleasant   Respiratory: No wheezes, no rhonchi  Cardiovascular: S1, S2, regular rate and rhythm  Abdomen: Soft, mild tenderness, non-distended, incision c/d/i  Neuro: No new focal deficits.   Extremities: No edema    Diagnostic Data:    Labs:  Recent Labs   Lab 24  0649   WBC 16.0*   HGB 12.8   MCV 89.0   .0       Recent Labs   Lab 24  0649   *   BUN 5*   CREATSERUM 0.63   CA 9.2   ALB 2.8*      K 3.3*      CO2 24.0   ALKPHO 72   AST 45*   ALT 24   BILT 0.5   TP 6.2*       Estimated Creatinine Clearance: 74.2 mL/min (based on SCr of 0.63 mg/dL).    No results for input(s): \"TROP\", \"TROPHS\", \"CK\" in the last 168 hours.    No results for input(s): \"PTP\", \"INR\" in the last 168 hours.               Microbiology    No results found for this visit on 05/10/24.      Imaging: Reviewed in Epic.    Medications:    enoxaparin  40 mg Subcutaneous Daily    acetaminophen  1,000 mg Intravenous Q6H    famotidine  20 mg Oral BID    Or    famotidine  20 mg Intravenous BID    fluticasone furoate-vilanterol  1 puff Inhalation Daily    DULoxetine  60 mg  Oral Daily    gabapentin  300 mg Oral TID    levothyroxine  175 mcg Oral Before breakfast    amLODIPine  5 mg Oral Daily    amLODIPine  2.5 mg Oral Nightly    [Held by provider] losartan  50 mg Oral Daily    [START ON 5/13/2024] fentaNYL  1 patch Transdermal Q72H    [START ON 5/13/2024] fentaNYL  1 patch Transdermal Q72H       Assessment & Plan:      #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  #Leiomyosarcoma   Surgery on 5/10  Pain control with Dilaudid PCA, Ketamine   Anti-emetics  Full liquid diet   Lovenox   Post op care per surgery    #Essential HTN - amlodipine, losartan   #Hypothyroidism - Synthroid   #VANDA - Duloxetine   #Peripheral neuropathy - Gabapentin   #Chronic bronchitis - Breo, prn albuterol   #Hypokalemia - replete and trend     #Leukocytosis - post op reaction, afebrile, no cough or dysuria, monitor off abx at this time, if fever spike will initiate abx and get cultures      Terrance Velazquez MD    Supplementary Documentation:     Quality:  DVT Mechanical Prophylaxis:   SCDs, Early ambuation  DVT Pharmacologic Prophylaxis   Medication    enoxaparin (Lovenox) 40 MG/0.4ML SUBQ injection 40 mg                Code Status: Not on file  Aquino: Aquino catheter in place  Aquino Duration (in days): 2  Central line:    DAV:     Discharge is dependent on: surgical recovery   At this point Ms. Leung is expected to be discharge to: Home    The 21st Century Cures Act makes medical notes like these available to patients in the interest of transparency. Please be advised this is a medical document. Medical documents are intended to carry relevant information, facts as evident, and the clinical opinion of the practitioner. The medical note is intended as peer to peer communication and may appear blunt or direct. It is written in medical language and may contain abbreviations or verbiage that are unfamiliar.

## 2024-05-11 NOTE — PROGRESS NOTES
[facility]  Progress Note    Citlali Leung Patient Status:  Inpatient    1960 MRN [id number]   [facility] 7NE-A Attending Christian Mary MD   Hosp Day # 1 PCP Janel Hendrickson     Subjective:  The patient is resting comfortably in bed.  She reports mild intermittent nausea.  She denies vomiting.  She denies a bowel movement.  She denies flatus.  She denies fever or chills.    Objective/Physical Exam:  General: Alert, orientated x3.  Cooperative.  No apparent distress.  Vital Signs:  Blood pressure (!) 181/96, pulse 103, temperature 97.5 °F (36.4 °C), temperature source Temporal, resp. rate 17, height 62\", weight 190 lb (86.2 kg), SpO2 96%.  Wt Readings from Last 3 Encounters:   05/10/24 190 lb (86.2 kg)   04/10/24 189 lb (85.7 kg)   24 204 lb 3.2 oz (92.6 kg)     Lungs: No respiratory distress.  Cardiac: Regular rate and rhythm.   Abdomen:  Soft, mildly distended, incisional tenderness, with no rebound or guarding.  No peritoneal signs.   Extremities:  No lower extremity edema noted.    Incision: Clean, dry, intact, no erythema      Intake/Output:    Intake/Output Summary (Last 24 hours) at 2024 0757  Last data filed at 2024 0746  Gross per 24 hour   Intake 5553.6 ml   Output 2440 ml   Net 3113.6 ml     I/O last 3 completed shifts:  In: 5467.6 [I.V.:5227.6; IV PIGGYBACK:240]  Out: 2440 [Urine:2400; Blood:40]  I/O this shift:  In: 106 [I.V.:106]  Out: -     Medications:    enoxaparin  40 mg Subcutaneous Daily    acetaminophen  1,000 mg Intravenous Q6H    famotidine  20 mg Oral BID    Or    famotidine  20 mg Intravenous BID    fluticasone furoate-vilanterol  1 puff Inhalation Daily    DULoxetine  60 mg Oral Daily    gabapentin  300 mg Oral TID    levothyroxine  175 mcg Oral Before breakfast    amLODIPine  5 mg Oral Daily    amLODIPine  2.5 mg Oral Nightly    [Held by provider] losartan  50 mg Oral Daily    [START ON 2024] fentaNYL  1 patch Transdermal Q72H    [START  ON 5/13/2024] fentaNYL  1 patch Transdermal Q72H       Labs:  Lab Results   Component Value Date    WBC 16.0 05/11/2024    HGB 12.8 05/11/2024    HCT 37.3 05/11/2024    .0 05/11/2024     Lab Results   Component Value Date     05/11/2024    K 3.3 05/11/2024     05/11/2024    CO2 24.0 05/11/2024    BUN 5 05/11/2024    CREATSERUM 0.63 05/11/2024     05/11/2024    CA 9.2 05/11/2024    ALKPHO 72 05/11/2024    ALT 24 05/11/2024    AST 45 05/11/2024    BILT 0.5 05/11/2024    ALB 2.8 05/11/2024    TP 6.2 05/11/2024     No results found for: \"PT\", \"INR\"      Assessment  Patient Active Problem List   Diagnosis    Acute sinusitis, recurrence not specified, unspecified location    Arthritis    Bronchitis    Blepharitis of right lower eyelid    Breast pain, right    Other chronic pain    Chronic midline low back pain with left-sided sciatica    Cervical radiculopathy    Hypertension due to endocrine disorder    Hypokalemia    Insomnia    Left lumbar radiculopathy    Sarcoma of pelvic peritoneum (HCC)    Fibromyalgia    History of left breast cancer    Hyperlipidemia, unspecified hyperlipidemia type    Hypothyroidism, unspecified type    VANDA (generalized anxiety disorder)    Age-related osteoporosis without current pathological fracture    Abnormal EKG    COPD with acute exacerbation (HCC)    Diarrhea of presumed infectious origin    Disorder of thyroid    Generalized weakness    Hordeolum externum of right lower eyelid    Lumbar facet arthropathy    Osteopenia    Primary hyperparathyroidism (HCC)    Hypertension    Productive cough    Simple chronic bronchitis (HCC)    Vitamin D deficiency     Postop day 1 radical resection, left pelvic/retroperitoneal sarcoma with resection of internal iliac artery and vein      Plan:  Start full liquid diet.  Decrease IV fluids.  Remove Aquino today when more mobile.  Continue pain control with Dilaudid PCA, Duragesic patch, IV Tylenol and ketamine.  Transition to oral  medications when tolerating oral medications.  Ambulate and up to chair  Abdominal binder as needed  DVT prophylaxis with Lovenox  GI prophylaxis with Pepcid    Quality:  DVT Mechanical Prophylaxis:   SCDs, Early ambuation  DVT Pharmacologic Prophylaxis   Medication    enoxaparin (Lovenox) 40 MG/0.4ML SUBQ injection 40 mg                Code Status: Not on file  Aquino: Aquino catheter in place  Aquino Duration (in days): 2  Central line:    DAV:         Yesi Thapa PA-C  5/11/2024  7:57 AM    Afebrile-mild tachycardia from 100-1 05.  Elevated blood pressure earlier 193/114.  Treated and currently /78  Serology reveals hypokalemia of 3.3, albumin diminished 2.8, leukocytosis 16-likely reactive  Patient reports that she is feeling somewhat better.  She is tolerating clear liquids.  She has not had flatus or bowel movement however she reports feeling less distended.  Will transition to full liquid diet.  Patient was advised to go slowly.  Patient encouraged to ambulate.  She is wearing her abdominal binder.  On physical examination she is slightly distended.  The incision is clean and dry.  Transition to oral pain medication when tolerating p.o. Aquino.  Replace electrolytes per protocol.  Repeat in AM.  I agree with the note above and attest to its accuracy with the following changes below after my interview and examination of the patient    The patient was seen and examined.  Available labs and radiology is noted.    Kavita White MD FACS    Please note that this report has been produced using speech recognition software and may contain errors related to that system including but not limited to errors in grammar, punctuation and spelling as well as words and phrases that possibly may have been recognized inappropriately.  If there are any questions or concerns please contact the dictating provider for clarification.    The 21st Century Cures Act makes medical notes like these available to patients in the  interest of trans parency. Please be advised this is a medical document. Medical documents are intended to carry relevant information, facts as evident, and the clinical opinion of the practitioner. The medical note is intended as peer to peer communication and may appear blunt or direct. It is written in medical language and may contain abbreviations or verbiage that are unfamiliar.   Again if there are any questions or concerns please contact the dictating provider for clarification.

## 2024-05-11 NOTE — PLAN OF CARE
Pt complaining of nausea overnight, compazine given along with Reglan  Bp elevated, sinus tach to NS on tele.   Pt has limb restrictions on both arm due to extended and midline on right arm and previous mastectomy on left side, bp taken on leg  Bp elevated and labetalol and hydralazine given interchangeably per Dr. Galindo  BP got down to   PCA Dilaudid running and tylenol IV, and ketamine   Patient slightly drowsy but easily aroused to sound   Aquino draining yellow/clear urine, urine goal met  IS at bedside  Midline incision with serosanguinous drainage. Marked on dressing to measure shadowing  Larger Binder needed for patient, calling down to order from CD.   Bowel sounds present but hypoactive  Abdomen soft, no gas yet    Call light within reach, all safety measures maintained. Personal items within reach      Goal: Keep bp within range, pain management, passing gas and BM

## 2024-05-11 NOTE — OPERATIVE REPORT
[facility]    PATIENT'S NAME: MEGGAN MISHRA   ATTENDING PHYSICIAN: Christian Mary MD   OPERATING PHYSICIAN: Christian Mary MD   PATIENT ACCOUNT#:   [id number]    LOCATION:  [address] [place] [address]  MEDICAL RECORD #:   [id number]       YOB: 1960  ADMISSION DATE:       05/10/2024      OPERATION DATE:  05/10/2024    OPERATIVE REPORT      PREOPERATIVE DIAGNOSIS:  Left pelvic/retroperitoneal sarcoma.    POSTOPERATIVE DIAGNOSIS:  Left pelvic/retroperitoneal sarcoma.    PROCEDURE:    1.   Radical resection, left pelvic/retroperitoneal sarcoma, with resection of internal iliac artery and vein.   2.   Left ureterolysis.  3.   Exclusion of small bowel with omentum.  4.   Use of PlasmaJet to potentiate posterior margin.     CO-SURGEON:  Phong Jackson DO.    ASSISTANT:  Lamin Reddy [facility]; Kathryn Henderson PA-C; and Vale Mireles [facility].    ANESTHESIA:  General.    INDICATIONS:  Patient is a 63-year-old woman who was diagnosed with left pelvic/retroperitoneal sarcoma.  She received neoadjuvant radiation therapy and presents today for surgical management which had been discussed with her including indications and risks.      FINDINGS:  About 3 cm tumor involving internal iliac artery and vein with the patient's body habitus and prior radiation causing fibrosis, surgery was more difficult than usual.    OPERATIVE TECHNIQUE:  Patient was brought to the operating room and was placed in supine position.  She was given general anesthesia by the anesthesiology service.  Sequential compression boots were placed.  The patient received preoperative intravenous antibiotic prophylaxis.  She was prepped and draped in normal sterile fashion.  A low midline incision extended to above the umbilicus was made and deepened.  The fascia was incised.  The peritoneal cavity was entered under direct visualization.  The peritoneal cavity was entered.  I used a Hoyt retractor to help in exposure.  The small bowel was  retracted cephalad and the distal sigmoid was mobilized to the upper rectum, identifying a palpable mass posteriorly measuring about 3 to 4 cm.  Given location of tumor with fibrosis, left ureterolysis was necessary which was undertaken to the bladder itself.  The left common iliac artery was identified.  It was followed distally preserving the external iliac artery and identifying the internal iliac artery as involving with the tumor vicinity as the posterior peritoneum was incised.  Similarly, the iliac vein was noted to be involved somewhat distally.  The internal iliac vein was noted to be involved somewhat distally.  Circumferential dissection was then undertaken by myself and Dr. Jackson exposing the proximal sacral nerve roots.  Tributaries or branches from the iliac vessels to the tumor were divided between clips or ties, but it became necessary to divide the iliac artery and vein to achieve a gross negative margin.  I did preserve the left superior vesical artery and divided the left internal iliac artery just distal to this.  This was done between ties and clips.  The iliac vein was divided using vascular load stapler.  Circumferential dissection was then completed with the specimen sent to Pathology for permanent section evaluation.  The posterior peritoneum was resected as an extra margin (anterior to tumor), and I potentiated the posterior-to-tumor deep margin using PlasmaJet vaporization.  Hemostasis was achieved and maintained.  The pelvis was then irrigated with water.  For possible adjuvant radiation, I proceeded with excluding the small bowel using omentum which was mobilized using Sonicison dividing omental vessels of the right side and proceeding with excluding the bowels and suturing the omentum to surrounding peritoneum using 3-0 Vicryl sutures.  The fascia was then reapproximated using #1 PDS.  The skin was stapled.  Sterile dressings were then applied.    The patient tolerated the procedure well  without immediate complications.  She was extubated in the operating room and was sent in stable condition to recovery room.  Counts were correct.  I was present throughout the procedure.    Dictated By Christian Mary MD  d: 05/10/2024 17:16:23  t: 05/10/2024 19:11:16  Job [id number]  [facility]/

## 2024-05-11 NOTE — PHYSICAL THERAPY NOTE
PHYSICAL THERAPY EVALUATION - INPATIENT     Room Number: 7623/7623-A  Evaluation Date: 5/11/2024  Type of Evaluation: Initial  Physician Order: PT Eval and Treat    Presenting Problem: 63 y.o s/p day 1  of radical resection sarcoma left pelvis with resection of left internal iliac artery and vein. Exclusion of small bowel with omentum. Presenting with abdominal pain, nausea  Co-Morbidities : HTN, hypothyroidism, VANDA, peripheral neuropathy, chronic bronchitis, hypokalemia,  Reason for Therapy: Mobility Dysfunction and Discharge Planning    PHYSICAL THERAPY ASSESSMENT   Patient is currently functioning near baseline with bed mobility, transfers, gait, stair negotiation, maintaining seated position, and standing prolonged periods.  Prior to admission, patient's baseline is ambulate, cook, bathe herself with and without a shower chair, grocery shop with an electric grocery chair, and walk a block with a \" child buggy\".  Patient is requiring stand-by assist as a result of the following impairments: decreased functional strength, decreased endurance/aerobic capacity, pain, impaired motor planning, and cognitive deficits (due to Dilaudid medication usage ).  Physical Therapy will continue to follow for duration of hospitalization.    Patient will benefit from continued skilled PT Services at discharge to promote functional independence in home.  Anticipate patient will return home with home health PT.    PLAN  PT Treatment Plan: Bed mobility;Body mechanics;Coordination;Endurance;Energy conservation;Gait training;Strengthening;Stair training  Rehab Potential : Good  Frequency (Obs): 3-5x/week  Number of Visits to Meet Established Goals: 5      CURRENT GOALS    Goal #1 Patient is able to demonstrate supine - sit EOB @ level: independent     Goal #2 Patient is able to demonstrate transfers EOB to/from [facility] at assistance level: independent     Goal #3 Patient is able to ambulate 150 feet with assist device: none at  assistance level: independent     Goal #4    Goal #5    Goal #6    Goal Comments: Goals established on 2024      PHYSICAL THERAPY MEDICAL/SOCIAL HISTORY  History related to current admission: Patient is a 63 year old female admitted on 5/10/2024  for s/p 5.10.24 radical resection sarcoma of L pelvis, L internal iliac artery and vein, and small bowel with omentum. She is s/p day 1 of surgery and currently presents with abdominal pain and nausea.       HOME SITUATION  Type of Home: House   Home Layout: Two level  Stairs to Enter : 2  Railing: Yes  Stairs to Bedroom: 12  Railing: Yes    Lives With: Spouse;Daughter;Friend(s) (grandson 9 21 y.o0)  Drives: No  Patient Owned Equipment:  (\" Child buggy\")  Patient Regularly Uses: None    Prior Level of [place]: Prior to hospitalization she was able to ascend and descend her stairs with bilateral railings, cook, and clean independently. She has a shower chair that she uses at time but is able to perform hygiene activities independently.     SUBJECTIVE  Pt reports she has a 7/10 pain prior to movement and then 5/10 after movement.       OBJECTIVE  Precautions: Abdominal protective strategies;Limb alert - right;Limb alert - left  Fall Risk: High fall risk    WEIGHT BEARING RESTRICTION                   PAIN ASSESSMENT  Ratin  Location: abdominal  Management Techniques: Activity promotion    COGNITION  Overall Cognitive Status:  WFL - within functional limits    RANGE OF MOTION AND STRENGTH ASSESSMENT  Upper extremity ROM and strength are limited due to current IV lines.     Lower extremity ROM is within functional limits    Lower extremity strength is within functional limits       BALANCE  Static Sitting: Fair +  Dynamic Sitting: Fair +  Static Standing: Fair -  Dynamic Standing: Fair -    ADDITIONAL TESTS                                    ACTIVITY TOLERANCE                         O2 WALK  Oxygen Therapy  SPO2% on Room Air at Rest: 94    NEUROLOGICAL FINDINGS                         AM-PAC '6-Clicks' INPATIENT SHORT FORM - BASIC MOBILITY  How much difficulty does the patient currently have...  Patient Difficulty: Turning over in bed (including adjusting bedclothes, sheets and blankets)?: A Little   Patient Difficulty: Sitting down on and standing up from a chair with arms (e.g., wheelchair, bedside commode, etc.): A Little   Patient Difficulty: Moving from lying on back to sitting on the side of the bed?: A Little   How much help from another person does the patient currently need...   Help from Another: Moving to and from a bed to a chair (including a wheelchair)?: A Little   Help from Another: Need to walk in hospital room?: A Little   Help from Another: Climbing 3-5 steps with a railing?: A Lot       AM-PAC Score:  Raw Score: 17   Approx Degree of Impairment: 50.57%   Standardized Score (AM-PAC Scale): 42.13   CMS Modifier (G-Code): CK    FUNCTIONAL ABILITY STATUS  Gait Assessment   Functional Mobility/Gait Assessment  Gait Assistance: Contact guard assist  Distance (ft): 80  Assistive Device: Rolling walker  Pattern: L Decreased stance time;R Decreased stance time    Skilled Therapy Provided     Bed Mobility:  Rolling: Not assessed   Supine to sit: Contact guard   Sit to supine: not assessed      Transfer Mobility:  Sit to stand: contact guard    Stand to sit: minimal assist   Gait = contact guard     Therapist's Comments: Patient currently is able to ambulate with contact guard assist due to dizziness as she is still taking  the Dilaudid. Patient did not utilize abdominal binder during session.     Exercise/Education Provided:  Stair training, bed mobility, gait training     Patient End of Session: Up in chair;With  staff;Needs met;Call light within reach;RN aware of session/findings;All patient questions and concerns addressed;Alarm set      Patient Evaluation Complexity Level:  History Moderate - 1 or 2 personal factors and/or co-morbidities   Examination of body  systems Low - addressing 1-2 elements   Clinical Presentation Low - Stable   Clinical Decision Making Low - Stable       PT Session Time: 38 minutes  Therapeutic Activity: 15 minutes

## 2024-05-12 ENCOUNTER — APPOINTMENT (OUTPATIENT)
Dept: GENERAL RADIOLOGY | Facility: HOSPITAL | Age: 64
DRG: 826 | End: 2024-05-12
Attending: PHYSICIAN ASSISTANT

## 2024-05-12 ENCOUNTER — APPOINTMENT (OUTPATIENT)
Dept: GENERAL RADIOLOGY | Facility: HOSPITAL | Age: 64
DRG: 826 | End: 2024-05-12
Attending: SURGERY

## 2024-05-12 LAB
ALBUMIN SERPL-MCNC: 2.7 G/DL (ref 3.4–5)
ALBUMIN/GLOB SERPL: 0.8 {RATIO} (ref 1–2)
ALP LIVER SERPL-CCNC: 71 U/L
ALT SERPL-CCNC: 24 U/L
ANION GAP SERPL CALC-SCNC: 2 MMOL/L (ref 0–18)
AST SERPL-CCNC: 47 U/L (ref 15–37)
BASOPHILS # BLD AUTO: 0.09 X10(3) UL (ref 0–0.2)
BASOPHILS NFR BLD AUTO: 0.5 %
BILIRUB SERPL-MCNC: 0.5 MG/DL (ref 0.1–2)
BILIRUB UR QL STRIP.AUTO: NEGATIVE
BUN BLD-MCNC: 10 MG/DL (ref 9–23)
CALCIUM BLD-MCNC: 10.1 MG/DL (ref 8.5–10.1)
CHLORIDE SERPL-SCNC: 113 MMOL/L (ref 98–112)
CLARITY UR REFRACT.AUTO: CLEAR
CO2 SERPL-SCNC: 26 MMOL/L (ref 21–32)
COLOR UR AUTO: YELLOW
CREAT BLD-MCNC: 0.66 MG/DL
EGFRCR SERPLBLD CKD-EPI 2021: 100 ML/MIN/1.73M2 (ref 60–?)
EOSINOPHIL # BLD AUTO: 0.31 X10(3) UL (ref 0–0.7)
EOSINOPHIL NFR BLD AUTO: 1.8 %
ERYTHROCYTE [DISTWIDTH] IN BLOOD BY AUTOMATED COUNT: 12.9 %
GLOBULIN PLAS-MCNC: 3.6 G/DL (ref 2.8–4.4)
GLUCOSE BLD-MCNC: 112 MG/DL (ref 70–99)
GLUCOSE BLD-MCNC: 112 MG/DL (ref 70–99)
GLUCOSE UR STRIP.AUTO-MCNC: NORMAL MG/DL
HCT VFR BLD AUTO: 35.8 %
HGB BLD-MCNC: 11.1 G/DL
IMM GRANULOCYTES # BLD AUTO: 0.08 X10(3) UL (ref 0–1)
IMM GRANULOCYTES NFR BLD: 0.5 %
KETONES UR STRIP.AUTO-MCNC: 10 MG/DL
LEUKOCYTE ESTERASE UR QL STRIP.AUTO: NEGATIVE
LYMPHOCYTES # BLD AUTO: 1.5 X10(3) UL (ref 1–4)
LYMPHOCYTES NFR BLD AUTO: 8.7 %
MCH RBC QN AUTO: 29.6 PG (ref 26–34)
MCHC RBC AUTO-ENTMCNC: 31 G/DL (ref 31–37)
MCV RBC AUTO: 95.5 FL
MONOCYTES # BLD AUTO: 1.07 X10(3) UL (ref 0.1–1)
MONOCYTES NFR BLD AUTO: 6.2 %
NEUTROPHILS # BLD AUTO: 14.28 X10 (3) UL (ref 1.5–7.7)
NEUTROPHILS # BLD AUTO: 14.28 X10(3) UL (ref 1.5–7.7)
NEUTROPHILS NFR BLD AUTO: 82.3 %
NITRITE UR QL STRIP.AUTO: NEGATIVE
OSMOLALITY SERPL CALC.SUM OF ELEC: 292 MOSM/KG (ref 275–295)
PH UR STRIP.AUTO: 5.5 [PH] (ref 5–8)
PLATELET # BLD AUTO: 245 10(3)UL (ref 150–450)
POTASSIUM SERPL-SCNC: 4.3 MMOL/L (ref 3.5–5.1)
POTASSIUM SERPL-SCNC: 4.3 MMOL/L (ref 3.5–5.1)
PROT SERPL-MCNC: 6.3 G/DL (ref 6.4–8.2)
PROT UR STRIP.AUTO-MCNC: NEGATIVE MG/DL
RBC # BLD AUTO: 3.75 X10(6)UL
RBC UR QL AUTO: NEGATIVE
SODIUM SERPL-SCNC: 141 MMOL/L (ref 136–145)
SP GR UR STRIP.AUTO: 1.02 (ref 1–1.03)
UROBILINOGEN UR STRIP.AUTO-MCNC: NORMAL MG/DL
WBC # BLD AUTO: 17.3 X10(3) UL (ref 4–11)

## 2024-05-12 PROCEDURE — 71045 X-RAY EXAM CHEST 1 VIEW: CPT | Performed by: PHYSICIAN ASSISTANT

## 2024-05-12 PROCEDURE — 99232 SBSQ HOSP IP/OBS MODERATE 35: CPT | Performed by: HOSPITALIST

## 2024-05-12 RX ORDER — ACETAMINOPHEN 500 MG
1000 TABLET ORAL EVERY 6 HOURS
Status: DISCONTINUED | OUTPATIENT
Start: 2024-05-12 | End: 2024-05-16

## 2024-05-12 RX ORDER — HYDROMORPHONE HYDROCHLORIDE 1 MG/ML
0.8 INJECTION, SOLUTION INTRAMUSCULAR; INTRAVENOUS; SUBCUTANEOUS EVERY 2 HOUR PRN
Status: DISCONTINUED | OUTPATIENT
Start: 2024-05-12 | End: 2024-05-12

## 2024-05-12 RX ORDER — HYDROMORPHONE HYDROCHLORIDE 1 MG/ML
0.4 INJECTION, SOLUTION INTRAMUSCULAR; INTRAVENOUS; SUBCUTANEOUS EVERY 2 HOUR PRN
Status: DISCONTINUED | OUTPATIENT
Start: 2024-05-12 | End: 2024-05-12

## 2024-05-12 RX ORDER — HYDROMORPHONE HYDROCHLORIDE 1 MG/ML
1.2 INJECTION, SOLUTION INTRAMUSCULAR; INTRAVENOUS; SUBCUTANEOUS EVERY 2 HOUR PRN
Status: DISCONTINUED | OUTPATIENT
Start: 2024-05-12 | End: 2024-05-12

## 2024-05-12 RX ORDER — HYDROMORPHONE HYDROCHLORIDE 2 MG/1
4 TABLET ORAL EVERY 4 HOURS PRN
Status: DISCONTINUED | OUTPATIENT
Start: 2024-05-12 | End: 2024-05-14

## 2024-05-12 NOTE — PLAN OF CARE
*Urine output for my shift was 440ml and goal is 528. Patient had an urge to urinate but was unable to do it on her own. Pt needed to be straight cathed. Only 300 ml was out during the straight cath. Urine smelled strong due to concentration   * Pt still not belching or passing gas  *Abdomen seems distended still and slightly firm suprapubic and on sides of abdomen  *patient says it is slightly tender but not painful.   *Serosanguinous drainage is on tefla   *Binder is still in place   *Pt awake and alert, still on PCA Dilaudid, ketamine, and tylenol IV. Pt says her Chronic pain is more painful than the incision.   *Potassium was low at beginning of shift, the redraw was within range                 Problem: PAIN - ADULT  Goal: Verbalizes/displays adequate comfort level or patient's stated pain goal  Description: INTERVENTIONS:  - Encourage pt to monitor pain and request assistance  - Assess pain using appropriate pain scale  - Administer analgesics based on type and severity of pain and evaluate response  - Implement non-pharmacological measures as appropriate and evaluate response  - Consider cultural and social influences on pain and pain management  - Manage/alleviate anxiety  - Utilize distraction and/or relaxation techniques  - Monitor for opioid side effects  - Notify MD/LIP if interventions unsuccessful or patient reports new pain  - Anticipate increased pain with activity and pre-medicate as appropriate  Outcome: Progressing     Problem: RISK FOR INFECTION - ADULT  Goal: Absence of fever/infection during anticipated neutropenic period  Description: INTERVENTIONS  - Monitor WBC  - Administer growth factors as ordered  - Implement neutropenic guidelines  Outcome: Progressing     Problem: CARDIOVASCULAR - ADULT  Goal: Absence of cardiac arrhythmias or at baseline  Description: INTERVENTIONS:  - Continuous cardiac monitoring, monitor vital signs, obtain 12 lead EKG if indicated  - Evaluate effectiveness of  antiarrhythmic and heart rate control medications as ordered  - Initiate emergency measures for life threatening arrhythmias  - Monitor electrolytes and administer replacement therapy as ordered  Outcome: Progressing     Problem: GASTROINTESTINAL - ADULT  Goal: Minimal or absence of nausea and vomiting  Description: INTERVENTIONS:  - Maintain adequate hydration with IV or PO as ordered and tolerated  - Nasogastric tube to low intermittent suction as ordered  - Evaluate effectiveness of ordered antiemetic medications  - Provide nonpharmacologic comfort measures as appropriate  - Advance diet as tolerated, if ordered  - Obtain nutritional consult as needed  - Evaluate fluid balance  Outcome: Progressing  Goal: Maintains or returns to baseline bowel function  Description: INTERVENTIONS:  - Assess bowel function  - Maintain adequate hydration with IV or PO as ordered and tolerated  - Evaluate effectiveness of GI medications  - Encourage mobilization and activity  - Obtain nutritional consult as needed  - Establish a toileting routine/schedule  - Consider collaborating with pharmacy to review patient's medication profile  Outcome: Progressing  Goal: Maintains adequate nutritional intake (undernourished)  Description: INTERVENTIONS:  - Monitor percentage of each meal consumed  - Identify factors contributing to decreased intake, treat as appropriate  - Assist with meals as needed  - Monitor I&O, WT and lab values  - Obtain nutritional consult as needed  - Optimize oral hygiene and moisture  - Encourage food from home; allow for food preferences  - Enhance eating environment  Outcome: Progressing  Goal: Achieves appropriate nutritional intake (bariatric)  Description: INTERVENTIONS:  - Monitor for over-consumption  - Identify factors contributing to increased intake, treat as appropriate  - Monitor I&O, WT and lab values  - Obtain nutritional consult as needed  - Evaluate psychosocial factors contributing to  over-consumption  Outcome: Progressing     Problem: GENITOURINARY - ADULT  Goal: Absence of urinary retention  Description: INTERVENTIONS:  - Assess patient’s ability to void and empty bladder  - Monitor intake/output and perform bladder scan as needed  - Follow urinary retention protocol/standard of care  - Consider collaborating with pharmacy to review patient's medication profile  - Implement strategies to promote bladder emptying  Outcome: Progressing     Problem: METABOLIC/FLUID AND ELECTROLYTES - ADULT  Goal: Electrolytes maintained within normal limits  Description: INTERVENTIONS:  - Monitor labs and rhythm and assess patient for signs and symptoms of electrolyte imbalances  - Administer electrolyte replacement as ordered  - Monitor response to electrolyte replacements, including rhythm and repeat lab results as appropriate  - Fluid restriction as ordered  - Instruct patient on fluid and nutrition restrictions as appropriate  Outcome: Progressing     Problem: SKIN/TISSUE INTEGRITY - ADULT  Goal: Incision(s), wounds(s) or drain site(s) healing without S/S of infection  Description: INTERVENTIONS:  - Assess and document risk factors for pressure ulcer development  - Assess and document skin integrity  - Assess and document dressing/incision, wound bed, drain sites and surrounding tissue  - Implement wound care per orders  - Initiate isolation precautions as appropriate  - Initiate Pressure Ulcer prevention bundle as indicated  Outcome: Progressing

## 2024-05-12 NOTE — PROGRESS NOTES
[facility]  Progress Note    Citlali Leung Patient Status:  Inpatient    1960 MRN [id number]   [facility] 7NE-A Attending Christian Mary MD   Hosp Day # 2 PCP Janel Hendrickson     Subjective:  The patient states that she is feeling better today.  She reports improvement in her abdominal pain.  Aquino removed yesterday.  Patient has not been able to void.  She states that she has required straight catheterization x 2.  She reported mild nausea yesterday.  She denies nausea or vomiting today.  She denies bowel movement.  She denies flatus.  She denies shortness of breath or cough.  She denies fever or chills.    Objective/Physical Exam:  General: Alert, orientated x3.  Cooperative.  No apparent distress.  Vital Signs:  Blood pressure 156/86, pulse 98, temperature 99.3 °F (37.4 °C), temperature source Oral, resp. rate 14, height 62\", weight 190 lb (86.2 kg), SpO2 93%.  Wt Readings from Last 3 Encounters:   05/10/24 190 lb (86.2 kg)   04/10/24 189 lb (85.7 kg)   24 204 lb 3.2 oz (92.6 kg)     Lungs: No respiratory distress, 94% on 2 L.  Cardiac: Regular rate and rhythm.   Abdomen:  Soft, mildly distended, incisional tenderness, with no rebound or guarding.  No peritoneal signs.    Incision: Clean, dry, intact, no erythema      Intake/Output:    Intake/Output Summary (Last 24 hours) at 2024 0800  Last data filed at 2024 0743  Gross per 24 hour   Intake 1296.67 ml   Output 1700 ml   Net -403.33 ml     I/O last 3 completed shifts:  In: 2081.3 [I.V.:1681.3; IV PIGGYBACK:400]  Out: 3625 [Urine:3625]  I/O this shift:  In: 86 [I.V.:86]  Out: -     Medications:    acetaminophen  1,000 mg Oral q6h    enoxaparin  40 mg Subcutaneous Daily    famotidine  20 mg Oral BID    Or    famotidine  20 mg Intravenous BID    fluticasone furoate-vilanterol  1 puff Inhalation Daily    DULoxetine  60 mg Oral Daily    gabapentin  300 mg Oral TID    levothyroxine  175 mcg Oral Before breakfast     amLODIPine  5 mg Oral Daily    amLODIPine  2.5 mg Oral Nightly    losartan  50 mg Oral Daily    [START ON 5/13/2024] fentaNYL  1 patch Transdermal Q72H    [START ON 5/13/2024] fentaNYL  1 patch Transdermal Q72H       Labs:  Lab Results   Component Value Date    WBC 17.3 05/12/2024    HGB 11.1 05/12/2024    HCT 35.8 05/12/2024    .0 05/12/2024     Lab Results   Component Value Date     05/12/2024    K 4.3 05/12/2024    K 4.3 05/12/2024     05/12/2024    CO2 26.0 05/12/2024    BUN 10 05/12/2024    CREATSERUM 0.66 05/12/2024     05/12/2024    CA 10.1 05/12/2024    ALKPHO 71 05/12/2024    ALT 24 05/12/2024    AST 47 05/12/2024    BILT 0.5 05/12/2024    ALB 2.7 05/12/2024    TP 6.3 05/12/2024     No results found for: \"PT\", \"INR\"      Assessment  Patient Active Problem List   Diagnosis    Acute sinusitis, recurrence not specified, unspecified location    Arthritis    Bronchitis    Blepharitis of right lower eyelid    Breast pain, right    Other chronic pain    Chronic midline low back pain with left-sided sciatica    Cervical radiculopathy    Hypertension due to endocrine disorder    Hypokalemia    Insomnia    Left lumbar radiculopathy    Sarcoma of pelvic peritoneum (HCC)    Fibromyalgia    History of left breast cancer    Hyperlipidemia, unspecified hyperlipidemia type    Hypothyroidism, unspecified type    VANDA (generalized anxiety disorder)    Age-related osteoporosis without current pathological fracture    Abnormal EKG    COPD with acute exacerbation (HCC)    Diarrhea of presumed infectious origin    Disorder of thyroid    Generalized weakness    Hordeolum externum of right lower eyelid    Lumbar facet arthropathy    Osteopenia    Primary hyperparathyroidism (HCC)    Hypertension    Productive cough    Simple chronic bronchitis (HCC)    Vitamin D deficiency     Postop day 2 radical resection, left pelvic/retroperitoneal sarcoma with resection of internal iliac artery and vein   Urinary  retention    Plan:  Advance diet as tolerated per Dr Mary.  Continue analgesics with home medication of fentanyl patch.  We will restart patient's home medication of oral Dilaudid.  Discontinue Dilaudid PCA and ketamine.  Monitor urine output and renal function.  If patient requires straight catheterization again, plan to place Aquino catheter.  Encourage incentive spirometry  Ambulate and up to chair today.  Physical therapy following.  DVT prophylaxis with Lovenox  GI prophylaxis with Pepcid    Quality:  DVT Mechanical Prophylaxis:   SCDs, Early ambuation  DVT Pharmacologic Prophylaxis   Medication    enoxaparin (Lovenox) 40 MG/0.4ML SUBQ injection 40 mg                Code Status: Not on file  Aquino: No urinary catheter in place  Aquino Duration (in days):   Central line:    DAV:         Yesi Thapa PA-C  5/12/2024  8:00 AM    Without any complaints.  She reports no flatus or BM.  Did not require straight cath overnight for urinary retention.  Plan for placement Aquino catheter today.  WBC mildly elevated from 16-17.3 today.  Hemoglobin mildly diminished from 12.8-11.  Albumin low 2.7.  Patient afebrile however will check UA and chest x-ray today.  Encourage ambulation.  Encourage I-S.  I agree with the note above and attest to its accuracy with the following changes below after my interview and examination of the patient    The patient was seen and examined.  Available labs and radiology is noted.    Kavita White MD FACS    Please note that this report has been produced using speech recognition software and may contain errors related to that system including but not limited to errors in grammar, punctuation and spelling as well as words and phrases that possibly may have been recognized inappropriately.  If there are any questions or concerns please contact the dictating provider for clarification.    The 21st Century Cures Act makes medical notes like these available to patients in the interest of trans  parency. Please be advised this is a medical document. Medical documents are intended to carry relevant information, facts as evident, and the clinical opinion of the practitioner. The medical note is intended as peer to peer communication and may appear blunt or direct. It is written in medical language and may contain abbreviations or verbiage that are unfamiliar.   Again if there are any questions or concerns please contact the dictating provider for clarification.

## 2024-05-12 NOTE — PROGRESS NOTES
[facility]   part of [facility]     Hospitalist Progress Note     Citlali Leung Patient Status:  Inpatient    1960 MRN [id number]   Location [facility] 7NE-A Attending Christian Mary MD   Hosp Day # 2 PCP Janel Hendrickson     Chief Complaint: Abdominal pain    Subjective:     Patient still having abdominal pain today.  Mildly better from day prior.  No fever, no n/v.  No other acute complaints .     Objective:    Review of Systems:   A comprehensive review of systems was completed; pertinent positive and negatives stated in subjective.    Vital signs:  Temp:  [98.4 °F (36.9 °C)-99.7 °F (37.6 °C)] 99.1 °F (37.3 °C)  Pulse:  [] 95  Resp:  [12-36] 12  BP: (130-156)/(76-91) 136/76  SpO2:  [91 %-100 %] 94 %    Physical Exam:    General: No acute distress, pleasant   Respiratory: No wheezes, no rhonchi  Cardiovascular: S1, S2, regular rate and rhythm  Abdomen: Soft, mild tenderness, non-distended, incision c/d/i  Neuro: No new focal deficits.   Extremities: No edema    Diagnostic Data:    Labs:  Recent Labs   Lab 24  0649 24  0328   WBC 16.0* 17.3*   HGB 12.8 11.1*   MCV 89.0 95.5   .0 245.0       Recent Labs   Lab 24  0649 24  1615 24  0328   *  --  112*   BUN 5*  --  10   CREATSERUM 0.63  --  0.66   CA 9.2  --  10.1   ALB 2.8*  --  2.7*     --  141   K 3.3* 3.5 4.3  4.3     --  113*   CO2 24.0  --  26.0   ALKPHO 72  --  71   AST 45*  --  47*   ALT 24  --  24   BILT 0.5  --  0.5   TP 6.2*  --  6.3*       Estimated Creatinine Clearance: 70.8 mL/min (based on SCr of 0.66 mg/dL).    No results for input(s): \"TROP\", \"TROPHS\", \"CK\" in the last 168 hours.    No results for input(s): \"PTP\", \"INR\" in the last 168 hours.               Microbiology    No results found for this visit on 05/10/24.      Imaging: Reviewed in Epic.    Medications:    acetaminophen  1,000 mg Oral q6h    enoxaparin  40 mg Subcutaneous Daily    famotidine  20 mg Oral  BID    Or    famotidine  20 mg Intravenous BID    fluticasone furoate-vilanterol  1 puff Inhalation Daily    DULoxetine  60 mg Oral Daily    gabapentin  300 mg Oral TID    levothyroxine  175 mcg Oral Before breakfast    amLODIPine  5 mg Oral Daily    amLODIPine  2.5 mg Oral Nightly    losartan  50 mg Oral Daily    [START ON 5/13/2024] fentaNYL  1 patch Transdermal Q72H    [START ON 5/13/2024] fentaNYL  1 patch Transdermal Q72H       Assessment & Plan:      #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  #Leiomyosarcoma   Surgery on 5/10  Dilaudid pca and ketamine to discontinue today  Anti-emetics  Full liquid diet , advance per surgery  Lovenox, IS, ambulate  Post op care per surgery    #Postop urinary retention  Montor bladder scans, may need dennis insertion     #Essential HTN - amlodipine, losartan   #Hypothyroidism - Synthroid   #VANDA - Duloxetine   #Peripheral neuropathy - Gabapentin   #Chronic bronchitis - Breo, prn albuterol   #Hypokalemia - replete and trend     #Leukocytosis - post op reaction, remains afebrile, no cough or dysuria, monitor off abx at this time, if fever spike will initiate abx and get cultures      Terrance Velazquez MD    Supplementary Documentation:     Quality:  DVT Mechanical Prophylaxis:   SCDs, Early ambuation  DVT Pharmacologic Prophylaxis   Medication    enoxaparin (Lovenox) 40 MG/0.4ML SUBQ injection 40 mg                Code Status: Not on file  Dennis: No urinary catheter in place  Dennis Duration (in days): 2  Central line:    DAV:     Discharge is dependent on: surgical recovery   At this point Ms. Leung is expected to be discharge to: Home    The 21st Century Cures Act makes medical notes like these available to patients in the interest of transparency. Please be advised this is a medical document. Medical documents are intended to carry relevant information, facts as evident, and the clinical opinion of the practitioner. The medical note is intended as peer to  peer communication and may appear blunt or direct. It is written in medical language and may contain abbreviations or verbiage that are unfamiliar.

## 2024-05-12 NOTE — PLAN OF CARE
Assumed care at 0730  Patient on ketamine drip and Dilaudid PCA with scheduled Tylenol   Patients dennis removed , was straight cathed due to no urine output after removal   Patient up to bathroom and with PT.   Pt AOx4   Abdominal Binder in place   Pt tolerating full liquid diet   SCD's on   Call light within reach

## 2024-05-12 NOTE — CM/SW NOTE
PT evaluation reviewed to identify anticipated therapy needs. Per PT note:    HOME SITUATION  Type of Home: House   Home Layout: Two level  Stairs to Enter : 2  Railing: Yes  Stairs to Bedroom: 12  Railing: Yes     Lives With: Spouse;Daughter;Friend(s) (grandson 9 21 y.o0)  Drives: No  Patient Owned Equipment:  (\" Child buggy\")  Patient Regularly Uses: None     Prior Level of [place]: Prior to hospitalization she was able to ascend and descend her stairs with bilateral railings, cook, and clean independently. She has a shower chair that she uses at time but is able to perform hygiene activities independently.     HH referral sent via [employer]. SW/ANGELINA will follow up w/ pt to provide list of accepting agencies once available.     CM/SW will remain available for DC planning and/or support.     NEREYDA AlvaradoN, CMSRN    [id number]

## 2024-05-12 NOTE — PLAN OF CARE
Assumed care at 0730  AOx4   Ketamine drip and Dilaudid PCA discontinued   Started PRN Dilaudid IVF and scheduled PO Tylenol  Patient up to bathroom and with PT.   Patient was able to urinate without cath.   Abdominal Binder in place   Tolerating Diet   SCD's on   UA collected   Febrile during shift MD notified  Portable CXR completed   Started on Zosyn   Call light within reach   Plan of care reviewed with pt.

## 2024-05-12 NOTE — OCCUPATIONAL THERAPY NOTE
OCCUPATIONAL THERAPY EVALUATION - INPATIENT     Room Number: 7623/7623-A  Evaluation Date: 5/12/2024  Type of Evaluation: Initial  Presenting Problem: Sarcoma of pelvic peritoneum s/p radical resection, left pelvic/retroperitoneal sarcoma with resection of internal iliac artery and vein, 5/10/24    Physician Order: IP Consult to Occupational Therapy  Reason for Therapy: ADL/IADL Dysfunction and Discharge Planning    OCCUPATIONAL THERAPY ASSESSMENT   Patient is currently functioning near baseline with toileting, bathing, upper body dressing, lower body dressing, bed mobility, and transfers and pain. Prior to admission, patient's baseline is mod I to independent level.  Patient is requiring minimal assist and moderate assist as a result of the following impairments: decreased functional strength, decreased functional reach, decreased endurance, and pain. Occupational Therapy will continue to follow for duration of hospitalization.    Patient will benefit from continued skilled OT Services at discharge to promote functional independence in home.  Anticipate patient will return home with home health OT      History Related to Current Admission: Patient is a 63 year old female admitted on 5/10/2024 with Presenting Problem: Sarcoma of pelvic peritoneum s/p radical resection, left pelvic/retroperitoneal sarcoma with resection of internal iliac artery and vein, 5/10/24. Co-Morbidities : HTN, hypothyroidism, VANDA, peripheral neuropathy, chronic bronchitis, hypokalemia,    Recommendations for nursing staff:   Transfers: one person and RW  Toileting location: toilet     EVALUATION SESSION  Patient Start of Session: supine  FUNCTIONAL TRANSFER ASSESSMENT  Sit to Stand: Edge of Bed; Chair  Edge of Bed: Minimal Assist  Chair: Minimal Assist    BED MOBILITY  Rolling: Minimal Assist  Supine to Sit : Minimal Assist  Sit to Supine (OT): Minimal Assist  Scooting: SBA    BALANCE ASSESSMENT  Static Sitting: Supervision  Static Standing:  Contact Guard Assist    FUNCTIONAL ADL ASSESSMENT  Eating: Modified Independent  Grooming Seated: Modified Independent  LB Dressing Seated: Moderate Assist      ACTIVITY TOLERANCE:   Pt w/ c/o 7-10/10 pain during session while on continuous IV pain meds  Patient tolerated x > 4 min in standing/walking.                         O2 SATURATIONS       Cognition  Alert, oriented x 4 and able to follow simple motor commands with occ repetition d/t easily distracted.  Impaired working memory  Good safety awareness  Good awareness of errors made    Upper extremity  BUE ARom WFL    EDUCATION PROVIDED  Patient: Role of Occupational Therapy; Plan of Care; Discharge Recommendations  Patient's Response to Education: Verbalized Understanding; Requires Further Education  Family/Caregiver: Role of Occupational Therapy; Plan of Care; Discharge Recommendations  Family/Caregiver's Response to Education: Verbalized Understanding      Equipment used: RW  Demonstrates functional use, Would benefit from additional trial     Therapist comments: OT educated patient on safety,  sequencing, energy conservation, pain management, home modifications and adaptive equipment to increase independence with ADLs.    Patient instructed in abdominal protective strategies during functional transfers and self-care tasks.  Instructed in use of modified techniques to complete LB dressing and bathing.  Patient demonstrated good understanding.    Pt required increased time on toilet d/t attempting to urinate without success.  RN notified.    Patient End of Session: Up in chair;Call light within reach;Needs met;RN aware of session/findings;All patient questions and concerns addressed;Family present;Discussed recommendations with /    OCCUPATIONAL PROFILE    HOME SITUATION  Type of Home: House  Home Layout: Two level  Lives With: Spouse;Daughter;Friend(s) (grandson 9 21 y.o0)    Toilet and Equipment: Standard height toilet;Toilet riser  with arms  Shower/Tub and Equipment: Walk-in shower;Grab bar;Shower chair  Other Equipment:  (RW, w/c)       Hand Dominance: Right  Drives: No  Patient Regularly Uses: Glasses    Prior Level of Function: Mod independent with ADLs and functional mobility with use of w/c in the community.  Patient reports she was walking 1 block for exercise.     SUBJECTIVE   \"I have pain down my left leg too.\"    PAIN ASSESSMENT  Ratin  Location: incisional  Management Techniques: Repositioning;Nurse notified (IV continuous pain meds)    OBJECTIVE  Precautions: Abdominal protective strategies;Limb alert - right;Limb alert - left  Fall Risk: High fall risk    WEIGHT BEARING RESTRICTION  Weight Bearing Restriction: None                ASSESSMENTS    AM-PAC ‘6-Clicks’ Inpatient Daily Activity Short Form  -   Putting on and taking off regular lower body clothing?: A Lot  -   Bathing (including washing, rinsing, drying)?: A Lot  -   Toileting, which includes using toilet, bedpan or urinal? : A Lot  -   Putting on and taking off regular upper body clothing?: A Little  -   Taking care of personal grooming such as brushing teeth?: A Little  -   Eating meals?: A Little    AM-PAC Score:  Score: 15  Approx Degree of Impairment: 56.46%  Standardized Score (AM-PAC Scale): 34.69    PLAN  OT Treatment Plan: Balance activities;Energy conservation/work simplification techniques;ADL training;Functional transfer training;UE strengthening/ROM;Endurance training;Patient/Family education;Patient/Family training;Equipment eval/education;Compensatory technique education  Rehab Potential : Good  Frequency: 3-5x/week  Number of Visits to Meet Established Goals: 7    ADL Goals  Patient will perform toileting with supervision and AE PRN  Patient will perform LB dressing with supervision and AE PRN    Functional Transfer Goals  Patient will perform bed mobility supine to sit with supervision  Patient will perform bed mobility sit to supine with  supervision  Patient will perform toilet transfer with supervision          Patient Evaluation Complexity Level:   Occupational Profile/Medical History MODERATE - Expanded review of history including review of medical or therapy record   Specific performance deficits impacting engagement in ADL/IADL MODERATE  3 - 5 performance deficits   Client Assessment/Performance Deficits MODERATE - Comorbidities and min to mod modifications of tasks    Clinical Decision Making LOW - Analysis of occupational profile, problem-focused assessments, limited treatment options    Overall Complexity LOW     OT Session Time: 35 minutes  Self-Care Home Management: 15 minutes

## 2024-05-13 LAB
ALBUMIN SERPL-MCNC: 2.6 G/DL (ref 3.4–5)
ALBUMIN/GLOB SERPL: 0.7 {RATIO} (ref 1–2)
ALP LIVER SERPL-CCNC: 84 U/L
ALT SERPL-CCNC: 24 U/L
ANION GAP SERPL CALC-SCNC: 6 MMOL/L (ref 0–18)
AST SERPL-CCNC: 46 U/L (ref 15–37)
BASOPHILS # BLD AUTO: 0.06 X10(3) UL (ref 0–0.2)
BASOPHILS NFR BLD AUTO: 0.4 %
BILIRUB SERPL-MCNC: 0.6 MG/DL (ref 0.1–2)
BUN BLD-MCNC: 9 MG/DL (ref 9–23)
CALCIUM BLD-MCNC: 9.9 MG/DL (ref 8.5–10.1)
CHLORIDE SERPL-SCNC: 112 MMOL/L (ref 98–112)
CO2 SERPL-SCNC: 24 MMOL/L (ref 21–32)
CREAT BLD-MCNC: 0.44 MG/DL
EGFRCR SERPLBLD CKD-EPI 2021: 110 ML/MIN/1.73M2 (ref 60–?)
EOSINOPHIL # BLD AUTO: 0.47 X10(3) UL (ref 0–0.7)
EOSINOPHIL NFR BLD AUTO: 3.2 %
ERYTHROCYTE [DISTWIDTH] IN BLOOD BY AUTOMATED COUNT: 12.8 %
GLOBULIN PLAS-MCNC: 3.9 G/DL (ref 2.8–4.4)
GLUCOSE BLD-MCNC: 109 MG/DL (ref 70–99)
GLUCOSE BLD-MCNC: 127 MG/DL (ref 70–99)
HCT VFR BLD AUTO: 33.7 %
HGB BLD-MCNC: 10.5 G/DL
IMM GRANULOCYTES # BLD AUTO: 0.07 X10(3) UL (ref 0–1)
IMM GRANULOCYTES NFR BLD: 0.5 %
LYMPHOCYTES # BLD AUTO: 1.28 X10(3) UL (ref 1–4)
LYMPHOCYTES NFR BLD AUTO: 8.6 %
MAGNESIUM SERPL-MCNC: 2.1 MG/DL (ref 1.6–2.6)
MCH RBC QN AUTO: 30.2 PG (ref 26–34)
MCHC RBC AUTO-ENTMCNC: 31.2 G/DL (ref 31–37)
MCV RBC AUTO: 96.8 FL
MONOCYTES # BLD AUTO: 1.2 X10(3) UL (ref 0.1–1)
MONOCYTES NFR BLD AUTO: 8.1 %
NEUTROPHILS # BLD AUTO: 11.81 X10 (3) UL (ref 1.5–7.7)
NEUTROPHILS # BLD AUTO: 11.81 X10(3) UL (ref 1.5–7.7)
NEUTROPHILS NFR BLD AUTO: 79.2 %
OSMOLALITY SERPL CALC.SUM OF ELEC: 293 MOSM/KG (ref 275–295)
PLATELET # BLD AUTO: 239 10(3)UL (ref 150–450)
POTASSIUM SERPL-SCNC: 3.7 MMOL/L (ref 3.5–5.1)
PROT SERPL-MCNC: 6.5 G/DL (ref 6.4–8.2)
RBC # BLD AUTO: 3.48 X10(6)UL
SODIUM SERPL-SCNC: 142 MMOL/L (ref 136–145)
WBC # BLD AUTO: 14.9 X10(3) UL (ref 4–11)

## 2024-05-13 PROCEDURE — 99233 SBSQ HOSP IP/OBS HIGH 50: CPT | Performed by: HOSPITALIST

## 2024-05-13 RX ORDER — POTASSIUM CHLORIDE 14.9 MG/ML
20 INJECTION INTRAVENOUS ONCE
Status: DISCONTINUED | OUTPATIENT
Start: 2024-05-13 | End: 2024-05-14

## 2024-05-13 RX ORDER — LABETALOL HYDROCHLORIDE 5 MG/ML
10 INJECTION, SOLUTION INTRAVENOUS EVERY 4 HOURS PRN
Status: CANCELLED | OUTPATIENT
Start: 2024-05-13

## 2024-05-13 RX ORDER — POTASSIUM CHLORIDE 20 MEQ/1
20 TABLET, EXTENDED RELEASE ORAL ONCE
Status: COMPLETED | OUTPATIENT
Start: 2024-05-13 | End: 2024-05-13

## 2024-05-13 NOTE — PLAN OF CARE
Assumed pt care at 1930 for the night shift. Pt resting in bed.  POD #2. Midline incision w/ staples, topifoam and abd binder.   Pain control w/ scheduled Tylenol ES and Dilaudid IV/PO prn.   Denies any n/v. Tolerating FLD. Abd soft. Hypoactive BS.   Denies passing gas but reports belching. VSS. Low grade temp this evening.   UA sent. Voiding freely dariana colored urine. Up brp w/ sba x 1 and a walker.   O2 sats 87-88% on RA post activity. Remains on 2L O2 w/ O2 sats >92% overnight.  Encouraged IS, deep breathing a nd coughing. Zosyn given as scheduled.   Pt updated on poc. All safety precautions maintained. Will cont to monitor.    Problem: PAIN - ADULT  Goal: Verbalizes/displays adequate comfort level or patient's stated pain goal  Description: INTERVENTIONS:  - Encourage pt to monitor pain and request assistance  - Assess pain using appropriate pain scale  - Administer analgesics based on type and severity of pain and evaluate response  - Implement non-pharmacological measures as appropriate and evaluate response  - Consider cultural and social influences on pain and pain management  - Manage/alleviate anxiety  - Utilize distraction and/or relaxation techniques  - Monitor for opioid side effects  - Notify MD/LIP if interventions unsuccessful or patient reports new pain  - Anticipate increased pain with activity and pre-medicate as appropriate  Outcome: Progressing     Problem: RISK FOR INFECTION - ADULT  Goal: Absence of fever/infection during anticipated neutropenic period  Description: INTERVENTIONS  - Monitor WBC  - Administer growth factors as ordered  - Implement neutropenic guidelines  Outcome: Progressing     Problem: GASTROINTESTINAL - ADULT  Goal: Minimal or absence of nausea and vomiting  Description: INTERVENTIONS:  - Maintain adequate hydration with IV or PO as ordered and tolerated  - Nasogastric tube to low intermittent suction as ordered  - Evaluate effectiveness of ordered antiemetic medications  -  Provide nonpharmacologic comfort measures as appropriate  - Advance diet as tolerated, if ordered  - Obtain nutritional consult as needed  - Evaluate fluid balance  Outcome: Progressing     Problem: GENITOURINARY - ADULT  Goal: Absence of urinary retention  Description: INTERVENTIONS:  - Assess patient’s ability to void and empty bladder  - Monitor intake/output and perform bladder scan as needed  - Follow urinary retention protocol/standard of care  - Consider collaborating with pharmacy to review patient's medication profile  - Implement strategies to promote bladder emptying  Outcome: Progressing     Problem: SKIN/TISSUE INTEGRITY - ADULT  Goal: Incision(s), wounds(s) or drain site(s) healing without S/S of infection  Description: INTERVENTIONS:  - Assess and document risk factors for pressure ulcer development  - Assess and document skin integrity  - Assess and document dressing/incision, wound bed, drain sites and surrounding tissue  - Implement wound care per orders  - Initiate isolation precautions as appropriate  - Initiate Pressure Ulcer prevention bundle as indicated  Outcome: Progressing

## 2024-05-13 NOTE — PAYOR COMM NOTE
--------------  CONTINUED STAY REVIEW  5/12-5/13  Payor: ARIEL JO/ANAYELI  Subscriber #:  [id number]  Authorization Number: [id number]    Admit date: 5/10/24  Admit time:  5:37 AM    REVIEW DOCUMENTATION:    5/12 Surgery  Subjective:  The patient states that she is feeling better today.  She reports improvement in her abdominal pain.  Aquino removed yesterday.  Patient has not been able to void.  She states that she has required straight catheterization x 2.  She reported mild nausea yesterday.  She denies nausea or vomiting today.  She denies bowel movement.  She denies flatus.  She denies shortness of breath or cough.  She denies fever or chills.     Objective/Physical Exam:  General: Alert, orientated x3.  Cooperative.  No apparent distress.  Vital Signs:  Blood pressure 156/86, pulse 98, temperature 99.3 °F (37.4 °C), temperature source Oral, resp. rate 14, height 62\", weight 190 lb (86.2 kg), SpO2 93%.     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  Continue analgesics with home medication of fentanyl patch.  We will restart patient's home medication of oral Dilaudid.  Discontinue Dilaudid PCA and ketamine.  Monitor urine output and renal function.  If patient requires straight catheterization again, plan to place Aquino catheter.  Encourage incentive spirometry  Ambulate and up to chair today.  Physical therapy following.  DVT prophylaxis with Lovenox  GI prophylaxis with Pepcid          5/12 IM      Patient still having abdominal pain today. Mildly better from day prior.     Vital signs:  Temp:  [98.4 °F (36.9 °C)-99.7 °F (37.6 °C)] 99.1 °F (37.3 °C)  Pulse:  [] 95  Resp:  [12-36] 12  BP: (130-156)/(76-91) 136/76  SpO2:  [91 %-100 %] 94 %     Physical Exam:    General: No acute distress, pleasant   Respiratory: No wheezes, no  rhonchi  Cardiovascular: S1, S2, regular rate and rhythm  Abdomen: Soft, mild tenderness, non-distended, incision c/d/i  Neuro: No new focal deficits.   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  #Leiomyosarcoma   Surgery on 5/10  Dilaudid pca and ketamine to discontinue today  Anti-emetics  Full liquid diet , advance per surgery  Lovenox, IS, ambulate  Post op care per surgery     #Postop urinary retention  Montor bladder scans, may need dennis insertion      #Essential HTN - amlodipine, losartan   #Hypothyroidism - Synthroid   #VANDA - Duloxetine   #Peripheral neuropathy - Gabapentin   #Chronic bronchitis - Breo, prn albuterol   #Hypokalemia - replete and trend      #Leukocytosis - post op reaction, remains afebrile, no cough or dysuria, monitor off abx at this time, if fever spike will initiate abx and get cultures            5/13 Surgery  POD#3 s/p radial resection sarcoma left pelvis with resection of left internal iliac artery and vein     Patient feels well this morning. Denies nausea, vomiting or feeling bloated. Tolerating full liquid diet. Pain controlled on current regimen. Denies current fevers or chills. Has mild cramping pain, no BM, but passed gas yesterday. Voiding freely, she states it just takes a minute to start a stream. Denies shortness of breath.      Blood pressure 135/76, pulse  86, temperature 98.9 °F (37.2 °C), temperature source Oral, resp. rate 12, height 1.575 m (5' 2\"), weight 86.2 kg (190 lb), SpO2 92%.    Abdomen: Soft, non-tender, mildly distended. Incision healing well without drainage or erythema.      - No acute surgical issues  - CXR with medial right lung consolidation concerning for pneumonia. On Zosyn. Leukocytosis improving. Will discuss antibiotic regimen with hospitalist.   - OK to advance to soft diet. Can stop IVF if tolerated  - Continue current pain regimen, tylenol q6hrs, dilaudid IV and PO PRN, fentanyl patch  - CTM UOP  - Encourage ambulation and IS  - PT/OT  - VTE prophylaxis     MEDICATIONS ADMINISTERED IN LAST 1 DAY:  acetaminophen (Tylenol Extra Strength) tab 1,000 mg       Date Action Dose Route User    5/13/2024 0857 Given 1,000 mg Oral Tabitha Nguyen RN    5/13/2024 0122 Given 1,000 mg Oral Vita Norman RN    5/12/2024 2009 Given 1,000 mg Oral Vita Norman RN    5/12/2024 1326 Given 1,000 mg Oral Marychuy Scott RN          amLODIPine (Norvasc) tab 2.5 mg       Date Action Dose Route User    5/12/2024 2009 Given 2.5 mg Oral Vita Norman RN          amLODIPine (Norvasc) tab 5 mg       Date Action Dose Route User    5/13/2024 0857 Given 5 mg Oral Tabitha Nguyen RN          DULoxetine (Cymbalta) DR cap 60 mg       Date Action Dose Route User    5/13/2024 0856 Given 60 mg Oral Tabitha Nguyen RN          enoxaparin (Lovenox) 40 MG/0.4ML SUBQ injection 40 mg       Date Action Dose Route User    5/13/2024 0857 Given 40 mg Subcutaneous (Right Lower Abdomen) Tabitha Nguyen RN          famotidine (Pepcid) tab 20 mg       Date Action Dose Route User    5/13/2024 0856 Given 20 mg Oral Tabitha Nguyen RN    5/12/2024 2009 Given 20 mg Oral Vita Norman RN          fentaNYL (Duragesic) 12 MCG/HR patch 1 patch       Date Action Dose Route User    5/13/2024 0203 Fentanyl Patch Applied 1 patch Transdermal (Left Upper Arm) Vita Norman,  RN          fentaNYL (Duragesic) 25 MCG/HR patch 1 patch       Date Action Dose Route User    5/13/2024 0203 Fentanyl Patch Applied 1 patch Transdermal (Left Upper Arm) Vita Norman RN          fluticasone furoate-vilanterol (Breo Ellipta) 200-25 MCG/ACT inhaler 1 puff       Date Action Dose Route User    5/13/2024 0858 Given 1 puff Inhalation Tabitha Nguyen RN    5/12/2024 1802 Given 1 puff Inhalation Marychuy Scott RN          gabapentin (Neurontin) cap 300 mg       Date Action Dose Route User    5/13/2024 0856 Given 300 mg Oral Tabitha Nguyen RN    5/12/2024 2009 Given 300 mg Oral Vita Norman RN    5/12/2024 1639 Given 300 mg Oral Marychuy Scott RN          HYDROmorphone (Dilaudid) 1 MG/ML injection 1 mg       Date Action Dose Route User    5/13/2024 0214 Given 1 mg Intravenous Vita Norman RN    5/12/2024 2230 Given 1 mg Intravenous Vita Norman RN    5/12/2024 1642 Given 1 mg Intravenous Marychuy Scott RN    5/12/2024 1321 Given 1 mg Intravenous Marychuy Scott RN          HYDROmorphone (Dilaudid) tab 4 mg       Date Action Dose Route User    5/13/2024 0551 Given 4 mg Oral Vita Norman RN    5/12/2024 1903 Given 4 mg Oral Marychuy Scott RN    5/12/2024 1120 Given 4 mg Oral Marychuy Scott RN          lactated ringers infusion       Date Action Dose Route User    5/12/2024 2019 New Bag (none) Intravenous Vita Norman RN          losartan (Cozaar) tab 50 mg       Date Action Dose Route User    5/13/2024 0856 Given 50 mg Oral Tabitha Nguyen RN          piperacillin-tazobactam (Zosyn) 3.375 g in dextrose 5% 100 mL IVPB-ADDV       Date Action Dose Route User    5/13/2024 0857 New Bag 3.375 g Intravenous Tabitha Nguyen RN    5/13/2024 0121 New Bag 3.375 g Intravenous Vita Norman RN    5/12/2024 1841 New Bag 3.375 g Intravenous Marychuy Scott RN          levothyroxine (Synthroid) tab 175 mcg       Date Action Dose Route User    5/13/2024 0551 Given  175 mcg Oral Vita Norman RN            Vitals (last day)       Date/Time Temp Pulse Resp BP SpO2 Weight O2 Device O2 Flow Rate (L/min) [facility]    05/13/24 0815 98 °F (36.7 °C) -- -- -- -- -- -- -- CR    05/13/24 0759 -- -- -- -- -- -- Nasal cannula 1.5 L/min KS    05/13/24 0500 98.9 °F (37.2 °C) 86 12 135/76 92 % -- Nasal cannula -- VO    05/13/24 0000 98.6 °F (37 °C) 94 11 132/85 94 % -- Nasal cannula -- VO    05/12/24 2007 99.6 °F (37.6 °C) 95 18 146/79 94 % -- Nasal cannula 1.5 L/min SK    05/12/24 1730 -- 96 24 -- 92 % -- -- --     05/12/24 1645 100.3 °F (37.9 °C) 102 13 135/78 94 % -- Nasal cannula --     05/12/24 1515 -- 118 32 -- 90 % -- -- --     05/12/24 1400 -- 109 18 131/81 94 % -- -- -- TJ    05/12/24 1215 -- -- -- 179/124 -- -- -- -- TJ    05/12/24 1215 99.1 °F (37.3 °C) 108 21 -- 94 % -- Nasal cannula 1.5 L/min     05/12/24 0730 99.1 °F (37.3 °C) 95 12 136/76 94 % -- Nasal cannula 1.5 L/min     05/12/24 0400 99.3 °F (37.4 °C) 98 14 156/86 93 % -- Nasal cannula -- VO    05/12/24 0000 99.7 °F (37.6 °C) 95 13 139/91 93 % -- Nasal cannula -- VO          CIWA Scores (since admission)       None

## 2024-05-13 NOTE — PROGRESS NOTES
[facility]   part of [facility]     Hospitalist Progress Note     Citlali Leung Patient Status:  Inpatient    1960 MRN [id number]   Location [facility] 7NE-A Attending Christian Mary MD   Hosp Day # 3 PCP Janel Hendrickson     Chief Complaint: Abdominal pain    Subjective:     Patient had an episode of vomiting this morning.  She feels slightly better since then.  She also had a fever overnight.  Denies cp, sob.  Denies passing gas/bm.  No other acute complaints.      Objective:    Review of Systems:   A comprehensive review of systems was completed; pertinent positive and negatives stated in subjective.    Vital signs:  Temp:  [98 °F (36.7 °C)-100.3 °F (37.9 °C)] 98 °F (36.7 °C)  Pulse:  [] 95  Resp:  [11-32] 18  BP: (131-176)/(76-90) 176/90  SpO2:  [90 %-94 %] 91 %    Physical Exam:    General: No acute distress, pleasant   Respiratory: Course BS  Cardiovascular: S1, S2, regular rate and rhythm  Abdomen: Soft, mild tenderness, non-distended, incision c/d/i  Neuro: No new focal deficits.   Extremities: No edema    Diagnostic Data:    Labs:  Recent Labs   Lab 24  0649 24  0328 24  0433   WBC 16.0* 17.3* 14.9*   HGB 12.8 11.1* 10.5*   MCV 89.0 95.5 96.8   .0 245.0 239.0       Recent Labs   Lab 24  0649 24  1615 24  0328 24  0433   *  --  112* 109*   BUN 5*  --  10 9   CREATSERUM 0.63  --  0.66 0.44*   CA 9.2  --  10.1 9.9   ALB 2.8*  --  2.7* 2.6*     --  141 142   K 3.3* 3.5 4.3  4.3 3.7     --  113* 112   CO2 24.0  --  26.0 24.0   ALKPHO 72  --  71 84   AST 45*  --  47* 46*   ALT 24  --  24 24   BILT 0.5  --  0.5 0.6   TP 6.2*  --  6.3* 6.5       Estimated Creatinine Clearance: 106.2 mL/min (A) (based on SCr of 0.44 mg/dL (L)).    No results for input(s): \"TROP\", \"TROPHS\", \"CK\" in the last 168 hours.    No results for input(s): \"PTP\", \"INR\" in the last 168 hours.               Microbiology    No results found for  this visit on 05/10/24.      Imaging: Reviewed in Epic.    Medications:    potassium chloride  20 mEq Intravenous Once    acetaminophen  1,000 mg Oral q6h    piperacillin-tazobactam  3.375 g Intravenous Q8H ADELIA    enoxaparin  40 mg Subcutaneous Daily    famotidine  20 mg Oral BID    Or    famotidine  20 mg Intravenous BID    fluticasone furoate-vilanterol  1 puff Inhalation Daily    DULoxetine  60 mg Oral Daily    gabapentin  300 mg Oral TID    levothyroxine  175 mcg Oral Before breakfast    amLODIPine  5 mg Oral Daily    amLODIPine  2.5 mg Oral Nightly    losartan  50 mg Oral Daily    fentaNYL  1 patch Transdermal Q72H    fentaNYL  1 patch Transdermal Q72H       Assessment & Plan:      #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  #Leiomyosarcoma   Surgery on 5/10  Dilaudid pca and ketamine to discontinue today  Anti-emetics  Soft diet, advance per surgery  Lovenox, IS, ambulate  Post op care per surgery    #Sepsis due to Pneumonia  Fever, tachycardia, WBC 14.9  CXR showed right middle lobe consolidation   Zosyn started  F/u blood cultures    #Postop urinary retention  Montor bladder scans, may need dennis insertion   U/a clear    #Essential HTN - amlodipine, losartan   #Hypothyroidism - Synthroid   #VANDA - Duloxetine   #Peripheral neuropathy - Gabapentin   #Chronic bronchitis - Breo, prn albuterol   #Hypokalemia - replete and trend   #Post op anemia:  hg 10.5, trend hg        Terrance Velazquez MD    Supplementary Documentation:     Quality:  DVT Mechanical Prophylaxis:   SCDs, Early ambuation  DVT Pharmacologic Prophylaxis   Medication    enoxaparin (Lovenox) 40 MG/0.4ML SUBQ injection 40 mg                Code Status: Not on file  Dennis: No urinary catheter in place  Dennis Duration (in days): 2  Central line:    DAV:     Discharge is dependent on: surgical recovery   At this point Ms. Leung is expected to be discharge to: Home    The 21st Century Cures Act makes medical notes like these  available to patients in the interest of transparency. Please be advised this is a medical document. Medical documents are intended to carry relevant information, facts as evident, and the clinical opinion of the practitioner. The medical note is intended as peer to peer communication and may appear blunt or direct. It is written in medical language and may contain abbreviations or verbiage that are unfamiliar.

## 2024-05-13 NOTE — PROGRESS NOTES
POD#3 s/p radial resection sarcoma left pelvis with resection of left internal iliac artery and vein    Patient feels well this morning. Denies nausea, vomiting or feeling bloated. Tolerating full liquid diet. Pain controlled on current regimen. Denies current fevers or chills. Has mild cramping pain, no BM, but passed gas yesterday. Voiding freely, she states it just takes a minute to start a stream. Denies shortness of breath.     Blood pressure 135/76, pulse 86, temperature 98.9 °F (37.2 °C), temperature source Oral, resp. rate 12, height 1.575 m (5' 2\"), weight 86.2 kg (190 lb), SpO2 92%.    Intake/Output Summary (Last 24 hours) at 5/13/2024 0856  Last data filed at 5/13/2024 0400  Gross per 24 hour   Intake 1283.33 ml   Output 800 ml   Net 483.33 ml     Lab Results   Component Value Date    WBC 14.9 05/13/2024    HGB 10.5 05/13/2024    HCT 33.7 05/13/2024    .0 05/13/2024    CREATSERUM 0.44 05/13/2024    BUN 9 05/13/2024     05/13/2024    K 3.7 05/13/2024     05/13/2024    CO2 24.0 05/13/2024     05/13/2024    CA 9.9 05/13/2024    ALB 2.6 05/13/2024    ALKPHO 84 05/13/2024    BILT 0.6 05/13/2024    TP 6.5 05/13/2024    AST 46 05/13/2024    ALT 24 05/13/2024    MG 2.1 05/13/2024     Constitutional:  NAD.   Eyes: non-icteric.    Abdomen: Soft, non-tender, mildly distended. Incision healing well without drainage or erythema.  Musculoskeletal: no edema.     - No acute surgical issues  - CXR with medial right lung consolidation concerning for pneumonia. On Zosyn. Leukocytosis improving. Will discuss antibiotic regimen with hospitalist.   - OK to advance to soft diet. Can stop IVF if tolerated  - Continue current pain regimen, tylenol q6hrs, dilaudid IV and PO PRN, fentanyl patch  - CTM UOP  - Encourage ambulation and IS  - PT/OT  - VTE prophylaxis     JAIMIE Fry    Attending:  I saw and examined patient.  She is feeling well.  Surgery discussed and events over the weekend  noted.  Christian Mary MD

## 2024-05-14 ENCOUNTER — APPOINTMENT (OUTPATIENT)
Dept: GENERAL RADIOLOGY | Facility: HOSPITAL | Age: 64
DRG: 826 | End: 2024-05-14
Attending: HOSPITALIST

## 2024-05-14 ENCOUNTER — APPOINTMENT (OUTPATIENT)
Dept: CT IMAGING | Facility: HOSPITAL | Age: 64
DRG: 826 | End: 2024-05-14

## 2024-05-14 ENCOUNTER — APPOINTMENT (OUTPATIENT)
Dept: ULTRASOUND IMAGING | Facility: HOSPITAL | Age: 64
DRG: 826 | End: 2024-05-14

## 2024-05-14 LAB
ANION GAP SERPL CALC-SCNC: 6 MMOL/L (ref 0–18)
BASOPHILS # BLD AUTO: 0.08 X10(3) UL (ref 0–0.2)
BASOPHILS NFR BLD AUTO: 0.5 %
BLOOD TYPE BARCODE: 5100
BUN BLD-MCNC: 7 MG/DL (ref 9–23)
CALCIUM BLD-MCNC: 9.7 MG/DL (ref 8.5–10.1)
CHLORIDE SERPL-SCNC: 107 MMOL/L (ref 98–112)
CO2 SERPL-SCNC: 27 MMOL/L (ref 21–32)
CREAT BLD-MCNC: 0.54 MG/DL
EGFRCR SERPLBLD CKD-EPI 2021: 105 ML/MIN/1.73M2 (ref 60–?)
EOSINOPHIL # BLD AUTO: 0.23 X10(3) UL (ref 0–0.7)
EOSINOPHIL NFR BLD AUTO: 1.4 %
ERYTHROCYTE [DISTWIDTH] IN BLOOD BY AUTOMATED COUNT: 12.4 %
GLUCOSE BLD-MCNC: 109 MG/DL (ref 70–99)
HCT VFR BLD AUTO: 32 %
HGB BLD-MCNC: 10.7 G/DL
IMM GRANULOCYTES # BLD AUTO: 0.08 X10(3) UL (ref 0–1)
IMM GRANULOCYTES NFR BLD: 0.5 %
LYMPHOCYTES # BLD AUTO: 1.69 X10(3) UL (ref 1–4)
LYMPHOCYTES NFR BLD AUTO: 10.3 %
MCH RBC QN AUTO: 30.6 PG (ref 26–34)
MCHC RBC AUTO-ENTMCNC: 33.4 G/DL (ref 31–37)
MCV RBC AUTO: 91.4 FL
MONOCYTES # BLD AUTO: 1.1 X10(3) UL (ref 0.1–1)
MONOCYTES NFR BLD AUTO: 6.7 %
NEUTROPHILS # BLD AUTO: 13.29 X10 (3) UL (ref 1.5–7.7)
NEUTROPHILS # BLD AUTO: 13.29 X10(3) UL (ref 1.5–7.7)
NEUTROPHILS NFR BLD AUTO: 80.6 %
OSMOLALITY SERPL CALC.SUM OF ELEC: 289 MOSM/KG (ref 275–295)
PLATELET # BLD AUTO: 260 10(3)UL (ref 150–450)
POTASSIUM SERPL-SCNC: 3.3 MMOL/L (ref 3.5–5.1)
RBC # BLD AUTO: 3.5 X10(6)UL
SODIUM SERPL-SCNC: 140 MMOL/L (ref 136–145)
UNIT VOLUME: 350 ML
WBC # BLD AUTO: 16.5 X10(3) UL (ref 4–11)

## 2024-05-14 PROCEDURE — 74176 CT ABD & PELVIS W/O CONTRAST: CPT

## 2024-05-14 PROCEDURE — 74018 RADEX ABDOMEN 1 VIEW: CPT | Performed by: HOSPITALIST

## 2024-05-14 PROCEDURE — 93970 EXTREMITY STUDY: CPT

## 2024-05-14 PROCEDURE — 99233 SBSQ HOSP IP/OBS HIGH 50: CPT | Performed by: HOSPITALIST

## 2024-05-14 RX ORDER — PREDNISONE 50 MG/1
50 TABLET ORAL EVERY 6 HOURS
Qty: 3 TABLET | Refills: 0 | Status: DISCONTINUED | OUTPATIENT
Start: 2024-05-14 | End: 2024-05-14

## 2024-05-14 RX ORDER — POTASSIUM CHLORIDE 20 MEQ/1
40 TABLET, EXTENDED RELEASE ORAL 2 TIMES DAILY
Status: DISCONTINUED | OUTPATIENT
Start: 2024-05-14 | End: 2024-05-16

## 2024-05-14 RX ORDER — POTASSIUM CHLORIDE 1.5 G/1.58G
40 POWDER, FOR SOLUTION ORAL 2 TIMES DAILY
Status: DISCONTINUED | OUTPATIENT
Start: 2024-05-14 | End: 2024-05-14

## 2024-05-14 RX ORDER — HYDROMORPHONE HYDROCHLORIDE 2 MG/1
4 TABLET ORAL EVERY 4 HOURS
Status: DISCONTINUED | OUTPATIENT
Start: 2024-05-14 | End: 2024-05-16

## 2024-05-14 RX ORDER — DIPHENHYDRAMINE HCL 25 MG
50 CAPSULE ORAL ONCE
Qty: 2 CAPSULE | Refills: 0 | Status: DISCONTINUED | OUTPATIENT
Start: 2024-05-14 | End: 2024-05-14

## 2024-05-14 NOTE — PHYSICAL THERAPY NOTE
Chart reviewed, pt with dopplers pending to r/o DVT. Will hold and follow as appropriate.     Negar Delgado, PT, DPT  05/14/24

## 2024-05-14 NOTE — PROGRESS NOTES
POD#4 s/p radial resection sarcoma left pelvis with resection of left internal iliac artery and vein    Nausea and vomiting overnight. Increased pain overnight controlled with PO dilaudid. Passing gas, no BM    Blood pressure 122/75, pulse 70, temperature 98.1 °F (36.7 °C), temperature source Oral, resp. rate 16, height 1.575 m (5' 2\"), weight 86.2 kg (190 lb), SpO2 95%.    Intake/Output Summary (Last 24 hours) at 5/14/2024 0732  Last data filed at 5/14/2024 0600  Gross per 24 hour   Intake 1603 ml   Output 1615 ml   Net -12 ml     Lab Results   Component Value Date    WBC 16.5 05/14/2024    HGB 10.7 05/14/2024    HCT 32.0 05/14/2024    .0 05/14/2024    CREATSERUM 0.54 05/14/2024    BUN 7 05/14/2024     05/14/2024    K 3.3 05/14/2024     05/14/2024    CO2 27.0 05/14/2024     05/14/2024    CA 9.7 05/14/2024     Constitutional:  NAD.   Eyes: non-icteric.    Abdomen: Soft, non-tender, mildly distended. Incision healing well without drainage or erythema. Covered with gauze and tape.   Musculoskeletal: no edema.     - No acute surgical issues  - WBC increase on Zosyn, nausea and vomiting overnight. CT A/P ordered. Contrast allergy, prednisone and benadryl ordered  - Continue soft diet  - Continue current pain regimen, tylenol q6hrs, scheduled dilaudid PO, dilaudid IV PRN, fentanyl patch  - CTM UOP  - Replace electrolytes per protocol  - Encourage ambulation and IS  - PT/OT  - VTE prophylaxis     JAIMIE Travis Dr

## 2024-05-14 NOTE — PLAN OF CARE
A/Ox4, 2L on NC, NSR  Encouraged use of IS, achieving 500  Belching (+), Flatulence (+).   Pain managed w/ schd Tylenol and dilaudid  Low fiber diet; not tolerating. Eating small portions   N/V present, No emesis; managed w/ PRNs  Urine goal met per Post-op surg/onc protocol  Midline incision w/gauze & tape; CDI  Comfort and safety measures maintained.  Patient, sister, and spouse all updated on Plan of care

## 2024-05-14 NOTE — PLAN OF CARE
Assumed patient care at 0730  Tele; NSR/ST   1.5L NC; unable to wean.  Encouraged use of IS; achieving 500-750  C/o 7-9/10 abdominal pain; some relief with alanna tylenol, PRN PO dilaudid, and IVP dilaudid  C/o nausea this am and this evening; dry heaving and small amounts of emesis; Reglan and compazine given.  Surg onc and hosp aware.  XR abd ordered this evening per hosp  Temp 99.2; diaphoretic; Mds aware.  IV abx as scheduled  Poor PO intake; had couple crackers and half a pudding  IVF infusing per order  K replaced per protocol  Adequate UO per surgical oncology protocol  Ambulating to bathroom with walker and standby assist  Sister at bedside    Plan of care discussed with patient, sister, and physicians.    Patient called back, relayed message as written. Patient verbalized understanding.

## 2024-05-14 NOTE — PLAN OF CARE
Assumed care @1930  A/Ox4, 2L on NC, NSR  BP elevated overnight; managed w/ PRNs  Pain managed w/ schd Tylenol and PRN meds.  Low fiber diet; not tolerating  Belching (+), Flatulence (+)  N/V present, x2 emesis ep; managed w/ PRNs  Urine goal met per surg onc protocol  Midline incision w/gauze & tape; CDI  Comfort and safety measures maintained.  Pt updated on POC

## 2024-05-14 NOTE — CM/SW NOTE
Chart reviewed, pt discussed in rounds for DC planning. Pt not yet medically cleared for DC. Reviewed  list, current accepting is [facility]. Updates sent, will continue to update for DC planning.     SW/CM to remain available for DC planning needs/recs.    JAMES Gonzalez

## 2024-05-14 NOTE — OCCUPATIONAL THERAPY NOTE
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  Patient reports she was walking 1 block for exercise.     ASSESSMENT   Patient demonstrates good  progress this session, goals remain in progress.    Patient continues to function near baseline with toileting, bed mobility, transfers, static standing balance, dynamic standing balance, and functional standing tolerance.   Contributing factors to remaining limitations include decreased functional strength, decreased endurance, pain, and decreased muscular endurance.  Next session anticipate patient to progress lower body dressing.  Occupational Therapy will continue to follow patient for duration of hospitalization.    Patient continues to benefit from continued skilled OT services: at discharge to promote functional independence in home.  Anticipate patient will return home with home health OT.          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 Co-Morbidities : HTN, hypothyroidism, VANDA, peripheral neuropathy, chronic bronchitis, hypokalemia,    WEIGHT BEARING RESTRICTION  Weight Bearing Restriction: None                Recommendations for nursing staff:   Transfers: 1 person  Toileting location: toilet    TREATMENT SESSION:  Patient Start of Session: semi supine in bed  FUNCTIONAL TRANSFER ASSESSMENT  Sit to Stand: Edge of Bed;  Chair  Edge of Bed: Supervision  Chair: Supervision  Toilet Transfer: Supervision    BED MOBILITY  Rolling: Supervision  Supine to Sit : Supervision  Sit to Supine (OT): Supervision  Scooting: Supervision    BALANCE ASSESSMENT  Static Sitting: Supervision  Static Standing: Supervision    FUNCTIONAL ADL ASSESSMENT  Eating: Not Tested  Grooming Seated: Not Tested  LB Dressing Seated: Not Tested  Toileting Seated: Supervision  Toileting Standing: Supervision      ACTIVITY TOLERANCE: WFL                         O2 SATURATIONS       EDUCATION PROVIDED  Patient: Role of Occupational Therapy; Plan of Care; Discharge Recommendations; Functional Transfer Techniques; Fall Prevention; Posture/Positioning; Energy Conservation; Proper Body Mechanics  Patient's Response to Education: Verbalized Understanding; Returned Demonstration  Family/Caregiver: Role of Occupational Therapy; Plan of Care; Discharge Recommendations  Family/Caregiver's Response to Education: Verbalized Understanding      Equipment used: RW  Demonstrates functional use       Therapist comments: Pt received semi supine in bed, pleasant and cooperative for OT session. Pt agreeable for OOB activity. Pt performs bed mobility at supervision with good awareness of her IV line management. Pt performs sit to stand transfer at supervision and engages in short distance functional mobility in preparation for standing based functional transfers and ADLs. Pt returns to room and requests to use the toilet. Toilet transfer with RW performed at supervision and pt performs all aspects of toileting at supervision. Pt returns to bed at supervision with all needs.  Patient End of Session: In bed;Needs met;Call light within reach;RN aware of session/findings;All patient questions and concerns addressed;Alarm set;Family present    SUBJECTIVE  \"I have pain all over.\"    PAIN ASSESSMENT  Ratin  Location: incision site  Management Techniques: Repositioning;Nurse notified (IV  continuous pain meds)     OBJECTIVE  Precautions: Abdominal protective strategies;Limb alert - right;Limb alert - left    AM-PAC ‘6-Clicks’ Inpatient Daily Activity Short Form  -   Putting on and taking off regular lower body clothing?: A Lot  -   Bathing (including washing, rinsing, drying)?: A Lot  -   Toileting, which includes using toilet, bedpan or urinal? : A Little  -   Putting on and taking off regular upper body clothing?: A Little  -   Taking care of personal grooming such as brushing teeth?: A Little  -   Eating meals?: A Little    AM-PAC Score:  Score: 16  Approx Degree of Impairment: 53.32%  Standardized Score (AM-PAC Scale): 35.96    PLAN  OT Treatment Plan: Balance activities;Energy conservation/work simplification techniques;ADL training;Functional transfer training;UE strengthening/ROM;Endurance training;Patient/Family education;Patient/Family training;Equipment eval/education;Compensatory technique education  Rehab Potential : Good  Frequency: 3-5x/week    OT Goals:     All goals ongoing 05/14    ADL Goals  Patient will perform toileting with supervision and AE PRN- goal met 05/14  Patient will perform LB dressing with supervision and AE PRN     Functional Transfer Goals  Patient will perform bed mobility supine to sit with supervision- goal met 50/14  Patient will perform bed mobility sit to supine with supervision- goal met 05/14  Patient will perform toilet transfer with supervision- goal met 05/14    OT Session Time: 15 minutes  Self-Care Home Management: 15 minutes

## 2024-05-15 LAB
ANION GAP SERPL CALC-SCNC: 6 MMOL/L (ref 0–18)
BUN BLD-MCNC: 5 MG/DL (ref 9–23)
CALCIUM BLD-MCNC: 10 MG/DL (ref 8.5–10.1)
CHLORIDE SERPL-SCNC: 105 MMOL/L (ref 98–112)
CO2 SERPL-SCNC: 25 MMOL/L (ref 21–32)
CREAT BLD-MCNC: 0.55 MG/DL
EGFRCR SERPLBLD CKD-EPI 2021: 104 ML/MIN/1.73M2 (ref 60–?)
ERYTHROCYTE [DISTWIDTH] IN BLOOD BY AUTOMATED COUNT: 12.1 %
GLUCOSE BLD-MCNC: 129 MG/DL (ref 70–99)
HCT VFR BLD AUTO: 33.3 %
HGB BLD-MCNC: 10.9 G/DL
MCH RBC QN AUTO: 30 PG (ref 26–34)
MCHC RBC AUTO-ENTMCNC: 32.7 G/DL (ref 31–37)
MCV RBC AUTO: 91.7 FL
OSMOLALITY SERPL CALC.SUM OF ELEC: 281 MOSM/KG (ref 275–295)
PLATELET # BLD AUTO: 323 10(3)UL (ref 150–450)
POTASSIUM SERPL-SCNC: 3.1 MMOL/L (ref 3.5–5.1)
POTASSIUM SERPL-SCNC: 3.1 MMOL/L (ref 3.5–5.1)
POTASSIUM SERPL-SCNC: 3.6 MMOL/L (ref 3.5–5.1)
RBC # BLD AUTO: 3.63 X10(6)UL
SODIUM SERPL-SCNC: 136 MMOL/L (ref 136–145)
WBC # BLD AUTO: 16 X10(3) UL (ref 4–11)

## 2024-05-15 PROCEDURE — 99232 SBSQ HOSP IP/OBS MODERATE 35: CPT | Performed by: INTERNAL MEDICINE

## 2024-05-15 RX ORDER — SIMETHICONE 80 MG
80 TABLET,CHEWABLE ORAL 4 TIMES DAILY PRN
Status: DISCONTINUED | OUTPATIENT
Start: 2024-05-15 | End: 2024-05-16

## 2024-05-15 RX ORDER — AMOXICILLIN AND CLAVULANATE POTASSIUM 875; 125 MG/1; MG/1
1 TABLET, FILM COATED ORAL 2 TIMES DAILY
Qty: 8 TABLET | Refills: 0 | Status: SHIPPED | OUTPATIENT
Start: 2024-05-15 | End: 2024-05-19

## 2024-05-15 RX ORDER — AMLODIPINE BESYLATE 10 MG/1
10 TABLET ORAL DAILY
Status: DISCONTINUED | OUTPATIENT
Start: 2024-05-16 | End: 2024-05-15

## 2024-05-15 RX ORDER — AMLODIPINE BESYLATE 5 MG/1
5 TABLET ORAL DAILY
Status: DISCONTINUED | OUTPATIENT
Start: 2024-05-16 | End: 2024-05-16

## 2024-05-15 RX ORDER — POTASSIUM CHLORIDE 20 MEQ/1
40 TABLET, EXTENDED RELEASE ORAL EVERY 4 HOURS
Status: COMPLETED | OUTPATIENT
Start: 2024-05-15 | End: 2024-05-15

## 2024-05-15 RX ORDER — AMLODIPINE BESYLATE 2.5 MG/1
2.5 TABLET ORAL NIGHTLY
Status: DISCONTINUED | OUTPATIENT
Start: 2024-05-15 | End: 2024-05-16

## 2024-05-15 RX ORDER — LEVOTHYROXINE SODIUM 175 UG/1
175 TABLET ORAL
Status: SHIPPED | COMMUNITY
Start: 2024-05-15

## 2024-05-15 RX ORDER — AMLODIPINE BESYLATE 5 MG/1
5 TABLET ORAL NIGHTLY
Status: DISCONTINUED | OUTPATIENT
Start: 2024-05-15 | End: 2024-05-15

## 2024-05-15 NOTE — PAYOR COMM NOTE
--------------  CONTINUED STAY REVIEW    Payor: ARIEL ORNELAS POS/MCNP  Subscriber #:  [id number]  Authorization Number: [id number]    Admit date: 5/10/24  Admit time:  5:37 AM    REVIEW DOCUMENTATION:  5/14  Surgical Oncology Progress Notes         POD#4 s/p radial resection sarcoma left pelvis with resection of left internal iliac artery and vein   No acute surgical issues  - WBC increase on Zosyn, nausea and vomiting overnight. CT A/P ordered. Contrast allergy, prednisone and benadryl ordered  - Continue soft diet  - Continue current pain regimen, tylenol q6hrs, scheduled dilaudid PO, dilaudid IV PRN, fentanyl patch  - CTM UOP  - Replace electrolytes per protocol  - Encourage ambulation and IS  - PT/OT  - VTE prophylaxis   Hospitalist Progress Note   Assessment & Plan:  #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  #Leiomyosarcoma   Surgery on 5/10  Pain control, Anti-emetics  Soft diet, advance per surgery  Lovenox, IS, ambulate  CT abd pelv today  Post op care per surgery     #Sepsis due to Pneumonia  Fever, tachycardia, WBC 14.9 -> 16.5  CXR showed right middle lobe consolidation   Zosyn started  F/u blood cultures - NGTD     #Post op n/v  Continue prn compazine, reglan   CT abd/pelv today     #Postop urinary retention  Montor bladder scans, may need dennis insertion   U/a clear     #Essential HTN - amlodipine, losartan   #Hypothyroidism - Synthroid   #VANDA - Duloxetine   #Peripheral neuropathy - Gabapentin  #S/p Spine stimulator in place   #Chronic bronchitis - Breo, prn albuterol   #Hypokalemia - replete and trend, K 3.3  #Post op anemia:  hg 10.7, trend hg        MEDICATIONS ADMINISTERED IN LAST 1 DAY:  acetaminophen (Tylenol Extra Strength) tab 1,000 mg       Date Action Dose Route User    5/15/2024 0626 Given 1,000 mg Oral Dottie Reardon RN    5/15/2024 0014 Given 1,000 mg Oral Dottie Reardon RN    5/14/2024 2021 Given 1,000 mg Oral Dottie Reardon RN    5/14/2024 1311 Given  1,000 mg Oral Rajni Ibarra RN          amLODIPine (Norvasc) tab 2.5 mg       Date Action Dose Route User    5/14/2024 2020 Given 2.5 mg Oral Dottie Reardon RN          famotidine (Pepcid) tab 20 mg       Date Action Dose Route User    5/14/2024 2021 Given 20 mg Oral Dottie Reardon RN          fluticasone furoate-vilanterol (Breo Ellipta) 200-25 MCG/ACT inhaler 1 puff       Date Action Dose Route User    5/14/2024 0920 Given 1 puff Inhalation Rajni Ibarra RN          gabapentin (Neurontin) cap 300 mg       Date Action Dose Route User    5/14/2024 2021 Given 300 mg Oral Dottie Reardon RN    5/14/2024 1718 Given 300 mg Oral Rajni Ibarra RN          hydrALAzine (Apresoline) 20 mg/mL injection 5 mg       Date Action Dose Route User    5/15/2024 0026 Given 5 mg Intravenous Dottie Reardon RN          HYDROmorphone (Dilaudid) 1 MG/ML injection 1 mg       Date Action Dose Route User    5/15/2024 0350 Given 1 mg Intravenous IsRosalia stephenson RN    5/15/2024 0122 Given 1 mg Intravenous IsRosalia stephenson RN          HYDROmorphone (Dilaudid) tab 4 mg       Date Action Dose Route User    5/15/2024 0518 Given 4 mg Oral Dottie Reardon RN    5/15/2024 0013 Given 4 mg Oral Dottie Reardon RN    5/14/2024 2020 Given 4 mg Oral Dottie Reardon RN    5/14/2024 1718 Given 4 mg Oral Rajni Ibarra RN    5/14/2024 1310 Given 4 mg Oral Rajni Ibarra RN          labetalol (Trandate) 5 mg/mL injection 5 mg       Date Action Dose Route User    5/15/2024 0524 Given 5 mg Intravenous Dottie Reardon RN    5/15/2024 0122 Given 5 mg Intravenous IsmaRosalia dalton RN          metoclopramide (Reglan) 5 mg/mL injection 10 mg       Date Action Dose Route User    5/15/2024 0026 Given 10 mg Intravenous Dottie Reardon RN          piperacillin-tazobactam (Zosyn) 3.375 g in dextrose 5% 100 mL IVPB-ADDV       Date Action Dose Route User    5/15/2024 0014 New Bag 3.375 g Intravenous Dottie Reardon, DEO    5/14/2024 7291 New Bag  3.375 g Intravenous Nzoutap, Eurick, RN          potassium chloride (Klor-Con M20) tab 40 mEq       Date Action Dose Route User    5/14/2024 2020 Given 40 mEq Oral Dottie Reardon RN    5/14/2024 1151 Given 40 mEq Oral Rajni Ibarra RN          prochlorperazine (Compazine) 10 MG/2ML injection 5 mg       Date Action Dose Route User    5/15/2024 0350 Given 5 mg Intravenous Rosalia Kwong RN    5/14/2024 1332 Given 5 mg Intravenous Rajni Ibarra RN          simethicone (Mylicon) chewable tab 80 mg       Date Action Dose Route User    5/15/2024 0350 Given 80 mg Oral Rosalia Kwong RN          levothyroxine (Synthroid) tab 175 mcg       Date Action Dose Route User    5/15/2024 0518 Given 175 mcg Oral Dottie Reardon RN            Vitals (last day)       Date/Time Temp Pulse Resp BP SpO2 Weight O2 Device O2 Flow Rate (L/min) [facility]    05/15/24 0450 99.1 °F (37.3 °C) 97 20 197/88 93 % -- Nasal cannula 1.5 L/min     05/15/24 0358 -- -- -- -- 93 % -- Nasal cannula 1.5 L/min     05/15/24 0200 -- -- -- 180/90 -- -- -- -- AB    05/15/24 0115 -- 98 -- 198/90 -- -- -- -- AB    05/14/24 2350 99.2 °F (37.3 °C) 101 18 203/96 95 % -- Nasal cannula 1.5 L/min     05/14/24 2015 99.1 °F (37.3 °C) 88 18 189/91 94 % -- Nasal cannula 1.5 L/min     05/14/24 1646 98.2 °F (36.8 °C) 90 15 194/89 93 % -- -- 1.5 L/min CM    05/14/24 1646 -- -- -- -- -- -- Nasal cannula -- CR    05/14/24 1200 98.4 °F (36.9 °C) 96 16 158/88 95 % -- -- 1.5 L/min CM    05/14/24 1200 -- -- -- -- -- -- Nasal cannula -- CR    05/14/24 0845 99 °F (37.2 °C) 106 16 154/114 95 % -- -- 1.5 L/min CM    05/14/24 0845 -- -- -- -- -- -- Nasal cannula -- CR    05/14/24 0757 -- -- -- -- 94 % -- Nasal cannula 1.5 L/min KH    05/14/24 0430 98.1 °F (36.7 °C) 70 16 122/75 95 % -- Nasal cannula 1.5 L/min JL    05/14/24 0040 98.7 °F (37.1 °C) 81 11 150/72 94 % -- Nasal cannula 1.5 L/min JL          CIWA Scores (since admission)       None            PLEASE FAX DAYS  CERTIFIED AND NEXT REVIEW DATE -[phone]

## 2024-05-15 NOTE — PHYSICAL THERAPY NOTE
PHYSICAL THERAPY TREATMENT NOTE - INPATIENT    Room Number: 7623/7623-A     Session: 1     Number of Visits to Meet Established Goals: 5    Presenting Problem: 63 y.o s/p day 1  of radical resection sarcoma left pelvis with resection of left internal iliac artery and vein. Exclusion of small bowel with omentum. Presenting with abdominal pain, nausea  Co-Morbidities : HTN, hypothyroidism, VANDA, peripheral neuropathy, chronic bronchitis, hypokalemia,      PHYSICAL THERAPY MEDICAL/SOCIAL HISTORY  History related to current admission: Patient is a 63 year old female admitted on 5/10/2024  for s/p 5.10.24 radical resection sarcoma of L pelvis, L internal iliac artery and vein, and small bowel with omentum. She is s/p day 1 of surgery and currently presents with abdominal pain and nausea.      HOME SITUATION  Type of Home: House   Home Layout: Two level  Stairs to Enter : 2  Railing: Yes  Stairs to Bedroom: 12  Railing: Yes     Lives With: Spouse;Daughter;Friend(s) (grandson 9 21 y.o0)  Drives: No  Patient Owned Equipment:  (\" Child buggy\")  Patient Regularly Uses: None     Prior Level of [place]: Prior to hospitalization she was able to ascend and descend her stairs with bilateral railings, cook, and clean independently. She has a shower chair that she uses at time but is able to perform hygiene activities independently.       ASSESSMENT   Patient demonstrates good  progress this session, goals   met .    Patient is currently functioning near baseline with bed mobility, transfers, and gait.    Patient currently does not meet criteria for skilled inpatient physical therapy services, however patient will continue to benefit from QID ambulation with nursing staff.  Pt is discharged from IP PT services.  RN aware to re-order if there is a decline in mobility status.      Patient continues to benefit from continued skilled PT services: at discharge to promote functional independence and safety with additional support  and return home with home health PT.    PLAN  PT Treatment Plan: Bed mobility;Body mechanics;Coordination;Endurance;Energy conservation;Gait training;Strengthening;Stair training  Rehab Potential : Good  Frequency (Obs): 3-5x/week    CURRENT GOALS     Goal #1 Patient is able to demonstrate supine - sit EOB @ level: independent  MET   Goal #2 Patient is able to demonstrate transfers EOB to/from [facility] at assistance level: independent  MET   Goal #3 Patient is able to ambulate 150 feet with assist device: none at assistance level: independent  [facility]    Goal #4    Goal #5    Goal #6    Goal Comments: Goals established on 5/11/2024    SUBJECTIVE  \"Can I have the oxygen back on?\"  Reports she'll be staying on main floor of her house for the first few weeks    OBJECTIVE  Precautions: Abdominal protective strategies;Limb alert - right;Limb alert - left    WEIGHT BEARING RESTRICTION  Weight Bearing Restriction: None                PAIN ASSESSMENT   Rating: Unable to rate  Location: RLE  Management Techniques: Activity promotion    BALANCE                                                                                                                       Static Sitting: Good  Dynamic Sitting: Fair +           Static Standing: Fair -  Dynamic Standing: Poor +    ACTIVITY TOLERANCE                         O2 WALK  Oxygen Therapy  SPO2% on Room Air at Rest: 89  SPO2% on Oxygen at Rest: 96  At rest oxygen flow (liters per minute): 2  SPO2% Ambulation on Oxygen: 93  Ambulation oxygen flow (liters per minute): 1      AM-PAC '6-Clicks' INPATIENT SHORT FORM - BASIC MOBILITY  How much difficulty does the patient currently have...  Patient Difficulty: Turning over in bed (including adjusting bedclothes, sheets and blankets)?: None   Patient Difficulty: Sitting down on and standing up from a chair with arms (e.g., wheelchair, bedside commode, etc.): None   Patient Difficulty: Moving from lying on back to sitting on the  side of the bed?: None   How much help from another person does the patient currently need...   Help from Another: Moving to and from a bed to a chair (including a wheelchair)?: A Little   Help from Another: Need to walk in hospital room?: A Little   Help from Another: Climbing 3-5 steps with a railing?: A Little       AM-PAC Score:  Raw Score: 21   Approx Degree of Impairment: 28.97%   Standardized Score (AM-PAC Scale): 50.25   CMS Modifier (G-Code): CJ    FUNCTIONAL ABILITY STATUS  Gait Assessment   Functional Mobility/Gait Assessment  Gait Assistance: Modified independent;Supervision  Distance (ft): 250  Assistive Device: Rolling walker  Pattern:  (dec gait speed, mildly guarded)    Skilled Therapy Provided    Bed Mobility:  Rolling: NT   Supine<>Sit: ind   Sit<>Supine: NT     Transfer Mobility:  Sit<>Stand: ind   Stand<>Sit: ind   Gait: supervision-mod I     Therapist's Comments: RN cleared for session. Pt agreeable for therapy, received supine. Pt ambulates with steady gait, good use of RW. RW height adjusted for optimal gait mechanics. Pt instructed on continued use of RW for optimal balance and energy conservation. Pt slightly dizzy at end of session. Instructed to call for nursing staff for any needs and OOB mobility.       Patient End of Session: Up in chair;Needs met;Call light within reach;RN aware of session/findings;All patient questions and concerns addressed;Alarm set;Family present    PT Session Time: 30 minutes  Gait Training: 15 minutes

## 2024-05-15 NOTE — PROGRESS NOTES
POD#5 s/p radial resection sarcoma left pelvis with resection of left internal iliac artery and vein    Improved nausea and vomiting. Has abdominal pain when moving out of bed, improved with addition of simethicone. Walks with PT. + flatus, no BM.     Blood pressure (!) 197/88, pulse 97, temperature 99.1 °F (37.3 °C), temperature source Oral, resp. rate 20, height 1.575 m (5' 2\"), weight 86.2 kg (190 lb), SpO2 93%.    Intake/Output Summary (Last 24 hours) at 5/15/2024 0938  Last data filed at 5/15/2024 0630  Gross per 24 hour   Intake 300 ml   Output 2650 ml   Net -2350 ml     Lab Results   Component Value Date    WBC 16.0 05/15/2024    HGB 10.9 05/15/2024    HCT 33.3 05/15/2024    .0 05/15/2024    CREATSERUM 0.55 05/15/2024    BUN 5 05/15/2024     05/15/2024    K 3.1 05/15/2024    K 3.1 05/15/2024     05/15/2024    CO2 25.0 05/15/2024     05/15/2024    CA 10.0 05/15/2024     Constitutional:  NAD.   Eyes: non-icteric.    Abdomen: Soft, non-tender, mildly distended. Incision healing well without drainage or erythema.   Musculoskeletal: no edema.     - No acute surgical issues  - CT A/P limited without contrast with post op changes. Bilateral doppler negative for DVT. WBC slightly down from yesterday.   - Continue soft diet  - Continue current pain regimen, tylenol q6hrs, scheduled dilaudid PO, dilaudid IV PRN, fentanyl patch  - Appropriate UOP, discontinue IVF  - Replace electrolytes per protocol  - Encourage ambulation and IS  - PT/OT  - VTE prophylaxis     JAIMIE Fry    Addendum: IVON Mary, can initiate discharge planning

## 2024-05-15 NOTE — PLAN OF CARE
A/Ox4, 2L on NC, NSR  Encouraged use of IS  Belching (+), Flatulence (+).  Pain managed w/schd Tylenol and dilaudid  Reporting abd discomfort d/t gas; managed w/ prn medication; MD aware.  Low fiber diet; not tolerating. Eating small portions  N/V present, No emesis; managed w/ PRNs  Urine goal met per surg/onc protocol  Midline incision w/gauze & tape; CDI  IV abx infusing per order  Comfort and safety measures maintained  Pt updated on plan of care

## 2024-05-15 NOTE — PROGRESS NOTES
[facility]   part of [facility]     Hospitalist Progress Note     Citlali Leung Patient Status:  Inpatient    1960 MRN [id number]   Location [facility] 7NE-A Attending Christian Mary MD   Hosp Day # 5 PCP Janel Montenegrokwube     Chief Complaint: Abdominal pain    Subjective:     Patient without acute events overnight. Pain better controlled. Ambulating the halls. N/V improving. Pain only present with ambulation. No F/C. No BM yet.     Objective:    Review of Systems:   A comprehensive review of systems was completed; pertinent positive and negatives stated in subjective.    Vital signs:  Temp:  [97.2 °F (36.2 °C)-99.2 °F (37.3 °C)] 97.2 °F (36.2 °C)  Pulse:  [] 87  Resp:  [15-20] 18  BP: (158-203)/(88-96) 172/90  SpO2:  [93 %-96 %] 96 %    Physical Exam:    General: No acute distress, pleasant   Respiratory: Course BS  Cardiovascular: S1, S2, regular rate and rhythm  Abdomen: Soft, mild tenderness, non-distended, iabd binder in place  Neuro: No new focal deficits.   Extremities: No edema    Diagnostic Data:    Labs:  Recent Labs   Lab 24  0649 248 24  0526 05/15/24  0612   WBC 16.0* 17.3* 14.9* 16.5* 16.0*   HGB 12.8 11.1* 10.5* 10.7* 10.9*   MCV 89.0 95.5 96.8 91.4 91.7   .0 245.0 239.0 260.0 323.0       Recent Labs   Lab 24  0649 24  1615 24  0328 24  0433 24  0526 05/15/24  0612   *  --  112* 109* 109* 129*   BUN 5*  --  10 9 7* 5*   CREATSERUM 0.63  --  0.66 0.44* 0.54* 0.55   CA 9.2  --  10.1 9.9 9.7 10.0   ALB 2.8*  --  2.7* 2.6*  --   --      --  141 142 140 136   K 3.3*   < > 4.3  4.3 3.7 3.3* 3.1*  3.1*     --  113* 112 107 105   CO2 24.0  --  26.0 24.0 27.0 25.0   ALKPHO 72  --  71 84  --   --    AST 45*  --  47* 46*  --   --    ALT 24  --  24 24  --   --    BILT 0.5  --  0.5 0.6  --   --    TP 6.2*  --  6.3* 6.5  --   --     < > = values in this interval not displayed.        Estimated Creatinine Clearance: 85 mL/min (based on SCr of 0.55 mg/dL).    No results for input(s): \"TROP\", \"TROPHS\", \"CK\" in the last 168 hours.    No results for input(s): \"PTP\", \"INR\" in the last 168 hours.               Microbiology    Hospital Encounter on 05/10/24   1. Blood Culture     Status: None (Preliminary result)    Collection Time: 05/12/24  6:15 PM    Specimen: Blood,peripheral   Result Value Ref Range    Blood Culture Result No Growth 2 Days N/A         Imaging: Reviewed in Epic.    Medications:    potassium chloride  40 mEq Oral Q4H    HYDROmorphone  4 mg Oral q4h    potassium chloride  40 mEq Oral BID    acetaminophen  1,000 mg Oral q6h    piperacillin-tazobactam  3.375 g Intravenous Q8H ADELIA    enoxaparin  40 mg Subcutaneous Daily    famotidine  20 mg Oral BID    Or    famotidine  20 mg Intravenous BID    fluticasone furoate-vilanterol  1 puff Inhalation Daily    DULoxetine  60 mg Oral Daily    gabapentin  300 mg Oral TID    levothyroxine  175 mcg Oral Before breakfast    amLODIPine  5 mg Oral Daily    amLODIPine  2.5 mg Oral Nightly    losartan  50 mg Oral Daily    fentaNYL  1 patch Transdermal Q72H    fentaNYL  1 patch Transdermal Q72H       Assessment & Plan:      #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  #Leiomyosarcoma   Surgery on 5/10  Pain control, Anti-emetics  Soft diet, advance per surgery  Lovenox, IS, ambulate  Post op care per surgery    #Sepsis due to Pneumonia  Fever, tachycardia, WBC elevated  CXR showed right middle lobe consolidation   Zosyn  F/u blood cultures - NGTD    #Post op n/v  Continue prn compazine, reglan   improved    #Postop urinary retention  Montor bladder scans, may need dennis insertion   U/a clear    #Essential HTN - amlodipine, losartan   #Hypothyroidism - Synthroid   #VANDA - Duloxetine   #Peripheral neuropathy - Gabapentin  #S/p Spine stimulator in place   #Chronic bronchitis - Breo, prn albuterol   #Hypokalemia - replete and  trend, K 3.3  #Post op anemia:  hg stable    Blane Henderson MD      Supplementary Documentation:     Quality:  DVT Mechanical Prophylaxis:   SCDs, Early ambuation  DVT Pharmacologic Prophylaxis   Medication    enoxaparin (Lovenox) 40 MG/0.4ML SUBQ injection 40 mg                Code Status: Not on file  Aquino: No urinary catheter in place  Aquino Duration (in days): 2  Central line:    DVA:     Discharge is dependent on: surgical recovery   At this point Ms. Leung is expected to be discharge to: Home    The 21st Century Cures Act makes medical notes like these available to patients in the interest of transparency. Please be advised this is a medical document. Medical documents are intended to carry relevant information, facts as evident, and the clinical opinion of the practitioner. The medical note is intended as peer to peer communication and may appear blunt or direct. It is written in medical language and may contain abbreviations or verbiage that are unfamiliar.                    N/A

## 2024-05-15 NOTE — CM/SW NOTE
Pt discussed with PA and RN for DC planning. Surgical Oncology DC planning. Per RN, family requesting SNF stay. SW met with pt and sister at bedside. Notified that skilled rehab is not recommended and will likely not be approved by insurance due to lack of skilled need. Sister shared reasoning behind request, discussed alternative on discharging to another family members home. Pt has stayed with her (89 year old) mom in the past and is able to go there at DC. Discussed importance of activity.     DANIELA sent message to PT/OT-- pt able to ambulate halls without staff. RN and PA aware. Encouragement needed/provided. Pt currently on O2, will need O2 walk. Pt aware oxygen saturations will need to be < 88% in order to qualify.     DC planning for Thursday to pt mom's home. Address: [facility] [address] [place] [address]. [facility] able to service area. Made aware of DC tomorrow.     JAMES Gonzalez

## 2024-05-15 NOTE — PAYOR COMM NOTE
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 Has abdominal pain when moving out of bed, improved with addition of simethicone. Walks with PT. + flatus, no BM.      Blood pressure (!) 197/88, pulse 97, temperature 99.1 °F (37.3 °C), temperature source Oral, resp. rate 20, height 1.575 m (5' 2\"), weight 86.2 kg (190 lb), SpO2 93%.     Intake/Output Summary (Last 24 hours) at 5/15/2024 0938  Last data filed at 5/15/2024 0630      Gross per 24 hour   Intake 300 ml   Output 2650 ml   Net -2350 ml            Lab Results   Component Value Date     WBC 16.0 05/15/2024     HGB 10.9 05/15/2024     HCT 33.3 05/15/2024     .0 05/15/2024     CREATSERUM 0.55 05/15/2024     BUN 5 05/15/2024      05/15/2024     K 3.1 05/15/2024     K 3.1 05/15/2024      05/15/2024     CO2 25.0 05/15/2024      05/15/2024     CA 10.0 05/15/2024      Constitutional:  NAD.   Eyes: non-icteric.    Abdomen: Soft, non-tender, mildly distended. Incision healing well without drainage or erythema.   Musculoskeletal: no edema.   No acute surgical issues  - CT A/P limited without contrast with post op changes. Bilateral doppler negative for DVT. WBC slightly down from yesterday.   - Continue soft diet  - Continue current pain regimen, tylenol q6hrs, scheduled dilaudid PO, dilaudid IV PRN, fentanyl patch  - Appropriate UOP, discontinue IVF  - Replace electrolytes per protocol  - Encourage ambulation and IS  - PT/OT  - VTE prophylaxis        MEDICATIONS ADMINISTERED IN LAST 1 DAY:  acetaminophen (Tylenol Extra Strength) tab 1,000 mg       Date Action Dose Route User    5/15/2024 0626 Given 1,000 mg Oral  Dottie Reardon RN    5/15/2024 0014 Given 1,000 mg Oral Dottie Reardon RN    5/14/2024 2021 Given 1,000 mg Oral Dottie Reardon RN          amLODIPine (Norvasc) tab 2.5 mg       Date Action Dose Route User    5/14/2024 2020 Given 2.5 mg Oral Dottie Reardon RN          amLODIPine (Norvasc) tab 5 mg       Date Action Dose Route User    5/15/2024 0937 Given 5 mg Oral Rajni Ibarra RN          DULoxetine (Cymbalta) DR cap 60 mg       Date Action Dose Route User    5/15/2024 0937 Given 60 mg Oral Rajni Ibarra RN          enoxaparin (Lovenox) 40 MG/0.4ML SUBQ injection 40 mg       Date Action Dose Route User    5/15/2024 0935 Given 40 mg Subcutaneous (Right Lower Abdomen) Rajni Ibarra RN          famotidine (Pepcid) tab 20 mg       Date Action Dose Route User    5/15/2024 0937 Given 20 mg Oral Rajni Ibarra RN    5/14/2024 2021 Given 20 mg Oral Dottie Reardon RN          fluticasone furoate-vilanterol (Breo Ellipta) 200-25 MCG/ACT inhaler 1 puff       Date Action Dose Route User    5/15/2024 0938 Given 1 puff Inhalation Rajni Ibarra RN          gabapentin (Neurontin) cap 300 mg       Date Action Dose Route User    5/15/2024 0936 Given 300 mg Oral Rajni Ibarra RN    5/14/2024 2021 Given 300 mg Oral Dottie Reardon RN    5/14/2024 1718 Given 300 mg Oral Rajni Ibarra RN          hydrALAzine (Apresoline) 20 mg/mL injection 5 mg       Date Action Dose Route User    5/15/2024 0026 Given 5 mg Intravenous Dottie Reardon RN          HYDROmorphone (Dilaudid) 1 MG/ML injection 1 mg       Date Action Dose Route User    5/15/2024 0350 Given 1 mg Intravenous Rosalia Kwong RN    5/15/2024 0122 Given 1 mg Intravenous IsRosalia stephenson RN          HYDROmorphone (Dilaudid) tab 4 mg       Date Action Dose Route User    5/15/2024 0936 Given 4 mg Oral NzoutRajni herrera RN    5/15/2024 0518 Given 4 mg Oral BridgesDottie RN    5/15/2024 0013 Given 4 mg Oral Bridges, DEO Sinclair    5/14/2024 2020  Given 4 mg Oral Dottie Reardon RN    5/14/2024 1718 Given 4 mg Oral Rajni Ibarra RN          labetalol (Trandate) 5 mg/mL injection 5 mg       Date Action Dose Route User    5/15/2024 0524 Given 5 mg Intravenous Dottie Reardon RN    5/15/2024 0122 Given 5 mg Intravenous IsRosalia stephenson RN          losartan (Cozaar) tab 50 mg       Date Action Dose Route User    5/15/2024 0937 Given 50 mg Oral Rajni Ibarra RN          metoclopramide (Reglan) 5 mg/mL injection 10 mg       Date Action Dose Route User    5/15/2024 0026 Given 10 mg Intravenous Dottie Reardon RN          piperacillin-tazobactam (Zosyn) 3.375 g in dextrose 5% 100 mL IVPB-ADDV       Date Action Dose Route User    5/15/2024 0935 New Bag 3.375 g Intravenous Rajni Ibarra RN    5/15/2024 0014 New Bag 3.375 g Intravenous Dottie Reardon RN    5/14/2024 1717 New Bag 3.375 g Intravenous Nzoutap, Eurick, RN          potassium chloride (Klor-Con M20) tab 40 mEq       Date Action Dose Route User    5/14/2024 2020 Given 40 mEq Oral Dottie Reardon RN          potassium chloride (Klor-Con M20) tab 40 mEq       Date Action Dose Route User    5/15/2024 0936 Given 40 mEq Oral Rajni Ibarra RN          prochlorperazine (Compazine) 10 MG/2ML injection 5 mg       Date Action Dose Route User    5/15/2024 1053 Given 5 mg Intravenous Rajni Ibarra RN    5/15/2024 0350 Given 5 mg Intravenous Rosalia Kwong RN    5/14/2024 1332 Given 5 mg Intravenous Rajni Ibarra RN          simethicone (Mylicon) chewable tab 80 mg       Date Action Dose Route User    5/15/2024 1006 Given 80 mg Oral Rajni Ibarra RN    5/15/2024 0350 Given 80 mg Oral Rosalia Kwong RN          levothyroxine (Synthroid) tab 175 mcg       Date Action Dose Route User    5/15/2024 0518 Given 175 mcg Oral Bridges, Dottie, RN            Vitals (last day)       Date/Time Temp Pulse Resp BP SpO2 Weight O2 Device O2 Flow Rate (L/min) Who    05/15/24 1200 -- 88 18 116/82 97 % -- Nasal  cannula 1.5 L/min     05/15/24 0800 97.2 °F (36.2 °C) 87 18 -- 96 % -- Nasal cannula 1.5 L/min CM    05/15/24 0800 -- -- -- 172/90 -- -- -- --     05/15/24 0450 99.1 °F (37.3 °C) 97 20 197/88 93 % -- Nasal cannula 1.5 L/min     05/15/24 0358 -- -- -- -- 93 % -- Nasal cannula 1.5 L/min     05/15/24 0200 -- -- -- 180/90 -- -- -- -- AB    05/15/24 0115 -- 98 -- 198/90 -- -- -- -- AB    05/14/24 2350 99.2 °F (37.3 °C) 101 18 203/96 95 % -- Nasal cannula 1.5 L/min     05/14/24 2015 99.1 °F (37.3 °C) 88 18 189/91 94 % -- Nasal cannula 1.5 L/min     05/14/24 1646 98.2 °F (36.8 °C) 90 15 194/89 93 % -- -- 1.5 L/min     05/14/24 1646 -- -- -- -- -- -- Nasal cannula -- CR    05/14/24 1200 98.4 °F (36.9 °C) 96 16 158/88 95 % -- -- 1.5 L/min CM    05/14/24 1200 -- -- -- -- -- -- Nasal cannula -- CR    05/14/24 0845 99 °F (37.2 °C) 106 16 154/114 95 % -- -- 1.5 L/min CM    05/14/24 0845 -- -- -- -- -- -- Nasal cannula -- CR    05/14/24 0757 -- -- -- -- 94 % -- Nasal cannula 1.5 L/min     05/14/24 0430 98.1 °F (36.7 °C) 70 16 122/75 95 % -- Nasal cannula 1.5 L/min     05/14/24 0040 98.7 °F (37.1 °C) 81 11 150/72 94 % -- Nasal cannula 1.5 L/min           CIWA Scores (since admission)       None          PLEASE FAX DAYS CERTIFIED AND NEXT REVIEW DATE [phone]

## 2024-05-15 NOTE — PLAN OF CARE
A/Ox4, 2L on NC, NSR  IVF discontinued. Abx to continue  Encouraged use of IS, achieving 500  Pain managed w/ schd Tylenol and dilaudid  Low fiber diet. Eating small portions at times  N/V present, No emesis; managed w/ PRNs  Urine goal met per Post-op surg/onc protocol  Midline incision w/gauze & tape; CDI  Comfort and safety measures maintained.  Seen by PT this afternoon. O2 walk completed  Patient and sister updated on Plan of care and discharge plan

## 2024-05-16 VITALS
TEMPERATURE: 98 F | OXYGEN SATURATION: 95 % | HEART RATE: 87 BPM | HEIGHT: 62 IN | SYSTOLIC BLOOD PRESSURE: 193 MMHG | BODY MASS INDEX: 34.89 KG/M2 | WEIGHT: 189.63 LBS | RESPIRATION RATE: 18 BRPM | DIASTOLIC BLOOD PRESSURE: 89 MMHG

## 2024-05-16 LAB
ANION GAP SERPL CALC-SCNC: 5 MMOL/L (ref 0–18)
BUN BLD-MCNC: 7 MG/DL (ref 9–23)
CALCIUM BLD-MCNC: 9.8 MG/DL (ref 8.5–10.1)
CHLORIDE SERPL-SCNC: 106 MMOL/L (ref 98–112)
CO2 SERPL-SCNC: 27 MMOL/L (ref 21–32)
CREAT BLD-MCNC: 0.56 MG/DL
EGFRCR SERPLBLD CKD-EPI 2021: 104 ML/MIN/1.73M2 (ref 60–?)
ERYTHROCYTE [DISTWIDTH] IN BLOOD BY AUTOMATED COUNT: 12.5 %
GLUCOSE BLD-MCNC: 120 MG/DL (ref 70–99)
HCT VFR BLD AUTO: 32.8 %
HGB BLD-MCNC: 10.8 G/DL
MCH RBC QN AUTO: 30.2 PG (ref 26–34)
MCHC RBC AUTO-ENTMCNC: 32.9 G/DL (ref 31–37)
MCV RBC AUTO: 91.6 FL
OSMOLALITY SERPL CALC.SUM OF ELEC: 285 MOSM/KG (ref 275–295)
PLATELET # BLD AUTO: 299 10(3)UL (ref 150–450)
POTASSIUM SERPL-SCNC: 3.6 MMOL/L (ref 3.5–5.1)
RBC # BLD AUTO: 3.58 X10(6)UL
SODIUM SERPL-SCNC: 138 MMOL/L (ref 136–145)
WBC # BLD AUTO: 11.3 X10(3) UL (ref 4–11)

## 2024-05-16 PROCEDURE — 99232 SBSQ HOSP IP/OBS MODERATE 35: CPT | Performed by: INTERNAL MEDICINE

## 2024-05-16 RX ORDER — ENOXAPARIN SODIUM 100 MG/ML
40 INJECTION SUBCUTANEOUS DAILY
Qty: 22 EACH | Refills: 0 | Status: SHIPPED | OUTPATIENT
Start: 2024-05-16

## 2024-05-16 RX ORDER — POTASSIUM CHLORIDE 20 MEQ/1
40 TABLET, EXTENDED RELEASE ORAL EVERY 4 HOURS
Status: DISCONTINUED | OUTPATIENT
Start: 2024-05-16 | End: 2024-05-16

## 2024-05-16 NOTE — CM/SW NOTE
O2 walk completed, pt saturations >88% therefore not qualifying. Notified surgonc PA. SW notified [facility]. Will send DC summary/AVS once available.    JAMES Gonzalez

## 2024-05-16 NOTE — PROGRESS NOTES
05/16/24 1100   Mobility   O2 walk? Yes   SPO2% on Room Air at Rest 99   SPO2% Ambulation on Room Air 98

## 2024-05-16 NOTE — OCCUPATIONAL THERAPY NOTE
IP OT attempted. Pt with no further OT concerns. Pt reports that she will be discharging within the next hour or 2. OT will sign off.

## 2024-05-16 NOTE — PROGRESS NOTES
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 Patient feels tired in the morning but \"usually snaps out of it\". No nausea, vomiting or bloating. States she is ready to go home today.     Blood pressure (!) 174/84, pulse 71, temperature 98.4 °F (36.9 °C), temperature source Oral, resp. rate 18, height 1.575 m (5' 2\"), weight 86 kg (189 lb 9.6 oz), SpO2 90%.    Intake/Output Summary (Last 24 hours) at 5/16/2024 0852  Last data filed at 5/16/2024 0320  Gross per 24 hour   Intake 560 ml   Output 500 ml   Net 60 ml     Lab Results   Component Value Date    K 3.6 05/15/2024     Constitutional:  NAD.   Eyes: non-icteric.    Abdomen: Soft, non-tender, non-distended. Incision healing well without drainage or erythema.   Musculoskeletal: no edema.     - No acute surgical issues  - CBC and BMP this AM  - Continue soft diet  - Continue current pain regimen, tylenol q6hrs, scheduled dilaudid PO, dilaudid IV PRN, fentanyl patch  - Replace electrolytes per protocol  - Encourage ambulation and IS  - PT/OT  - VTE prophylaxis   - Discharge today    JAIMIE Fry

## 2024-05-16 NOTE — PLAN OF CARE
A/Ox4, 2L on NC, NSR  No C/O of nausea or vomiting  Encouraged use of IS, achieving 500  Pain managed w/ schd Tylenol and dilaudid  Low fiber diet. Eating about 50% of meals today  Urine goal met per Post-op surg/onc protocol  Midline incision is open to air with staples  Comfort and safety measures maintained.  Patient and sister updated on Plan of care and discharge plan

## 2024-05-16 NOTE — PLAN OF CARE
Assumed care @1930  A/Ox4, 2L on NC, NSR  Abx infusing per order.  Encouraged use of IS  Pain managed w/schd and PRN meds  Low fiber diet; tolerating  Denies N/V  Urine goal met per surg/onc protocol  Midline incision w/gauze & tape; CDI  Comfort and safety measures maintained  Pt updated on POC

## 2024-05-16 NOTE — DISCHARGE INSTRUCTIONS
Soft Tissue Sarcoma Removal  Thank you for choosing the Surgical Oncology team at [facility].    Managing Your Pain  Follow the guidelines below to help manage your pain at home:  Take your medications as directed and as needed. You may also take 1000 mg of acetaminophen (Tylenol®) as needed for pain.  Call our office if the medication prescribed for you does not ease your pain.  Don't drive or drink alcohol while you're taking prescription pain medications.  Take enough medication to do your recommended exercises comfortably. However, it is normal for your pain to increase a little as you start to be more active.  Keep track of when you take your pain medication. It works best 30 to 45 minutes after you take it.   Pain medication may cause constipation  Managing Constipation  Go to the bathroom at the same time every day.   Exercise. Walking is an excellent form of exercise.  Drink 8 (8-ounce) glasses (2 liters) of liquids daily, if you can. Drink water, juices (such as prune juice), soups, ice cream shakes, and other drinks that don't have caffeine.   If you haven't had a bowel movement in 3 days, call your doctor or nurse.  Caring for Your Incision  It's normal for the skin below your incision to feel numb. This happens because some of the nerves were cut during your surgery. The numbness will go away over time.  The sutures (stitches) and/or staples in your incision will be removed in the office after surgery. This is usually about 1-2 weeks after sugery.   If the area around your incision is red, puffy, or if you have any drainage from your incision, contact our office.  Showering  You may shower 48 hours after surgery. When you shower, use soap to gently wash your incision. After you shower, pat the area dry with a clean towel. Don't rub over your incision. Leave your incision uncovered, unless there's drainage.  Avoid tub baths until your surgeon says it's okay.  Eating and Drinking  You may  resume your normal diet when you go home. However, you may want to begin with lighter foods and eat more as tolerated .  Drink plenty of liquids. Try to drink 8 (8-ounce) glasses of liquids every day. Don't drink alcohol until you check with your surgeon.  Activity and Exercise  Remember, recovery after surgery takes several weeks. It is common to feel tired or fatigued. Rest as needed.   Doing aerobic exercise, such as walking and stair climbing, will help you gain strength and feel better. Rest or stop as needed.  Don't lift anything heavier than 10 pounds for at least 8 weeks after your surgery or until your surgeon says it's okay.   Avoid strenuous activity and exercises until your surgeon says it's okay.   Ask your surgeon when you can return to work.  When to Call:  Contact our office if you experience the following symptoms:  Fever of 100.4° F (38°C) or higher  Cloudy or smelly drainage from the incision site  The skin around your incision is warm/red/swollen  Sudden increase in pain or new pain  Shaking chills  Fast pulse  Shortness of breath or chest pain  Nausea or vomiting   Diarrhea  Constipation that isn't relieved in 3 days  Signs of a bladder infection (urinating more often than usual, burning while urinating, bleeding or hesitancy while urinating).  Any new or unexplained symptoms    We will call you to set up a follow-up appointment.    Please arrive at the following location:  [facility]: [address] [place] [id number]  Nohelia VALERO [facility]: [address] [place] [address]  Please call us at [phone] if you are unable to make it to your appointment or if you have any questions.

## 2024-05-16 NOTE — CM/SW NOTE
DC planning, notified RN of format needed for O2 walk.  PT completed,however does not show SPO2 on RA with activity. Will need the following:       O2 Walk:   SPO2 % ON ROOM AIR __    SPO2% ON OXYGEN AT REST __ AT __  LITERS PER MINUTE    SPO2% AMBULATION ON ROOM AIR ___    SPO2% AMBULATION ON O2 ___ AT __  LITERS PER MINUTE    MD order:   Home O2 concentrator and portable tanks at ___ liters NC to be used ___ (frequency)  Diagnosis: ___  Duration of treatment: ___.  Evaluate for conserving device.     NPI # ___      MD documentation:   I had a face to face visit with this patient and I evaluated the patient's O2 saturations.  The patient is mobile in their home and is in need of a portable oxygen tank.  The home oxygen will improve the patient's condition.

## 2024-05-16 NOTE — PROGRESS NOTES
[facility]   part of [facility]     Hospitalist Progress Note     Citlali Leung Patient Status:  Inpatient    1960 MRN [id number]   Formerly [facility] 7NE-A Attending Christian Mary MD   Hosp Day # 6 PCP Janel Montenegrokwkaiser     Chief Complaint: Abdominal pain    Subjective:     Patient without acute events overnight. Pain controlled. Tolerating diet and + flatus. Hopes to go home today.     Objective:    Review of Systems:   A comprehensive review of systems was completed; pertinent positive and negatives stated in subjective.    Vital signs:  Temp:  [92 °F (33.3 °C)-98.9 °F (37.2 °C)] 98.4 °F (36.9 °C)  Pulse:  [71-88] 71  Resp:  [15-20] 18  BP: (116-174)/(66-89) 174/84  SpO2:  [90 %-99 %] 90 %    Physical Exam:    General: No acute distress, pleasant   Respiratory: Course BS  Cardiovascular: S1, S2, regular rate and rhythm  Abdomen: Soft, mild tenderness, non-distended, abd binder in place  Neuro: No new focal deficits.   Extremities: No edema    Diagnostic Data:    Labs:  Recent Labs   Lab 24  0649 24  0328 24  0433 24  0526 05/15/24  0612   WBC 16.0* 17.3* 14.9* 16.5* 16.0*   HGB 12.8 11.1* 10.5* 10.7* 10.9*   MCV 89.0 95.5 96.8 91.4 91.7   .0 245.0 239.0 260.0 323.0       Recent Labs   Lab 24  0649 24  1615 24  0328 24  0433 24  0526 05/15/24  0612 05/15/24  1811   *  --  112* 109* 109* 129*  --    BUN 5*  --  10 9 7* 5*  --    CREATSERUM 0.63  --  0.66 0.44* 0.54* 0.55  --    CA 9.2  --  10.1 9.9 9.7 10.0  --    ALB 2.8*  --  2.7* 2.6*  --   --   --      --  141 142 140 136  --    K 3.3*   < > 4.3  4.3 3.7 3.3* 3.1*  3.1* 3.6     --  113* 112 107 105  --    CO2 24.0  --  26.0 24.0 27.0 25.0  --    ALKPHO 72  --  71 84  --   --   --    AST 45*  --  47* 46*  --   --   --    ALT 24  --  24 24  --   --   --    BILT 0.5  --  0.5 0.6  --   --   --    TP 6.2*  --  6.3* 6.5  --   --   --     < > = values in this  interval not displayed.       Estimated Creatinine Clearance: 85 mL/min (based on SCr of 0.55 mg/dL).    No results for input(s): \"TROP\", \"TROPHS\", \"CK\" in the last 168 hours.    No results for input(s): \"PTP\", \"INR\" in the last 168 hours.               Microbiology    Hospital Encounter on 05/10/24   1. Blood Culture     Status: None (Preliminary result)    Collection Time: 05/12/24  6:15 PM    Specimen: Blood,peripheral   Result Value Ref Range    Blood Culture Result No Growth 3 Days N/A         Imaging: Reviewed in Epic.    Medications:    amLODIPine  2.5 mg Oral Nightly    amLODIPine  5 mg Oral Daily    HYDROmorphone  4 mg Oral q4h    potassium chloride  40 mEq Oral BID    acetaminophen  1,000 mg Oral q6h    piperacillin-tazobactam  3.375 g Intravenous Q8H ADELIA    enoxaparin  40 mg Subcutaneous Daily    famotidine  20 mg Oral BID    Or    famotidine  20 mg Intravenous BID    fluticasone furoate-vilanterol  1 puff Inhalation Daily    DULoxetine  60 mg Oral Daily    gabapentin  300 mg Oral TID    levothyroxine  175 mcg Oral Before breakfast    losartan  50 mg Oral Daily    fentaNYL  1 patch Transdermal Q72H    fentaNYL  1 patch Transdermal Q72H       Assessment & Plan:      #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  #Leiomyosarcoma   Surgery on 5/10  Pain control, Anti-emetics  Soft diet, advance per surgery  Lovenox, IS, ambulate  Post op care per surgery    #Sepsis due to Pneumonia  Fever, tachycardia, WBC elevated  CXR showed right middle lobe consolidation   Zosyn, transition to oral abx on DC  F/u blood cultures - NGTD    #Post op n/v  Continue prn compazine, reglan   improved    #Postop urinary retention  Montor bladder scans, may need dennis insertion   U/a clear    #Essential HTN - amlodipine, losartan   #Hypothyroidism - Synthroid   #VANDA - Duloxetine   #Peripheral neuropathy - Gabapentin  #S/p Spine stimulator in place   #Chronic bronchitis - Breo, prn albuterol   #Hypokalemia  - replete and trend, K 3.3  #Post op anemia:  hg stable    Blane Henderson MD      Supplementary Documentation:     Quality:  DVT Mechanical Prophylaxis:   SCDs, Early ambuation  DVT Pharmacologic Prophylaxis   Medication    enoxaparin (Lovenox) 40 MG/0.4ML SUBQ injection 40 mg                Code Status: Not on file  Aquino: No urinary catheter in place  Aquino Duration (in days): 2  Central line:    DAV: 5/16/2024    Discharge is dependent on: surgical recovery   At this point Ms. Leung is expected to be discharge to: Home    The 21st Century Cures Act makes medical notes like these available to patients in the interest of transparency. Please be advised this is a medical document. Medical documents are intended to carry relevant information, facts as evident, and the clinical opinion of the practitioner. The medical note is intended as peer to peer communication and may appear blunt or direct. It is written in medical language and may contain abbreviations or verbiage that are unfamiliar.

## 2024-05-17 ENCOUNTER — TELEPHONE (OUTPATIENT)
Dept: SURGERY | Facility: CLINIC | Age: 64
End: 2024-05-17

## 2024-05-17 NOTE — TELEPHONE ENCOUNTER
Returning pt's sister call, checking in on pt after discharge. Pt is doing well according to her sister she is eating and moving around much more better and is having steady bowel movements. She is having a bit of post op pain but denies nausea, drainage or redness. Pt is scheduled for a post op appt with JAIMIE Buchanan next week Thursday, they are advised to call with any questions or concerns between now and then. She verbalized understanding.

## 2024-05-18 NOTE — DISCHARGE SUMMARY
[facility]  Discharge Summary    Citlali Leung Patient Status:  Inpatient    1960 MRN [id number]   Location [facility] 7NE-A Attending No att. 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]  Sarcoma of pelvic peritoneum (HCC)    Discharge Diagnosis:   Patient Active Problem List   Diagnosis    Acute sinusitis, recurrence not specified, unspecified location    Arthritis    Bronchitis    Blepharitis of right lower eyelid    Breast pain, right    Other chronic pain    Chronic midline low back pain with left-sided sciatica    Cervical radiculopathy    Hypertension due to endocrine disorder    Hypokalemia    Insomnia    Left lumbar radiculopathy    Sarcoma of pelvic peritoneum (HCC)    Fibromyalgia    History of left breast cancer    Hyperlipidemia, unspecified hyperlipidemia type    Hypothyroidism, unspecified type    VANDA (generalized anxiety disorder)    Age-related osteoporosis without current pathological fracture    Abnormal EKG    COPD with acute exacerbation (HCC)    Diarrhea of presumed infectious origin    Disorder of thyroid    Generalized weakness    Hordeolum externum of right lower eyelid    Lumbar facet arthropathy    Osteopenia    Primary hyperparathyroidism (HCC)    Hypertension    Productive cough    Simple chronic bronchitis (HCC)    Vitamin D deficiency       Reason for Admission: Surgical Management    Physical Exam:     Constitutional:  NAD.   Eyes: non-icteric.    Abdomen: Soft, non-tender, non-distended. Incision healing well without drainage or erythema.   Musculoskeletal: no edema.    History of Present Illness:    Patient is a 63 year old woman who was referred for consideration of surgical oncology management of recently diagnosed leiomyosarcoma.  Patient has history of breast cancer in , s/p surgery, RT, chemo and 5 years of  tamoxifen.     11/14/2023: Patient began having left lower abdominal pain with nausea and vomiting.  She went to the ED in November where CT abdomen pelvis was performed showing irregular soft tissue mass in the posterior left pelvis measuring 2.5 x 4.3 cm.  Mass is closely associated with the left internal iliac vessels and left S2 nerve root.     11/15/2023: MRI performed showing 3.0 x 4.2 x 3.6 cm heterogenously enhancing mass inseparable from the left internal iliac vessels and encasing left exiting S2 nerve root.   CA-125: 33.0  CEA: 2.5  CA 19-9: 4.6  CA 27.29: 11  CA 15-3: 8     11/16/2023: CT chest with small right upper lobe nodule.  No comparison.      11/17/2023: Patient underwent CT-guided biopsy of left pelvic mass --> spindle cell neoplasm with features of leiomyoma with atypical nuclei, atypical smooth muscle neoplasm, MSI stable 7     11/21/2023: Repeat CT abdomen pelvis with IV contrast confirms enhancing solid mass in left posterior pelvis measuring 4.3 cm concerning for neoplasm     12/1/2023: Repeat CT abdomen pelvis redemonstrating solid mass, no significant change from prior     12/3/2023: CT brain without contrast performed due to vomiting, WNL     12/15/2023: Patient seen by Dr Jamil NM Surg Onc and Dr Horton NM Heme Onc, tumor deemed unresectable, ER/UT negative. Recommended ablative dose proton therapy     12/18/2023: Re-admitted for N/V. Repeat CT A/P, stable mass  12/21/2023: EGD performed while admitted showing peptic appearing duodenitis and gastritis, and gallego's esophagus     Surgical history includes cholecystectomy, hysterectomy, L3-4 left hemilamectomy, and left mastectomy. Patient has a family history of daughter with breast cancer, son with colorectal cancer, and 3 maternal aunts with breast cancer. She had genetic testing outpatient 20 years ago when she was diagnosed with breast cancer. She was diagnosed in 2005.      Interval History:  Patient underwent radiation to the mass  with Dr Gibson for 6 weeks which was finished on 2/26/2024. She underwent post radiation imaging, summarized below. She has been having increased constipation. She has occasional numbness in the left leg. She is able to walk but develops significant pain after walking 1.5 blocks.      She is currently taking fentanyl patch 37.5 mg every 3 days, hydromorphone 4 mg every 4-6 hours, gabapentin 300 mg three times a day, tizanidine 4 mg nightly, duloxetine 60 mg, and tylenol 1000 mg daily. Patient also has an implanted pain pump.     She is here for surgical management.     Hospital Course:  Patient underwent radical resection sarcoma left pelvis with resection of left internal iliac artery and vein. Exclusion of small bowel with omentum. Use of Plasma Jet to potentiate posterior margin.  Postoperative course was remarkable for elevated WBC and temperature on POD#2. Patient was started on Zosyn. Urine cultures were negative. CXR showed right lung opacity concerning for pneumonia. CT A/P done which showed no acute surgical issues. Bilateral doppler US of LE showed no DVT. Eventually WBC downtrended on Zosyn. Patient tolerated advances in her diet nicely.  Pain was controlled. She was discharged with Augmentin and lovenox.     Consultations: Hospitalist, Neurosurgery intra-operatively.     Procedures: As above    Complications: Right lobe opacity, possible pneumonia    Disposition: Home or Self Care    Discharge Condition: Good    Discharge Medications:   Discharge Medication List as of 5/16/2024 12:57 PM        START taking these medications    Details   enoxaparin 40 MG/0.4ML Injection Solution Prefilled Syringe Inject 0.4 mL (40 mg total) into the skin daily., Normal, Disp-22 each, R-0      amoxicillin clavulanate 875-125 MG Oral Tab Take 1 tablet by mouth 2 (two) times daily for 4 days., Normal, Disp-8 tablet, R-0           CONTINUE these medications which have CHANGED    Details   levothyroxine 175 MCG Oral Tab Take 1  tablet (175 mcg total) by mouth before breakfast., Historical           CONTINUE these medications which have NOT CHANGED    Details   !! amLODIPine 2.5 MG Oral Tab Take 2 tablets (5 mg total) by mouth every morning., Historical      !! amLODIPine 2.5 MG Oral Tab Take 1 tablet (2.5 mg total) by mouth at bedtime., Historical      acetaminophen 325 MG Oral Tab Take 2 tablets (650 mg total) by mouth every 6 (six) hours as needed., Historical      DULoxetine 60 MG Oral Cap DR Particles TAKE 1 CAPSULE BY MOUTH DAILY FOR CHRONIC MUSCLE OR BONE PAIN OR NERVE PAIN OR RECURRENT ANXIETY, Historical      gabapentin 300 MG Oral Cap Take 1 capsule (300 mg total) by mouth 3 (three) times daily., Historical      hydrOXYzine 25 MG Oral Tab Take 1 tablet (25 mg total) by mouth 2 (two) times daily as needed for Anxiety., Historical      ipratropium-albuterol 0.5-2.5 (3) MG/3ML Inhalation Solution Inhale 3 mL into the lungs every 6 (six) hours as needed., Historical      telmisartan 80 MG Oral Tab Take 0.5-1 tablets (40-80 mg total) by mouth daily., Historical      Budesonide-Formoterol Fumarate (SYMBICORT) 160-4.5 MCG/ACT Inhalation Aerosol Inhale 2 puffs into the lungs 2 (two) times daily., Historical      HYDROmorphone 4 MG Oral Tab Take 1 tablet (4 mg total) by mouth every 4 (four) hours as needed for Pain., Historical      fentaNYL 12 MCG/HR Transdermal Patch 72 Hr Place 1 patch onto the skin every 3 (three) days., Historical      fentaNYL 25 MCG/HR Transdermal Patch 72 Hr Place 1 patch onto the skin every third day., Historical      Naloxone HCl 4 MG/0.1ML Nasal Liquid SPRAY 1 SPRAY IN EACH NOSTRIL FOR OVERDOSE. MAY REPEAT EVERY 2-3 MINUTES IN ALTERNATING NOSTRILS UNTIL MEDICAL ASSISTANCE BECOMES AVAILABLE, Historical      ondansetron 4 MG Oral Tablet Dispersible DISSOLVE 1 TABLET ON THE TONGUE THREE TIMES DAILY FOR UP TO 10 DAYS AS NEEDED FOR NAUSEA OR VOMITING, Historical       !! - Potential duplicate medications found.  Please discuss with provider.          Follow up Visits: Follow-up with Kathryn Henderson PA-C in 1 week    Follow up Labs: CBC and CMP    JAIMIE Fry  5/18/2024  10:37 AM    done

## 2024-05-23 ENCOUNTER — HOSPITAL ENCOUNTER (OUTPATIENT)
Dept: ULTRASOUND IMAGING | Facility: HOSPITAL | Age: 64
Discharge: HOME | End: 2024-05-23

## 2024-05-23 ENCOUNTER — OFFICE VISIT (OUTPATIENT)
Dept: SURGERY | Facility: CLINIC | Age: 64
End: 2024-05-23

## 2024-05-23 VITALS
TEMPERATURE: 98 F | DIASTOLIC BLOOD PRESSURE: 92 MMHG | BODY MASS INDEX: 34 KG/M2 | WEIGHT: 184.81 LBS | SYSTOLIC BLOOD PRESSURE: 165 MMHG | RESPIRATION RATE: 20 BRPM | HEART RATE: 90 BPM

## 2024-05-23 DIAGNOSIS — M79.89 SWELLING OF LEFT LOWER EXTREMITY: ICD-10-CM

## 2024-05-23 DIAGNOSIS — M79.89 MASS OF SOFT TISSUE OF PELVIS: Primary | ICD-10-CM

## 2024-05-23 DIAGNOSIS — M79.89 MASS OF SOFT TISSUE OF PELVIS: ICD-10-CM

## 2024-05-23 LAB
ALBUMIN SERPL-MCNC: 3.1 G/DL (ref 3.4–5)
ALBUMIN/GLOB SERPL: 0.7 {RATIO} (ref 1–2)
ALP LIVER SERPL-CCNC: 126 U/L
ALT SERPL-CCNC: 21 U/L
ANION GAP SERPL CALC-SCNC: 3 MMOL/L (ref 0–18)
AST SERPL-CCNC: 12 U/L (ref 15–37)
BASOPHILS # BLD AUTO: 0.05 X10(3) UL (ref 0–0.2)
BASOPHILS NFR BLD AUTO: 0.4 %
BILIRUB SERPL-MCNC: 0.3 MG/DL (ref 0.1–2)
BUN BLD-MCNC: 5 MG/DL (ref 9–23)
CALCIUM BLD-MCNC: 10.3 MG/DL (ref 8.5–10.1)
CHLORIDE SERPL-SCNC: 110 MMOL/L (ref 98–112)
CO2 SERPL-SCNC: 27 MMOL/L (ref 21–32)
CREAT BLD-MCNC: 0.61 MG/DL
EGFRCR SERPLBLD CKD-EPI 2021: 102 ML/MIN/1.73M2 (ref 60–?)
EOSINOPHIL # BLD AUTO: 0.27 X10(3) UL (ref 0–0.7)
EOSINOPHIL NFR BLD AUTO: 2 %
ERYTHROCYTE [DISTWIDTH] IN BLOOD BY AUTOMATED COUNT: 12.9 %
FASTING STATUS PATIENT QL REPORTED: NO
GLOBULIN PLAS-MCNC: 4.4 G/DL (ref 2.8–4.4)
GLUCOSE BLD-MCNC: 119 MG/DL (ref 70–99)
HCT VFR BLD AUTO: 35.6 %
HGB BLD-MCNC: 11.9 G/DL
IMM GRANULOCYTES # BLD AUTO: 0.07 X10(3) UL (ref 0–1)
IMM GRANULOCYTES NFR BLD: 0.5 %
LYMPHOCYTES # BLD AUTO: 1.66 X10(3) UL (ref 1–4)
LYMPHOCYTES NFR BLD AUTO: 12.2 %
MCH RBC QN AUTO: 30.4 PG (ref 26–34)
MCHC RBC AUTO-ENTMCNC: 33.4 G/DL (ref 31–37)
MCV RBC AUTO: 90.8 FL
MONOCYTES # BLD AUTO: 0.8 X10(3) UL (ref 0.1–1)
MONOCYTES NFR BLD AUTO: 5.9 %
NEUTROPHILS # BLD AUTO: 10.81 X10 (3) UL (ref 1.5–7.7)
NEUTROPHILS # BLD AUTO: 10.81 X10(3) UL (ref 1.5–7.7)
NEUTROPHILS NFR BLD AUTO: 79 %
OSMOLALITY SERPL CALC.SUM OF ELEC: 288 MOSM/KG (ref 275–295)
PLATELET # BLD AUTO: 442 10(3)UL (ref 150–450)
POTASSIUM SERPL-SCNC: 4.1 MMOL/L (ref 3.5–5.1)
PROT SERPL-MCNC: 7.5 G/DL (ref 6.4–8.2)
RBC # BLD AUTO: 3.92 X10(6)UL
SODIUM SERPL-SCNC: 140 MMOL/L (ref 136–145)
WBC # BLD AUTO: 13.7 X10(3) UL (ref 4–11)

## 2024-05-23 PROCEDURE — 93971 EXTREMITY STUDY: CPT

## 2024-05-23 PROCEDURE — 3077F SYST BP >= 140 MM HG: CPT

## 2024-05-23 PROCEDURE — 85025 COMPLETE CBC W/AUTO DIFF WBC: CPT

## 2024-05-23 PROCEDURE — 99024 POSTOP FOLLOW-UP VISIT: CPT

## 2024-05-23 PROCEDURE — 3080F DIAST BP >= 90 MM HG: CPT

## 2024-05-23 PROCEDURE — 80053 COMPREHEN METABOLIC PANEL: CPT

## 2024-05-23 NOTE — PROGRESS NOTES
[facility] Surgical Oncology      Patient Name:  Citlali Leung   YOB: 1960   Gender:  Female   Appt Date:  5/23/2024   Provider:  JAIMIE Fry     PATIENT PROVIDERS  Referring Provider: Diana Dickey MD     Primary Care Provider:Janel Bearhekwube   Address: [address] [place] [address]   Phone #: [phone]       CHIEF COMPLAINT  Chief Complaint   Patient presents with    Post-Op        PROBLEMS  Reviewed   Patient Active Problem List   Diagnosis    Acute sinusitis, recurrence not specified, unspecified location    Arthritis    Bronchitis    Blepharitis of right lower eyelid    Breast pain, right    Other chronic pain    Chronic midline low back pain with left-sided sciatica    Cervical radiculopathy    Hypertension due to endocrine disorder    Hypokalemia    Insomnia    Left lumbar radiculopathy    Sarcoma of pelvic peritoneum (HCC)    Fibromyalgia    History of left breast cancer    Hyperlipidemia, unspecified hyperlipidemia type    Hypothyroidism, unspecified type    VANDA (generalized anxiety disorder)    Age-related osteoporosis without current pathological fracture    Abnormal EKG    COPD with acute exacerbation (HCC)    Diarrhea of presumed infectious origin    Disorder of thyroid    Generalized weakness    Hordeolum externum of right lower eyelid    Lumbar facet arthropathy    Osteopenia    Primary hyperparathyroidism (HCC)    Hypertension    Productive cough    Simple chronic bronchitis (HCC)    Vitamin D deficiency        History of Present Illness:  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  Patient has history of breast cancer in 2003, s/p surgery, RT, chemo and 5 years of tamoxifen.     11/14/2023: Patient began having left lower abdominal pain with nausea and vomiting.  She went to the ED in November  where CT abdomen pelvis was performed showing irregular soft tissue mass in the posterior left pelvis measuring 2.5 x 4.3 cm.  Mass is closely associated with the left internal iliac vessels and left S2 nerve root.     11/15/2023: MRI performed showing 3.0 x 4.2 x 3.6 cm heterogenously enhancing mass inseparable from the left internal iliac vessels and encasing left exiting S2 nerve root.   CA-125: 33.0  CEA: 2.5  CA 19-9: 4.6  CA 27.29: 11  CA 15-3: 8     11/16/2023: CT chest with small right upper lobe nodule.  No comparison.      11/17/2023: Patient underwent CT-guided biopsy of left pelvic mass --> spindle cell neoplasm with features of leiomyoma with atypical nuclei, atypical smooth muscle neoplasm, MSI stable 7     11/21/2023: Repeat CT abdomen pelvis with IV contrast confirms enhancing solid mass in left posterior pelvis measuring 4.3 cm concerning for neoplasm     12/1/2023: Repeat CT abdomen pelvis redemonstrating solid mass, no significant change from prior     12/3/2023: CT brain without contrast performed due to vomiting, WNL     12/15/2023: Patient seen by Dr Jamil [facility] Surg Onc and Dr Horton [facility] Heme Onc, tumor deemed unresectable, ER/KY negative. Recommended ablative dose proton therapy     12/18/2023: Re-admitted for N/V. Repeat CT A/P, stable mass  12/21/2023: EGD performed while admitted showing peptic appearing duodenitis and gastritis, and gallego's esophagus     Surgical history includes cholecystectomy, hysterectomy, L3-4 left hemilamectomy, and left mastectomy. Patient has a family history of daughter with breast cancer, son with colorectal cancer, and 3 maternal aunts with breast cancer. She had genetic testing outpatient 20 years ago when she was diagnosed with breast cancer. She was diagnosed in 2005.      Patient underwent radiation to the mass with Dr Gibson for 6 weeks which was finished on 2/26/2024. She underwent post radiation imaging, summarized below. She has been having increased  constipation. She has occasional numbness in the left leg. She is able to walk but develops significant pain after walking 1.5 blocks.      Preoperatively, patient was taking fentanyl patch 37.5 mg every 3 days, hydromorphone 4 mg every 4-6 hours, gabapentin 300 mg three times a day, tizanidine 4 mg nightly, duloxetine 60 mg, and tylenol 1000 mg daily. Patient also has an implanted pain pump.    Interval History:  5/10/2024: s/p radical resection sarcoma left pelvis with resection of left internal iliac artery and vein. Exclusion of small bowel with omentum. Use of Plasma Jet to potentiate posterior margin, intraoperative neuro monitoring    Postoperative course was remarkable for elevated WBC and temperature on POD#2. Patient was started on Zosyn. Urine cultures were negative. CXR showed right lung opacity concerning for pneumonia. CT A/P done which showed no acute surgical issues. Bilateral doppler US of LE showed no DVT. Eventually WBC downtrended on Zosyn. Patient tolerated advances in her diet nicely.  Pain was controlled. She was discharged with Augmentin and lovenox.     She continues to do lovenox. She has completed her post op antibiotics. She continues to use the fentanyl patch, dilaudid every 4 hours, tylenol, and gabapentin. She complains of left leg pain that aches and shoots down her leg that has gotten slightly worse since surgery.      Vital Signs:  BP (!) 165/92 (BP Location: Right arm, Patient Position: Sitting, Cuff Size: large)   Pulse 90   Temp 97.9 °F (36.6 °C) (Temporal)   Resp 20   Wt 83.8 kg (184 lb 12.8 oz)   BMI 33.80 kg/m²      Medications Reviewed:    Current Outpatient Medications:     enoxaparin 40 MG/0.4ML Injection Solution Prefilled Syringe, Inject 0.4 mL (40 mg total) into the skin daily., Disp: 22 each, Rfl: 0    levothyroxine 175 MCG Oral Tab, Take 1 tablet (175 mcg total) by mouth before breakfast., Disp: , Rfl:     amLODIPine 2.5 MG Oral Tab, Take 2 tablets (5 mg total) by  mouth every morning., Disp: , Rfl:     amLODIPine 2.5 MG Oral Tab, Take 1 tablet (2.5 mg total) by mouth at bedtime., Disp: , Rfl:     acetaminophen 325 MG Oral Tab, Take 2 tablets (650 mg total) by mouth every 6 (six) hours as needed., Disp: , Rfl:     DULoxetine 60 MG Oral Cap DR Particles, TAKE 1 CAPSULE BY MOUTH DAILY FOR CHRONIC MUSCLE OR BONE PAIN OR NERVE PAIN OR RECURRENT ANXIETY, Disp: , Rfl:     gabapentin 300 MG Oral Cap, Take 1 capsule (300 mg total) by mouth 3 (three) times daily., Disp: , Rfl:     hydrOXYzine 25 MG Oral Tab, Take 1 tablet (25 mg total) by mouth 2 (two) times daily as needed for Anxiety., Disp: , Rfl:     ipratropium-albuterol 0.5-2.5 (3) MG/3ML Inhalation Solution, Inhale 3 mL into the lungs every 6 (six) hours as needed., Disp: , Rfl:     telmisartan 80 MG Oral Tab, Take 0.5-1 tablets (40-80 mg total) by mouth daily., Disp: , Rfl:     Budesonide-Formoterol Fumarate (SYMBICORT) 160-4.5 MCG/ACT Inhalation Aerosol, Inhale 2 puffs into the lungs 2 (two) times daily., Disp: , Rfl:     HYDROmorphone 4 MG Oral Tab, Take 1 tablet (4 mg total) by mouth every 4 (four) hours as needed for Pain., Disp: , Rfl:     fentaNYL 12 MCG/HR Transdermal Patch 72 Hr, Place 1 patch onto the skin every 3 (three) days., Disp: , Rfl:     fentaNYL 25 MCG/HR Transdermal Patch 72 Hr, Place 1 patch onto the skin every third day., Disp: , Rfl:     Naloxone HCl 4 MG/0.1ML Nasal Liquid, SPRAY 1 SPRAY IN EACH NOSTRIL FOR OVERDOSE. MAY REPEAT EVERY 2-3 MINUTES IN ALTERNATING NOSTRILS UNTIL MEDICAL ASSISTANCE BECOMES AVAILABLE, Disp: , Rfl:     ondansetron 4 MG Oral Tablet Dispersible, DISSOLVE 1 TABLET ON THE TONGUE THREE TIMES DAILY FOR UP TO 10 DAYS AS NEEDED FOR NAUSEA OR VOMITING, Disp: , Rfl:      Allergies Reviewed:  Allergies   Allergen Reactions    Naproxen OTHER (SEE COMMENTS)    Omeprazole HIVES and ITCHING    Ondansetron OTHER (SEE COMMENTS)     Patient states she can take without difficulty      Radiology  Contrast Iodinated Dyes ITCHING     DYE from CT Scan causes itching per Patient.    CT contrast, per patient CT was stopped and needed Benadryl during procedure. C/O itching   DYE from CT Scan causes itching per Patient.    Iodine ITCHING        History:  Reviewed:  Past Medical History:    Cancer (HCC)    Disorder of thyroid    Exposure to medical diagnostic radiation    last tx 2024    High blood pressure    Hx of motion sickness    Visual impairment    glasses      Reviewed:  Past Surgical History:   Procedure Laterality Date    Cholecystectomy      Hysterectomy      Mastectomy left      Other surgical history      left hemilaminectomy L3-L4      Reviewed Social History:  Social History     Socioeconomic History    Marital status:    Tobacco Use    Smoking status: Former     Current packs/day: 0.00     Types: Cigarettes     Quit date:      Years since quittin.4    Smokeless tobacco: Never   Vaping Use    Vaping status: Never Used   Substance and Sexual Activity    Alcohol use: Not Currently    Drug use: Not Currently     Types: Cannabis     Social Determinants of Health     Food Insecurity: No Food Insecurity (2024)    Food Insecurity     Food Insecurity: Never true   Transportation Needs: No Transportation Needs (2024)    Transportation Needs     Lack of Transportation: No   Housing Stability: Low Risk  (2024)    Housing Stability     Housing Instability: No      Reviewed:  Family History   Problem Relation Age of Onset    Breast Cancer Daughter     Other (Colorectal Cancer) Son     Breast Cancer Maternal Aunt     Breast Cancer Maternal Aunt     Breast Cancer Maternal Aunt       Review of Systems:  Doing well post-operatively  Denies fever or chills  Denies chest pain or SOB  Denies abdominal pain or N/V  Denies dysuria or hematuria  Denies warmth, erythema, or drainage at incision       Physical Examination:  Constitutional:  NAD.   Eyes: non-icteric.    Abdomen: Soft,  non-tender, non-distended. Incision healing well without drainage or erythema. Staples in place.   Musculoskeletal: Left lower extremity edema extending to distal foot. Slightly larger than compared to right leg. No erythema.      Document Review:  Final Diagnosis Comment    C.  Sections show a proliferation of spindle cells in intersecting fascicles.  The cells have eosinophilic cytoplasm and spindled nuclei with pleomorphism and hyperchromasia.  Mitotic figures are rare (less than 2 per 10 high-power fields).  There are areas of hyalinization and sclerosis, compatible with treatment effect.  Immunostains for SMA, desmin, S100 and estrogen receptor (ER) are performed.  The lesional cells are positive for SMA and desmin and are negative for the remaining stains.     The findings are those of an atypical smooth muscle neoplasm with treatment effect.  The tumor viability is approximately 70%.     Final Diagnosis:   A.  Presacral posterior margin, excision:  -Positive for tumor.     B.  Omentum, biopsy:  -Fibroadipose tissue with no evidence of tumor.     C.  Retroperitoneal mass, resection:  -Atypical smooth muscle neoplasm (up to 3.6 cm) with treatment effect.  -The tumor involves all margins of resection.  -See comment.     D.  Anterior peritoneal margin, excision:  -Fibroadipose tissue with no evidence of tumor.        Procedure(s):  Staple Removal     Assessment / Plan:  Atypical Smooth Muscle Neoplasm  - No acute surgical issues  - Pathology reviewed with patient and sister. She expressed understanding. Informed pathology was reviewed at our multidisciplinary tumor board and recommend continued surveillance imaging at 3 month intervals. Patient agreed. For this, she can follow with Dr Dickey if she pleases.   - Incision site healing well without redness or drainage. Staples removed today. Steri strips placed over incision.   - Suspect left lower extremity edema is secondary to internal iliac vein and artery  removal with tumor resection. Left lower extremity US venous doppler ordered to rule out DVT, If negative, will manage conservatively with compression stockings and elevation.   - CBC and CMP today  - Continue current regimen of pain control. May need to increase gabapentin for nerve pain. Will discuss with her pain doctor best regimen for her.       Follow Up:  Pending US       Electronically Signed by: JAIMIE Fry    Addendum: Discussed with patient's sister. US negative for DVT. Recommended elevation and compression stockings. Home health PT assessing patient tomorrow. WBC mildly increased from discharge. Patient recently finished ABX course. Will CTM. Signs and symptoms to seek care discussed. Will have patient follow up on Tuesday.   JAIMIE Fry

## 2024-05-29 ENCOUNTER — HOSPITAL ENCOUNTER (EMERGENCY)
Facility: HOSPITAL | Age: 64
Discharge: LEFT BEFORE DISCHARGE | End: 2024-05-30

## 2024-05-29 VITALS
HEIGHT: 62 IN | WEIGHT: 189 LBS | OXYGEN SATURATION: 94 % | HEART RATE: 138 BPM | SYSTOLIC BLOOD PRESSURE: 129 MMHG | TEMPERATURE: 99 F | DIASTOLIC BLOOD PRESSURE: 87 MMHG | RESPIRATION RATE: 20 BRPM | BODY MASS INDEX: 34.78 KG/M2

## 2024-05-30 ENCOUNTER — APPOINTMENT (OUTPATIENT)
Dept: GENERAL RADIOLOGY | Facility: HOSPITAL | Age: 64
End: 2024-05-30
Attending: EMERGENCY MEDICINE
Payer: COMMERCIAL

## 2024-05-30 ENCOUNTER — HOSPITAL ENCOUNTER (INPATIENT)
Facility: HOSPITAL | Age: 64
LOS: 2 days | Discharge: HOME HEALTH | End: 2024-06-01
Attending: EMERGENCY MEDICINE
Payer: COMMERCIAL

## 2024-05-30 ENCOUNTER — APPOINTMENT (OUTPATIENT)
Dept: CT IMAGING | Facility: HOSPITAL | Age: 64
End: 2024-05-30
Attending: EMERGENCY MEDICINE
Payer: COMMERCIAL

## 2024-05-30 ENCOUNTER — OFFICE VISIT (OUTPATIENT)
Dept: SURGERY | Facility: CLINIC | Age: 64
End: 2024-05-30

## 2024-05-30 ENCOUNTER — HOSPITAL ENCOUNTER (OUTPATIENT)
Dept: CT IMAGING | Facility: HOSPITAL | Age: 64
Discharge: HOME | End: 2024-05-30
Payer: COMMERCIAL

## 2024-05-30 VITALS
TEMPERATURE: 100 F | BODY MASS INDEX: 33 KG/M2 | HEART RATE: 99 BPM | SYSTOLIC BLOOD PRESSURE: 143 MMHG | RESPIRATION RATE: 20 BRPM | WEIGHT: 179.19 LBS | DIASTOLIC BLOOD PRESSURE: 91 MMHG

## 2024-05-30 DIAGNOSIS — M79.89 MASS OF SOFT TISSUE OF PELVIS: ICD-10-CM

## 2024-05-30 DIAGNOSIS — C49.9 SARCOMA (HCC): ICD-10-CM

## 2024-05-30 DIAGNOSIS — R10.9 ABDOMINAL PAIN, ACUTE: Primary | ICD-10-CM

## 2024-05-30 DIAGNOSIS — M79.89 MASS OF SOFT TISSUE OF PELVIS: Primary | ICD-10-CM

## 2024-05-30 DIAGNOSIS — D72.829 LEUKOCYTOSIS, UNSPECIFIED TYPE: ICD-10-CM

## 2024-05-30 DIAGNOSIS — N13.30 HYDRONEPHROSIS, LEFT: ICD-10-CM

## 2024-05-30 LAB
ALBUMIN SERPL-MCNC: 3 G/DL (ref 3.4–5)
ALBUMIN SERPL-MCNC: 3.1 G/DL (ref 3.4–5)
ALBUMIN/GLOB SERPL: 0.7 {RATIO} (ref 1–2)
ALBUMIN/GLOB SERPL: 0.7 {RATIO} (ref 1–2)
ALP LIVER SERPL-CCNC: 126 U/L
ALP LIVER SERPL-CCNC: 127 U/L
ALT SERPL-CCNC: 15 U/L
ALT SERPL-CCNC: 16 U/L
ANION GAP SERPL CALC-SCNC: 11 MMOL/L (ref 0–18)
ANION GAP SERPL CALC-SCNC: 9 MMOL/L (ref 0–18)
AST SERPL-CCNC: 16 U/L (ref 15–37)
AST SERPL-CCNC: 18 U/L (ref 15–37)
ATRIAL RATE: 90 BPM
BASOPHILS # BLD AUTO: 0.05 X10(3) UL (ref 0–0.2)
BASOPHILS # BLD AUTO: 0.06 X10(3) UL (ref 0–0.2)
BASOPHILS NFR BLD AUTO: 0.2 %
BASOPHILS NFR BLD AUTO: 0.3 %
BILIRUB SERPL-MCNC: 0.3 MG/DL (ref 0.1–2)
BILIRUB SERPL-MCNC: 0.5 MG/DL (ref 0.1–2)
BILIRUB UR QL STRIP.AUTO: NEGATIVE
BUN BLD-MCNC: 9 MG/DL (ref 9–23)
BUN BLD-MCNC: 9 MG/DL (ref 9–23)
CALCIUM BLD-MCNC: 10.5 MG/DL (ref 8.5–10.1)
CALCIUM BLD-MCNC: 10.8 MG/DL (ref 8.5–10.1)
CHLORIDE SERPL-SCNC: 104 MMOL/L (ref 98–112)
CHLORIDE SERPL-SCNC: 105 MMOL/L (ref 98–112)
CO2 SERPL-SCNC: 22 MMOL/L (ref 21–32)
CO2 SERPL-SCNC: 23 MMOL/L (ref 21–32)
COLOR UR AUTO: YELLOW
CREAT BLD-MCNC: 0.8 MG/DL
CREAT BLD-MCNC: 0.89 MG/DL
EGFRCR SERPLBLD CKD-EPI 2021: 74 ML/MIN/1.73M2 (ref 60–?)
EGFRCR SERPLBLD CKD-EPI 2021: 84 ML/MIN/1.73M2 (ref 60–?)
EOSINOPHIL # BLD AUTO: 0.01 X10(3) UL (ref 0–0.7)
EOSINOPHIL # BLD AUTO: 0.02 X10(3) UL (ref 0–0.7)
EOSINOPHIL NFR BLD AUTO: 0 %
EOSINOPHIL NFR BLD AUTO: 0.1 %
ERYTHROCYTE [DISTWIDTH] IN BLOOD BY AUTOMATED COUNT: 13.2 %
ERYTHROCYTE [DISTWIDTH] IN BLOOD BY AUTOMATED COUNT: 13.2 %
GLOBULIN PLAS-MCNC: 4.4 G/DL (ref 2.8–4.4)
GLOBULIN PLAS-MCNC: 4.4 G/DL (ref 2.8–4.4)
GLUCOSE BLD-MCNC: 132 MG/DL (ref 70–99)
GLUCOSE BLD-MCNC: 141 MG/DL (ref 70–99)
GLUCOSE UR STRIP.AUTO-MCNC: NORMAL MG/DL
HCT VFR BLD AUTO: 36.7 %
HCT VFR BLD AUTO: 39.3 %
HGB BLD-MCNC: 12.4 G/DL
HGB BLD-MCNC: 12.7 G/DL
IMM GRANULOCYTES # BLD AUTO: 0.12 X10(3) UL (ref 0–1)
IMM GRANULOCYTES # BLD AUTO: 0.14 X10(3) UL (ref 0–1)
IMM GRANULOCYTES NFR BLD: 0.6 %
IMM GRANULOCYTES NFR BLD: 0.6 %
LACTATE SERPL-SCNC: 1.4 MMOL/L (ref 0.4–2)
LACTATE SERPL-SCNC: 2.5 MMOL/L (ref 0.4–2)
LEUKOCYTE ESTERASE UR QL STRIP.AUTO: 25
LYMPHOCYTES # BLD AUTO: 0.89 X10(3) UL (ref 1–4)
LYMPHOCYTES # BLD AUTO: 1.07 X10(3) UL (ref 1–4)
LYMPHOCYTES NFR BLD AUTO: 4.1 %
LYMPHOCYTES NFR BLD AUTO: 5.1 %
MCH RBC QN AUTO: 29.6 PG (ref 26–34)
MCH RBC QN AUTO: 30.2 PG (ref 26–34)
MCHC RBC AUTO-ENTMCNC: 32.3 G/DL (ref 31–37)
MCHC RBC AUTO-ENTMCNC: 33.8 G/DL (ref 31–37)
MCV RBC AUTO: 89.3 FL
MCV RBC AUTO: 91.6 FL
MONOCYTES # BLD AUTO: 0.5 X10(3) UL (ref 0.1–1)
MONOCYTES # BLD AUTO: 0.64 X10(3) UL (ref 0.1–1)
MONOCYTES NFR BLD AUTO: 2.4 %
MONOCYTES NFR BLD AUTO: 2.9 %
NEUTROPHILS # BLD AUTO: 19.23 X10 (3) UL (ref 1.5–7.7)
NEUTROPHILS # BLD AUTO: 19.23 X10(3) UL (ref 1.5–7.7)
NEUTROPHILS # BLD AUTO: 20.12 X10 (3) UL (ref 1.5–7.7)
NEUTROPHILS # BLD AUTO: 20.12 X10(3) UL (ref 1.5–7.7)
NEUTROPHILS NFR BLD AUTO: 91.6 %
NEUTROPHILS NFR BLD AUTO: 92.1 %
NITRITE UR QL STRIP.AUTO: NEGATIVE
OSMOLALITY SERPL CALC.SUM OF ELEC: 285 MOSM/KG (ref 275–295)
OSMOLALITY SERPL CALC.SUM OF ELEC: 285 MOSM/KG (ref 275–295)
P AXIS: 40 DEGREES
P-R INTERVAL: 128 MS
PH UR STRIP.AUTO: 6 [PH] (ref 5–8)
PLATELET # BLD AUTO: 381 10(3)UL (ref 150–450)
PLATELET # BLD AUTO: 397 10(3)UL (ref 150–450)
POTASSIUM SERPL-SCNC: 3.4 MMOL/L (ref 3.5–5.1)
POTASSIUM SERPL-SCNC: 3.7 MMOL/L (ref 3.5–5.1)
PROCALCITONIN SERPL-MCNC: 27 NG/ML (ref ?–0.16)
PROT SERPL-MCNC: 7.4 G/DL (ref 6.4–8.2)
PROT SERPL-MCNC: 7.5 G/DL (ref 6.4–8.2)
PROT UR STRIP.AUTO-MCNC: 30 MG/DL
Q-T INTERVAL: 362 MS
QRS DURATION: 74 MS
QTC CALCULATION (BEZET): 442 MS
R AXIS: 26 DEGREES
RBC # BLD AUTO: 4.11 X10(6)UL
RBC # BLD AUTO: 4.29 X10(6)UL
RBC #/AREA URNS AUTO: >10 /HPF
SODIUM SERPL-SCNC: 137 MMOL/L (ref 136–145)
SODIUM SERPL-SCNC: 137 MMOL/L (ref 136–145)
SP GR UR STRIP.AUTO: 1.03 (ref 1–1.03)
T AXIS: 68 DEGREES
UROBILINOGEN UR STRIP.AUTO-MCNC: NORMAL MG/DL
VENTRICULAR RATE: 90 BPM
WBC # BLD AUTO: 21 X10(3) UL (ref 4–11)
WBC # BLD AUTO: 21.9 X10(3) UL (ref 4–11)

## 2024-05-30 PROCEDURE — 99024 POSTOP FOLLOW-UP VISIT: CPT

## 2024-05-30 PROCEDURE — 3077F SYST BP >= 140 MM HG: CPT

## 2024-05-30 PROCEDURE — 3080F DIAST BP >= 90 MM HG: CPT

## 2024-05-30 PROCEDURE — 80053 COMPREHEN METABOLIC PANEL: CPT

## 2024-05-30 PROCEDURE — 71045 X-RAY EXAM CHEST 1 VIEW: CPT | Performed by: EMERGENCY MEDICINE

## 2024-05-30 PROCEDURE — 85025 COMPLETE CBC W/AUTO DIFF WBC: CPT

## 2024-05-30 PROCEDURE — 74176 CT ABD & PELVIS W/O CONTRAST: CPT

## 2024-05-30 PROCEDURE — 71250 CT THORAX DX C-: CPT

## 2024-05-30 PROCEDURE — 74176 CT ABD & PELVIS W/O CONTRAST: CPT | Performed by: EMERGENCY MEDICINE

## 2024-05-30 RX ORDER — DULOXETIN HYDROCHLORIDE 30 MG/1
60 CAPSULE, DELAYED RELEASE ORAL DAILY
Status: DISCONTINUED | OUTPATIENT
Start: 2024-05-31 | End: 2024-06-01

## 2024-05-30 RX ORDER — GABAPENTIN 300 MG/1
300 CAPSULE ORAL 3 TIMES DAILY
Status: DISCONTINUED | OUTPATIENT
Start: 2024-05-30 | End: 2024-06-01

## 2024-05-30 RX ORDER — SODIUM CHLORIDE 9 MG/ML
1000 INJECTION, SOLUTION INTRAVENOUS ONCE
Status: COMPLETED | OUTPATIENT
Start: 2024-05-30 | End: 2024-05-30

## 2024-05-30 RX ORDER — ACETAMINOPHEN 325 MG/1
650 TABLET ORAL EVERY 6 HOURS PRN
Status: DISCONTINUED | OUTPATIENT
Start: 2024-05-30 | End: 2024-06-01

## 2024-05-30 RX ORDER — MORPHINE SULFATE 4 MG/ML
4 INJECTION, SOLUTION INTRAMUSCULAR; INTRAVENOUS EVERY 30 MIN PRN
Status: ACTIVE | OUTPATIENT
Start: 2024-05-30 | End: 2024-05-30

## 2024-05-30 RX ORDER — PROCHLORPERAZINE EDISYLATE 5 MG/ML
5 INJECTION INTRAMUSCULAR; INTRAVENOUS EVERY 8 HOURS PRN
Status: DISCONTINUED | OUTPATIENT
Start: 2024-05-30 | End: 2024-06-01

## 2024-05-30 RX ORDER — HEPARIN SODIUM 5000 [USP'U]/ML
5000 INJECTION, SOLUTION INTRAVENOUS; SUBCUTANEOUS EVERY 8 HOURS SCHEDULED
Status: DISCONTINUED | OUTPATIENT
Start: 2024-05-30 | End: 2024-06-01

## 2024-05-30 RX ORDER — DEXTROSE MONOHYDRATE AND SODIUM CHLORIDE 5; .45 G/100ML; G/100ML
INJECTION, SOLUTION INTRAVENOUS CONTINUOUS
Status: ACTIVE | OUTPATIENT
Start: 2024-05-30 | End: 2024-05-30

## 2024-05-30 RX ORDER — HYDROMORPHONE HYDROCHLORIDE 2 MG/1
4 TABLET ORAL EVERY 4 HOURS PRN
Status: DISCONTINUED | OUTPATIENT
Start: 2024-05-30 | End: 2024-06-01

## 2024-05-30 RX ORDER — LOSARTAN POTASSIUM 100 MG/1
100 TABLET ORAL DAILY
Status: DISCONTINUED | OUTPATIENT
Start: 2024-05-31 | End: 2024-06-01

## 2024-05-30 RX ORDER — SODIUM CHLORIDE 9 MG/ML
INJECTION, SOLUTION INTRAVENOUS CONTINUOUS
Status: DISCONTINUED | OUTPATIENT
Start: 2024-05-30 | End: 2024-06-01

## 2024-05-30 RX ORDER — FENTANYL 25 UG/1
1 PATCH TRANSDERMAL
Status: DISCONTINUED | OUTPATIENT
Start: 2024-05-31 | End: 2024-06-01

## 2024-05-30 RX ORDER — MORPHINE SULFATE 4 MG/ML
INJECTION, SOLUTION INTRAMUSCULAR; INTRAVENOUS
Status: COMPLETED
Start: 2024-05-30 | End: 2024-05-30

## 2024-05-30 RX ORDER — FENTANYL 12.5 UG/1
1 PATCH TRANSDERMAL
Status: DISCONTINUED | OUTPATIENT
Start: 2024-05-31 | End: 2024-06-01

## 2024-05-30 RX ORDER — ACETAMINOPHEN 500 MG
500 TABLET ORAL EVERY 4 HOURS PRN
Status: DISCONTINUED | OUTPATIENT
Start: 2024-05-30 | End: 2024-06-01

## 2024-05-30 RX ORDER — MORPHINE SULFATE 4 MG/ML
4 INJECTION, SOLUTION INTRAMUSCULAR; INTRAVENOUS ONCE
Status: COMPLETED | OUTPATIENT
Start: 2024-05-30 | End: 2024-05-30

## 2024-05-30 RX ORDER — FLUTICASONE FUROATE AND VILANTEROL 200; 25 UG/1; UG/1
1 POWDER RESPIRATORY (INHALATION) DAILY
Status: DISCONTINUED | OUTPATIENT
Start: 2024-05-31 | End: 2024-06-01

## 2024-05-30 RX ORDER — AMLODIPINE BESYLATE 2.5 MG/1
2.5 TABLET ORAL NIGHTLY
Status: DISCONTINUED | OUTPATIENT
Start: 2024-05-31 | End: 2024-06-01

## 2024-05-30 RX ORDER — IPRATROPIUM BROMIDE AND ALBUTEROL SULFATE 2.5; .5 MG/3ML; MG/3ML
3 SOLUTION RESPIRATORY (INHALATION) EVERY 6 HOURS PRN
Status: DISCONTINUED | OUTPATIENT
Start: 2024-05-30 | End: 2024-06-01

## 2024-05-30 RX ORDER — AMLODIPINE BESYLATE 5 MG/1
5 TABLET ORAL EVERY MORNING
Status: DISCONTINUED | OUTPATIENT
Start: 2024-05-31 | End: 2024-06-01

## 2024-05-30 RX ORDER — HYDROXYZINE HYDROCHLORIDE 25 MG/1
25 TABLET, FILM COATED ORAL 2 TIMES DAILY PRN
Status: DISCONTINUED | OUTPATIENT
Start: 2024-05-30 | End: 2024-06-01

## 2024-05-30 NOTE — PROGRESS NOTES
[facility] Surgical Oncology      Patient Name:  Citlali Leung   YOB: 1960   Gender:  Female   Appt Date:  5/30/2024   Provider:  JAIMIE Fry     PATIENT PROVIDERS  Referring Provider: Diana Dickey MD     Primary Care Provider:Janel Bearhekwube   Address: [address] [place] [address]   Phone #: [phone]       CHIEF COMPLAINT  Chief Complaint   Patient presents with    Post-Op        PROBLEMS  Reviewed   Patient Active Problem List   Diagnosis    Acute sinusitis, recurrence not specified, unspecified location    Arthritis    Bronchitis    Blepharitis of right lower eyelid    Breast pain, right    Other chronic pain    Chronic midline low back pain with left-sided sciatica    Cervical radiculopathy    Hypertension due to endocrine disorder    Hypokalemia    Insomnia    Left lumbar radiculopathy    Sarcoma of pelvic peritoneum (HCC)    Fibromyalgia    History of left breast cancer    Hyperlipidemia, unspecified hyperlipidemia type    Hypothyroidism, unspecified type    VANDA (generalized anxiety disorder)    Age-related osteoporosis without current pathological fracture    Abnormal EKG    COPD with acute exacerbation (HCC)    Diarrhea of presumed infectious origin    Disorder of thyroid    Generalized weakness    Hordeolum externum of right lower eyelid    Lumbar facet arthropathy    Osteopenia    Primary hyperparathyroidism (HCC)    Hypertension    Productive cough    Simple chronic bronchitis (HCC)    Vitamin D deficiency        History of Present Illness:  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  Patient has history of breast cancer in 2003, s/p surgery, RT, chemo and 5 years of tamoxifen.     11/14/2023: Patient began having left lower abdominal pain with nausea and vomiting.  She went to the ED in November  where CT abdomen pelvis was performed showing irregular soft tissue mass in the posterior left pelvis measuring 2.5 x 4.3 cm.  Mass is closely associated with the left internal iliac vessels and left S2 nerve root.     11/15/2023: MRI performed showing 3.0 x 4.2 x 3.6 cm heterogenously enhancing mass inseparable from the left internal iliac vessels and encasing left exiting S2 nerve root.   CA-125: 33.0  CEA: 2.5  CA 19-9: 4.6  CA 27.29: 11  CA 15-3: 8     11/16/2023: CT chest with small right upper lobe nodule.  No comparison.      11/17/2023: Patient underwent CT-guided biopsy of left pelvic mass --> spindle cell neoplasm with features of leiomyoma with atypical nuclei, atypical smooth muscle neoplasm, MSI stable 7     11/21/2023: Repeat CT abdomen pelvis with IV contrast confirms enhancing solid mass in left posterior pelvis measuring 4.3 cm concerning for neoplasm     12/1/2023: Repeat CT abdomen pelvis redemonstrating solid mass, no significant change from prior     12/3/2023: CT brain without contrast performed due to vomiting, WNL     12/15/2023: Patient seen by Dr Jamil [facility] Surg Onc and Dr Horton [facility] Heme Onc, tumor deemed unresectable, ER/NC negative. Recommended ablative dose proton therapy     12/18/2023: Re-admitted for N/V. Repeat CT A/P, stable mass  12/21/2023: EGD performed while admitted showing peptic appearing duodenitis and gastritis, and gallego's esophagus     Surgical history includes cholecystectomy, hysterectomy, L3-4 left hemilamectomy, and left mastectomy. Patient has a family history of daughter with breast cancer, son with colorectal cancer, and 3 maternal aunts with breast cancer. She had genetic testing outpatient 20 years ago when she was diagnosed with breast cancer. She was diagnosed in 2005.      Patient underwent radiation to the mass with Dr Gibson for 6 weeks which was finished on 2/26/2024. She underwent post radiation imaging, summarized below. She has been having increased  constipation. She has occasional numbness in the left leg. She is able to walk but develops significant pain after walking 1.5 blocks.      Preoperatively, patient was taking fentanyl patch 37.5 mg every 3 days, hydromorphone 4 mg every 4-6 hours, gabapentin 300 mg three times a day, tizanidine 4 mg nightly, duloxetine 60 mg, and tylenol 1000 mg daily. Patient also has an implanted pain pump.     Interval History:  5/10/2024: s/p radical resection sarcoma left pelvis with resection of left internal iliac artery and vein. Exclusion of small bowel with omentum. Use of Plasma Jet to potentiate posterior margin, intraoperative neuro monitoring    5/29/2024: Called patient to set up post op appointment as she did not come to office on Tuesday due to constipation. Patient denied fevers or chills at the time. Pain was slightly increased but maintained. Denied nausea or vomiting. Scheduled for appointment on 5/30. Patient then reported to the ED late at night due to feeling feverish and chills. Spouse states temperature was less than 100. Patient left ED due to pain.     5/30/2024: Patient states new onset nausea, vomiting and fever started 5/29 at 9:30 at night. She reported increased left leg pain. She states she vomited 2-3 times, she is unsure of the color. She states she had fever of 101 at home and felt feverish. She did note an episode of shortness of breath that improved after using her inhaler.  She has had no additional episodes of vomiting. She had a loose bowel movement this morning but has been dealing with constipation earlier in the week.      Vital Signs:  No current facility-administered medications on file prior to visit.     Current Outpatient Medications on File Prior to Visit   Medication Sig Dispense Refill    enoxaparin 40 MG/0.4ML Injection Solution Prefilled Syringe Inject 0.4 mL (40 mg total) into the skin daily. (Patient not taking: Reported on 5/30/2024) 22 each 0    levothyroxine 175 MCG Oral  Tab Take 1 tablet (175 mcg total) by mouth before breakfast.      amLODIPine 2.5 MG Oral Tab Take 2 tablets (5 mg total) by mouth every morning.      amLODIPine 2.5 MG Oral Tab Take 1 tablet (2.5 mg total) by mouth at bedtime.      acetaminophen 325 MG Oral Tab Take 2 tablets (650 mg total) by mouth every 6 (six) hours as needed.      DULoxetine 60 MG Oral Cap DR Particles TAKE 1 CAPSULE BY MOUTH DAILY FOR CHRONIC MUSCLE OR BONE PAIN OR NERVE PAIN OR RECURRENT ANXIETY      gabapentin 300 MG Oral Cap Take 1 capsule (300 mg total) by mouth 3 (three) times daily.      hydrOXYzine 25 MG Oral Tab Take 1 tablet (25 mg total) by mouth 2 (two) times daily as needed for Anxiety.      ipratropium-albuterol 0.5-2.5 (3) MG/3ML Inhalation Solution Inhale 3 mL into the lungs every 6 (six) hours as needed.      telmisartan 80 MG Oral Tab Take 0.5-1 tablets (40-80 mg total) by mouth daily.      Budesonide-Formoterol Fumarate (SYMBICORT) 160-4.5 MCG/ACT Inhalation Aerosol Inhale 2 puffs into the lungs 2 (two) times daily.      HYDROmorphone 4 MG Oral Tab Take 1 tablet (4 mg total) by mouth every 4 (four) hours as needed for Pain.      fentaNYL 12 MCG/HR Transdermal Patch 72 Hr Place 1 patch onto the skin every 3 (three) days.      fentaNYL 25 MCG/HR Transdermal Patch 72 Hr Place 1 patch onto the skin every third day.      Naloxone HCl 4 MG/0.1ML Nasal Liquid SPRAY 1 SPRAY IN EACH NOSTRIL FOR OVERDOSE. MAY REPEAT EVERY 2-3 MINUTES IN ALTERNATING NOSTRILS UNTIL MEDICAL ASSISTANCE BECOMES AVAILABLE      ondansetron 4 MG Oral Tablet Dispersible DISSOLVE 1 TABLET ON THE TONGUE THREE TIMES DAILY FOR UP TO 10 DAYS AS NEEDED FOR NAUSEA OR VOMITING          Medications Reviewed:    Current Outpatient Medications:     enoxaparin 40 MG/0.4ML Injection Solution Prefilled Syringe, Inject 0.4 mL (40 mg total) into the skin daily. (Patient not taking: Reported on 5/30/2024), Disp: 22 each, Rfl: 0    levothyroxine 175 MCG Oral Tab, Take 1 tablet  (175 mcg total) by mouth before breakfast., Disp: , Rfl:     amLODIPine 2.5 MG Oral Tab, Take 2 tablets (5 mg total) by mouth every morning., Disp: , Rfl:     amLODIPine 2.5 MG Oral Tab, Take 1 tablet (2.5 mg total) by mouth at bedtime., Disp: , Rfl:     acetaminophen 325 MG Oral Tab, Take 2 tablets (650 mg total) by mouth every 6 (six) hours as needed., Disp: , Rfl:     DULoxetine 60 MG Oral Cap DR Particles, TAKE 1 CAPSULE BY MOUTH DAILY FOR CHRONIC MUSCLE OR BONE PAIN OR NERVE PAIN OR RECURRENT ANXIETY, Disp: , Rfl:     gabapentin 300 MG Oral Cap, Take 1 capsule (300 mg total) by mouth 3 (three) times daily., Disp: , Rfl:     hydrOXYzine 25 MG Oral Tab, Take 1 tablet (25 mg total) by mouth 2 (two) times daily as needed for Anxiety., Disp: , Rfl:     ipratropium-albuterol 0.5-2.5 (3) MG/3ML Inhalation Solution, Inhale 3 mL into the lungs every 6 (six) hours as needed., Disp: , Rfl:     telmisartan 80 MG Oral Tab, Take 0.5-1 tablets (40-80 mg total) by mouth daily., Disp: , Rfl:     Budesonide-Formoterol Fumarate (SYMBICORT) 160-4.5 MCG/ACT Inhalation Aerosol, Inhale 2 puffs into the lungs 2 (two) times daily., Disp: , Rfl:     HYDROmorphone 4 MG Oral Tab, Take 1 tablet (4 mg total) by mouth every 4 (four) hours as needed for Pain., Disp: , Rfl:     fentaNYL 12 MCG/HR Transdermal Patch 72 Hr, Place 1 patch onto the skin every 3 (three) days., Disp: , Rfl:     fentaNYL 25 MCG/HR Transdermal Patch 72 Hr, Place 1 patch onto the skin every third day., Disp: , Rfl:     Naloxone HCl 4 MG/0.1ML Nasal Liquid, SPRAY 1 SPRAY IN EACH NOSTRIL FOR OVERDOSE. MAY REPEAT EVERY 2-3 MINUTES IN ALTERNATING NOSTRILS UNTIL MEDICAL ASSISTANCE BECOMES AVAILABLE, Disp: , Rfl:     ondansetron 4 MG Oral Tablet Dispersible, DISSOLVE 1 TABLET ON THE TONGUE THREE TIMES DAILY FOR UP TO 10 DAYS AS NEEDED FOR NAUSEA OR VOMITING, Disp: , Rfl:      Allergies Reviewed:  Allergies   Allergen Reactions    Naproxen OTHER (SEE COMMENTS)    Omeprazole  HIVES and ITCHING    Ondansetron OTHER (SEE COMMENTS)     Patient states she can take without difficulty      Radiology Contrast Iodinated Dyes ITCHING     DYE from CT Scan causes itching per Patient.    CT contrast, per patient CT was stopped and needed Benadryl during procedure. C/O itching   DYE from CT Scan causes itching per Patient.    Iodine ITCHING        History:  Reviewed:  Past Medical History:    Cancer (HCC)    Disorder of thyroid    Exposure to medical diagnostic radiation    last tx 2024    High blood pressure    Hx of motion sickness    Visual impairment    glasses      Reviewed:  Past Surgical History:   Procedure Laterality Date    Cholecystectomy      Hysterectomy      Mastectomy left      Other surgical history      left hemilaminectomy L3-L4      Reviewed Social History:  Social History     Socioeconomic History    Marital status:    Tobacco Use    Smoking status: Former     Current packs/day: 0.00     Types: Cigarettes     Quit date:      Years since quittin.4    Smokeless tobacco: Never   Vaping Use    Vaping status: Never Used   Substance and Sexual Activity    Alcohol use: Not Currently    Drug use: Not Currently     Types: Cannabis     Social Determinants of Health     Food Insecurity: No Food Insecurity (2024)    Food Insecurity     Food Insecurity: Never true   Transportation Needs: No Transportation Needs (2024)    Transportation Needs     Lack of Transportation: No   Housing Stability: Low Risk  (2024)    Housing Stability     Housing Instability: No      Reviewed:  Family History   Problem Relation Age of Onset    Breast Cancer Daughter     Other (Colorectal Cancer) Son     Breast Cancer Maternal Aunt     Breast Cancer Maternal Aunt     Breast Cancer Maternal Aunt       Review of Systems:  Doing well post-operatively  Confirms feeling feverish and chills  Denies chest pain or SOB  Denies abdominal pain. + N/V  Denies dysuria or hematuria  Denies  warmth, erythema, or drainage at incision       Physical Examination:  Constitutional: NAD.   Eyes: Sclera: non-icteric.   Neck: Neck: supple.   Lungs: No increased work of breathing  Abdomen: Soft, mildly distended, incision site healing well without erythema or drainage.   Musculoskeletal: Extremities: no edema.   Skin: Inspection and palpation: no jaundice.      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 Patient febrile in office. WBC elevated to 21.9. History of pneumonia post operatively, O2 sats stable.   - CT C/A/P ordered  - Patient was ultimately sent to ER due to leukocytosis, fever, nausea and vomiting for additional work up to identify source of infection and IV fluids. She understood and agreed.      Follow Up:  Pending ER visit       Electronically Signed by: JAIMIE Fry

## 2024-05-30 NOTE — ED QUICK NOTES
Orders for admission, patient is aware of plan and ready to go upstairs. Any questions, please call ED RN Mary RAMIREZ at extension [phone].     Patient Covid vaccination status: Fully vaccinated     COVID Test Ordered in ED: None    COVID Suspicion at Admission: N/A    Running Infusions:  NS 0.9 at 125ml/hr maintenance    Mental Status/LOC at time of transport: AOx4    Other pertinent information: Post surgical for cancer removal; perwick; straight cathed in ED    CIWA score: N/A   NIH score:  N/A

## 2024-05-30 NOTE — ED QUICK NOTES
RN Station report received from [facility]. Patient is awaiting results at this time. Plan of care reviewed.

## 2024-05-30 NOTE — ED INITIAL ASSESSMENT (HPI)
Pt c/o N/V and chills that began about 2 hrs pta. Pt states she has pain at her surgical site,had sarcoma removed from pelvic area 5/10. Pt states she has had constipation since the surgery. Last BM 2 days ago. States she had elevated WBC count \"last time\"

## 2024-05-30 NOTE — ED INITIAL ASSESSMENT (HPI)
Pt to ER via wheelchair. States had 5 minute episode yesterday of SOB that resolved. Yesterday had a fever 99.4 yesterday evening with this  (last took Tylenol 0300 this AM). States has hx of pelvic cancer but has been cancer free. States emesis 6-10 since yesterday with diarrhea x1. Denies chills. Generalized weakness and fatigue.states 6 nights in hospital last month r/t sarcoma removal

## 2024-05-30 NOTE — ED PROVIDER NOTES
Patient Seen in: [facility] Emergency Department      History     Chief Complaint   Patient presents with    Fever    Dehydration    Fever     Stated Complaint: fever and vomiting last night- brought over from surgical oncology    Subjective:   HPI    This is a 63-year-old female presents with fever and abdominal pain with past medical history of sarcoma of the pelvic peritoneum.  Patient underwent radical resection of a sarcoma of the left pelvis with resection of the left internal iliac artery and vein on 5/10/2024 by Dr. Mary.  She was admitted 5/10 through 2024.  Patient states that she started feeling ill yesterday.  She had a temperature of 99.  She developed some lower abdominal pain in the left side.  Tmax today of 101 °F at around 930 this morning.  She denies any cough.  She has been vomiting.  She states she had 6-8 episodes of vomiting over the last 24 hours and diarrhea once today.  No dysuria.  She has some left thigh pain which she had preoperatively which they thought was some compression of her nerves from the pelvic mass.  That thigh pain has recurred.  She presents from home for further evaluation.            Objective:   Past Medical History:    Cancer (HCC)    Disorder of thyroid    Exposure to medical diagnostic radiation    last tx 2024    High blood pressure    Hx of motion sickness    Visual impairment    glasses              Past Surgical History:   Procedure Laterality Date    Cholecystectomy      Hysterectomy      Mastectomy left      Other surgical history      left hemilaminectomy L3-L4                Social History     Socioeconomic History    Marital status:    Tobacco Use    Smoking status: Former     Current packs/day: 0.00     Types: Cigarettes     Quit date:      Years since quittin.4    Smokeless tobacco: Never   Vaping Use    Vaping status: Never Used   Substance and Sexual Activity    Alcohol use: Not Currently    Drug use: Not Currently      Types: Cannabis     Social Determinants of Health     Food Insecurity: No Food Insecurity (5/30/2024)    Food Insecurity     Food Insecurity: Never true   Transportation Needs: No Transportation Needs (5/30/2024)    Transportation Needs     Lack of Transportation: No   Housing Stability: Low Risk  (5/30/2024)    Housing Stability     Housing Instability: No              Review of Systems    Positive for stated complaint: fever and vomiting last night- brought over from surgical oncology  Other systems are as noted in HPI.  Constitutional and vital signs reviewed.      All other systems reviewed and negative except as noted above.    Physical Exam     ED Triage Vitals   BP 05/30/24 1337 131/80   Pulse 05/30/24 1337 97   Resp 05/30/24 1337 18   Temp 05/30/24 1545 99 °F (37.2 °C)   Temp src 05/30/24 1545 Oral   SpO2 05/30/24 1338 97 %   O2 Device 05/30/24 1400 None (Room air)       Current Vitals:   Vital Signs  BP: 118/84  Pulse: 75  Resp: 14  Temp: 98.9 °F (37.2 °C)  Temp src: Oral  MAP (mmHg): 95    Oxygen Therapy  SpO2: 94 %  O2 Device: None (Room air)  Pulse Oximetry Type: Continuous  Oximetry Probe Site Changed: Yes  Pulse Ox Probe Location: Right hand            Physical Exam    GENERAL: Awake, alert oriented x3, nontoxic appearing.   SKIN: Normal, warm, and dry.  HEENT:  Pupils equally round and reactive to light. Conjuctiva clear.  Oropharynx is clear and moist.   Lungs: Clear to auscultation bilaterally with no rales, no retractions, and no wheezing.  HEART:  Regular rate and rhythm. S1 and S2. No murmurs, no rubs or gallops.   ABDOMEN: Soft, tender in the left lower quadrant and left flank area.   midline incision is clean dry and intact.. Normoactive bowel sounds. No rebound. No guarding.   EXTREMITIES: Warm with brisk capillary refill.         ED Course     Labs Reviewed   COMP METABOLIC PANEL (14) - Abnormal; Notable for the following components:       Result Value    Glucose 132 (*)     Potassium 3.4 (*)      Calcium, Total 10.5 (*)     Albumin 3.1 (*)     A/G Ratio 0.7 (*)     All other components within normal limits   LACTIC ACID, PLASMA - Abnormal; Notable for the following components:    Lactic Acid 2.5 (*)     All other components within normal limits   PROCALCITONIN - Abnormal; Notable for the following components:    Procalcitonin 27.00 (*)     All other components within normal limits    Narrative:     Resulted by: batch: K, CO2, CL, NA, ALB, TBIL, ALT, AST, ALP, CA, CREA, BUN, GLUC,    URINALYSIS WITH CULTURE REFLEX - Abnormal; Notable for the following components:    Clarity Urine Turbid (*)     Ketones Urine Trace (*)     Blood Urine 1+ (*)     Protein Urine 30 (*)     Leukocyte Esterase Urine 25 (*)     RBC Urine >10 (*)     Squamous Epi. Cells Few (*)     All other components within normal limits   CBC W/ DIFFERENTIAL - Abnormal; Notable for the following components:    WBC 21.0 (*)     Neutrophil Absolute Prelim 19.23 (*)     Neutrophil Absolute 19.23 (*)     All other components within normal limits   LACTIC ACID REFLEX POST POSTIVE - Normal   CBC WITH DIFFERENTIAL WITH PLATELET    Narrative:     The following orders were created for panel order CBC With Differential With Platelet.  Procedure                               Abnormality         Status                     ---------                               -----------         ------                     CBC W/ DIFFERENTIAL[[id number]]          Abnormal            Final result                 Please view results for these tests on the individual orders.   SCAN SLIDE   RAINBOW DRAW LAVENDER   RAINBOW DRAW LIGHT GREEN   RAINBOW DRAW BLUE   RAINBOW DRAW GOLD   BLOOD CULTURE   BLOOD CULTURE   URINE CULTURE, ROUTINE     EKG    Rate, intervals and axes as noted on EKG Report.  Rate: 90  Rhythm: Sinus Rhythm  Reading: No acute changes.  Poor R wave progression.               CT ABDOMEN+PELVIS(CPT=74176)    Result Date: 5/30/2024  PROCEDURE:  CT ABDOMEN+PELVIS  (CPT=74176)  COMPARISON:  [facility] , CT, CT CHEST+ABDOMEN+PELVIS(CPT=71250/01766), 5/30/2024, 12:07 PM.  INDICATIONS:  fever and vomiting last night- brought over from surgical oncology  TECHNIQUE:  Unenhanced multislice CT scanning was performed from the dome of the diaphragm to the pubic symphysis.  Dose reduction techniques were used. Dose information is transmitted to the ACR (American College of Radiology) NRDR (National Radiology Data Registry) which includes the Dose Index Registry.  PATIENT STATED HISTORY: (As transcribed by Technologist)   Fever and vomiting last night. Patient brought over from surgical oncology.    FINDINGS:    LIVER:  Unremarkable.  BILIARY:  Cholecystectomy  PANCREAS:  Unremarkable.  SPLEEN:  Unremarkable.  KIDNEYS:  New mild left hydronephrosis and hydroureter, no likely secondary to the left ureter being compromise as it courses along/through previously demonstrated postsurgical fluid collection and tissue thickening in the left pelvis adjacent to multiple surgical clips as shown on CT performed a few hours earlier.  No kidney stone identified.  The right kidney shows no hydronephrosis.  ADRENALS:  Unremarkable.  AORTA/VASCULAR:  No aortic aneurysm.  RETROPERITONEUM:  Unremarkable.  BOWEL/MESENTERY:  No acute process.  Moderate amount of stool throughout the colon.  No sign of colitis, diverticulitis, bowel obstruction, free air, ascites, mesenteric adenopathy, mesenteric edema.  ABDOMINAL WALL:  Unremarkable.  URINARY BLADDER:  Limited assessment of the urinary bladder which contains only a small amount of urine at the time of scanning, but based on the appearance on this examination, no specific bladder abnormalities identified.   LYMPH NODES PELVIS:  Unremarkable.  PELVIC ORGANS:  Hysterectomy.  Stable appearing complex postsurgical collection and tissue thickening adjacent to surgical clips in the left pelvis unchanged from the earlier CT from today.  LUNG BASES:  No acute process.   BONES:  No acute abnormality.              CONCLUSION:  Compared with the prior CT performed just a few hours ago, there is now left-sided hydronephrosis, and hydroureter, mild, likely secondary to compromise of the left ureter as it courses adjacent to or through the previously demonstrated and stable appearing postsurgical left pelvic complex fluid collection/tissue thickening and adjacent surgical clips.  Consider urology evaluation.  Otherwise, no major change since the prior.   LOCATION:  [id number]   Dictated by (CST): Jersey Barclay MD on 5/30/2024 at 4:39 PM     Finalized by (CST): Jersey Barclay MD on 5/30/2024 at 4:44 PM       XR CHEST AP PORTABLE  (CPT=71045)    Result Date: 5/30/2024  PROCEDURE:  XR CHEST AP PORTABLE  (CPT=71045)  TECHNIQUE:  AP chest radiograph was obtained.  COMPARISON:  [facility] , XR, XR CHEST AP PORTABLE  (CPT=71045), 5/12/2024, 5:05 PM.  INDICATIONS:  fever and vomiting last night- brought over from surgical oncology  PATIENT STATED HISTORY: (As transcribed by Technologist)    FINDINGS:  The heart is normal in size.  The lungs are clear of acute-appearing disease process.  The costophrenic angles are sharp.  There is no active disease seen on the basis of portable chest radiography.            CONCLUSION:  No active disease seen.  LOCATION:  [id number]      Dictated by ([facility]): Jersey Barclay MD on 5/30/2024 at 2:38 PM     Finalized by (CST): Jersey Barclay MD on 5/30/2024 at 2:38 PM       CT CHEST+ABDOMEN+PELVIS(CPT=71250/95046)    Result Date: 5/30/2024  PROCEDURE:  CT CHEST+ABDOMEN+PELVIS(CPT=71250/63872)  COMPARISON:  [facility] , XR, XR ABDOMEN (1 VIEW) (CPT=74018), 5/14/2024, 8:09 AM.  [facility] , CT, CT ABDOMEN+PELVIS(CPT=74176), 5/14/2024, 10:36 AM.  INDICATIONS:  D72.829 Leukocytosis, unspecified type M79.89 Mass of soft tissue of pelvis  TECHNIQUE:  Following oral contrast administration, unenhanced multislice CT scanning is performed through the chest, abdomen, and pelvis.  Dose  reduction techniques were used. Dose information is transmitted to the ACR (American College of Radiology) NRDR (National Radiology Data Registry) which includes the Dose Index Registry.  PATIENT STATED HISTORY: (As transcribed by Technologist)  Patient presents with nausea, vomiting, and difficulty breathing. Recent history of pneumonia. Hx mass of soft tissue Pelvis    FINDINGS:  Sensitivity decreased without IV or oral contrast.  CHEST:  LUNGS:  Decreasing opacities at both lung bases. MEDIASTINUM:  No adenopathy. RICK:  No adenopathy. CARDIAC:  No pericardial effusion.  Coronary calcifications are present. PLEURA:  No pleural effusion. CHEST WALL:  No axillary adenopathy.  Left mastectomy changes. AORTA:  Tortuosity and ectasia.  Ascending diameter 3.8 cm.  Corresponding descending diameter 2.5 cm. VASCULATURE:  Smooth tapering.  ABDOMEN/PELVIS: LIVER:  Uniform parenchyma. BILIARY:  Surgically absent gallbladder.  No biliary dilatation. PANCREAS:  Uniform parenchyma.  No ductal dilatation. SPLEEN:  Not enlarged. KIDNEYS:  Normal anatomic positions.  No hydronephrosis.  No renal calculus disease. ADRENALS:  Nodularity bilaterally, without a discrete nodule. AORTA:  No abdominal aortic aneurysm.  Mild calcified atherosclerosis. RETROPERITONEUM:  No adenopathy.  A few nonenlarged lymph nodes appear similar to the prior. BOWEL/MESENTERY:  Normal bowel caliber.  Redundant colon.  No new colonic inflammation.  No free air.  No generalized ascites. ABDOMINAL WALL:  Midline laparotomy changes.  No fluid collection or hematoma.   A few drops of subcutaneous air in the left lower quadrant. URINARY BLADDER:  Nondistended. PELVIC NODES:  None enlarged. PELVIC ORGANS:  Surgically absent uterus.  There is a focal fluid collection adjacent to surgical clips in the posterior left pelvis.  5.2 x 3.4 cm.  No gas within.  Mild edema edema within the pelvis, increased. BONES:  Scattered, overall moderate degenerative changes.   Maintained vertebral body heights.  No subluxation.             CONCLUSION:  1. There is a focal fluid collection adjacent to surgical clips in the left posterior pelvis.  This may be a lymphocele or seroma.  An infected collection would be difficult to exclude, however there is no air within or other more specific features of abscess. 2. Improved aeration to the lung bases.  Mild residual atelectasis.  The potential for pneumonitis should be correlated with clinical suspicion for infection. 3. Details as above.  Continued clinical correlation recommended.  Report called by the radiology staff.      LOCATION:  [facility]    Dictated by (CST): Macho Menjivar MD on 5/30/2024 at 1:07 PM     Finalized by (CST): Macho Menjivar MD on 5/30/2024 at 1:18 PM                        MDM        Admission disposition: 5/30/2024  7:15 PM       This is a 63-year-old female presents with fever and abdominal pain with past medical history of sarcoma of the pelvic peritoneum.  Patient underwent radical resection of a sarcoma of the left pelvis with resection of the left internal iliac artery and vein on 5/10/2024 by Dr. Mary.  She was admitted 5/10 through 5/16/2024.  Patient states that she started feeling ill yesterday.  She had a temperature of 99.  She developed some lower abdominal pain in the left side.  Tmax today of 101 °F at around 930 this morning.  Differential includes intra-abdominal abscess, bowel obstruction, bowel perforation.      IV line was established of normal saline.  She was given a 1 L bolus.  Basic labs were obtained.  CBC: White blood cell count 21.0.  Hemoglobin 12.4.  Platelet 397.  CMP: BUN 9.  Creatinine 0.8.  Glucose 132.  Bicarb 22.  Lactic acid slightly elevated at 2.5.  Procalcitonin 27.  Blood culture sent x 2 and pending.    Zosyn given empirically.    Straight cath UA: Nitrate negative.  Leuk esterase 25.  1-5 WBC.  Greater than 10 RBCs.  No bacteria.      I independently viewed the chest x-ray which showed  no evidence of active pneumonia or infiltrates.  Also reviewed the radiology interpretation and in agreement.      CT abdomen/pelvis was obtained and demonstrated a left-sided hydronephrosis/hydroureter most likely from compression of the postop fluid collection.      I discussed case with Dr. Mary who request urology consult.  He does not feel it is related to the abdominal mass.  Still awaiting urine results at this time.    Urinalysis: Turbid, trace ketones positive for blood.  Nitrite negative.  25 leukocytes.  WBCs 1-5.  RBCs greater than 10.    Dr. Mary notified that the urinalysis was negative.    Discussed case with urology Dr. Javier agrees with Lana and will follow-up.  Possible stent to come tomorrow.      Patient and family aware of plan.  All questions answered.  Admitted for further workup.    Case discussed with [facility] hospitalist Dr. Sarmiento    Disposition and Plan     Clinical Impression:  1. Abdominal pain, acute    2. Leukocytosis, unspecified type    3. Hydronephrosis, left    4. Sarcoma (HCC)         Disposition:  Admit  5/30/2024  7:15 pm    Follow-up:  No follow-up provider specified.        Medications Prescribed:  Current Discharge Medication List                            Hospital Problems       Present on Admission  Date Reviewed: 5/30/2024            ICD-10-CM Noted POA    * (Principal) Abdominal pain, acute R10.9 5/30/2024 Unknown    Leukocytosis, unspecified type D72.829 5/30/2024 Unknown

## 2024-05-31 PROBLEM — N13.30 HYDRONEPHROSIS: Status: ACTIVE | Noted: 2024-05-31

## 2024-05-31 PROBLEM — N30.00 ACUTE CYSTITIS WITHOUT HEMATURIA: Status: ACTIVE | Noted: 2024-05-31

## 2024-05-31 LAB
ALBUMIN SERPL-MCNC: 2.6 G/DL (ref 3.4–5)
ALBUMIN/GLOB SERPL: 0.7 {RATIO} (ref 1–2)
ALP LIVER SERPL-CCNC: 128 U/L
ALT SERPL-CCNC: 25 U/L
ANION GAP SERPL CALC-SCNC: 8 MMOL/L (ref 0–18)
AST SERPL-CCNC: 28 U/L (ref 15–37)
BASOPHILS # BLD AUTO: 0.04 X10(3) UL (ref 0–0.2)
BASOPHILS NFR BLD AUTO: 0.5 %
BILIRUB SERPL-MCNC: 0.4 MG/DL (ref 0.1–2)
BUN BLD-MCNC: 8 MG/DL (ref 9–23)
CALCIUM BLD-MCNC: 9.6 MG/DL (ref 8.5–10.1)
CHLORIDE SERPL-SCNC: 110 MMOL/L (ref 98–112)
CO2 SERPL-SCNC: 22 MMOL/L (ref 21–32)
CREAT BLD-MCNC: 0.62 MG/DL
EGFRCR SERPLBLD CKD-EPI 2021: 101 ML/MIN/1.73M2 (ref 60–?)
EOSINOPHIL # BLD AUTO: 0.19 X10(3) UL (ref 0–0.7)
EOSINOPHIL NFR BLD AUTO: 2.2 %
ERYTHROCYTE [DISTWIDTH] IN BLOOD BY AUTOMATED COUNT: 13.2 %
GLOBULIN PLAS-MCNC: 4 G/DL (ref 2.8–4.4)
GLUCOSE BLD-MCNC: 103 MG/DL (ref 70–99)
GLUCOSE BLD-MCNC: 105 MG/DL (ref 70–99)
GLUCOSE BLD-MCNC: 110 MG/DL (ref 70–99)
HCT VFR BLD AUTO: 33.2 %
HGB BLD-MCNC: 10.7 G/DL
IMM GRANULOCYTES # BLD AUTO: 0.03 X10(3) UL (ref 0–1)
IMM GRANULOCYTES NFR BLD: 0.4 %
LYMPHOCYTES # BLD AUTO: 1.09 X10(3) UL (ref 1–4)
LYMPHOCYTES NFR BLD AUTO: 12.8 %
MAGNESIUM SERPL-MCNC: 2.2 MG/DL (ref 1.6–2.6)
MCH RBC QN AUTO: 29.7 PG (ref 26–34)
MCHC RBC AUTO-ENTMCNC: 32.2 G/DL (ref 31–37)
MCV RBC AUTO: 92.2 FL
MONOCYTES # BLD AUTO: 0.49 X10(3) UL (ref 0.1–1)
MONOCYTES NFR BLD AUTO: 5.8 %
NEUTROPHILS # BLD AUTO: 6.65 X10 (3) UL (ref 1.5–7.7)
NEUTROPHILS # BLD AUTO: 6.65 X10(3) UL (ref 1.5–7.7)
NEUTROPHILS NFR BLD AUTO: 78.3 %
OSMOLALITY SERPL CALC.SUM OF ELEC: 289 MOSM/KG (ref 275–295)
PLATELET # BLD AUTO: 311 10(3)UL (ref 150–450)
POTASSIUM SERPL-SCNC: 3.2 MMOL/L (ref 3.5–5.1)
PROT SERPL-MCNC: 6.6 G/DL (ref 6.4–8.2)
RBC # BLD AUTO: 3.6 X10(6)UL
SODIUM SERPL-SCNC: 140 MMOL/L (ref 136–145)
WBC # BLD AUTO: 8.5 X10(3) UL (ref 4–11)

## 2024-05-31 PROCEDURE — 99223 1ST HOSP IP/OBS HIGH 75: CPT | Performed by: INTERNAL MEDICINE

## 2024-05-31 RX ORDER — POTASSIUM CHLORIDE 20 MEQ/1
40 TABLET, EXTENDED RELEASE ORAL ONCE
Status: COMPLETED | OUTPATIENT
Start: 2024-05-31 | End: 2024-05-31

## 2024-05-31 NOTE — PLAN OF CARE
NURSING ADMISSION NOTE      Patient admitted via Cart around 2100  Oriented to room.  Alert and oriented x4   NSR on tele and on RA, VSS  Admission navigator completed   Pain in leg, dilaudid PO given, see MAR  Up w/ 1 and RW   Denies any nausea/SOB  Currently NPO (sips w/ meds) per order   IVF infusing per order  Call light w/in reach

## 2024-05-31 NOTE — CONSULTS
[facility]        Patient Name:  Citlali Leung   YOB: 1960   Gender:  Female   Appt Date:  5/30/2024   Provider:  Christian Mary MD     PATIENT PROVIDERS  Referring Provider: Diana Dickey MD     Primary Care Provider:Janel Hendrickson          CHIEF COMPLAINT  Post op fever     PROBLEMS  Reviewed   Patient Active Problem List   Diagnosis    Acute sinusitis, recurrence not specified, unspecified location    Arthritis    Bronchitis    Blepharitis of right lower eyelid    Breast pain, right    Other chronic pain    Chronic midline low back pain with left-sided sciatica    Cervical radiculopathy    Hypertension due to endocrine disorder    Hypokalemia    Insomnia    Left lumbar radiculopathy    Sarcoma of pelvic peritoneum (HCC)    Fibromyalgia    History of left breast cancer    Hyperlipidemia, unspecified hyperlipidemia type    Hypothyroidism, unspecified type    VANDA (generalized anxiety disorder)    Age-related osteoporosis without current pathological fracture    Abnormal EKG    COPD with acute exacerbation (HCC)    Diarrhea of presumed infectious origin    Disorder of thyroid    Generalized weakness    Hordeolum externum of right lower eyelid    Lumbar facet arthropathy    Osteopenia    Primary hyperparathyroidism (HCC)    Hypertension    Productive cough    Simple chronic bronchitis (HCC)    Vitamin D deficiency    Abdominal pain, acute    Leukocytosis, unspecified type        History of Present Illness:  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    History:  Patient is a 63 year old woman who was referred for consideration of surgical oncology management of recently diagnosed leiomyosarcoma.  Patient has history of breast cancer in 2003, s/p surgery, RT, chemo and 5 years of tamoxifen.     11/14/2023: Patient began  having left lower abdominal pain with nausea and vomiting.  She went to the ED in November where CT abdomen pelvis was performed showing irregular soft tissue mass in the posterior left pelvis measuring 2.5 x 4.3 cm.  Mass is closely associated with the left internal iliac vessels and left S2 nerve root.     11/15/2023: MRI performed showing 3.0 x 4.2 x 3.6 cm heterogenously enhancing mass inseparable from the left internal iliac vessels and encasing left exiting S2 nerve root.   CA-125: 33.0  CEA: 2.5  CA 19-9: 4.6  CA 27.29: 11  CA 15-3: 8    11/16/2023: CT chest with small right upper lobe nodule.  No comparison.      11/17/2023: Patient underwent CT-guided biopsy of left pelvic mass --> spindle cell neoplasm with features of leiomyoma with atypical nuclei, atypical smooth muscle neoplasm, MSI stable 7     11/21/2023: Repeat CT abdomen pelvis with IV contrast confirms enhancing solid mass in left posterior pelvis measuring 4.3 cm concerning for neoplasm     12/1/2023: Repeat CT abdomen pelvis redemonstrating solid mass, no significant change from prior     12/3/2023: CT brain without contrast performed due to vomiting, WNL     12/15/2023: Patient seen by Dr Jamil [facility] and Dr Horton [facility], tumor deemed unresectable, ER/OR negative. Recommended ablative dose proton therapy     12/18/2023: Re-admitted for N/V. Repeat CT A/P, stable mass  12/21/2023: EGD performed while admitted showing peptic appearing duodenitis and gastritis, and gallego's esophagus     Patient underwent radiation to the mass with Dr Gibson for 6 weeks which was finished on 2/26/2024. She underwent post radiation imaging, summarized below. She has been having increased constipation. She has occasional numbness in the left leg. She is able to walk but develops significant pain after walking 1.5 blocks.     5/10/2024: s/p radical resection sarcoma left pelvis with resection of left internal iliac artery and vein. Exclusion of small  bowel with omentum. Use of Plasma Jet to potentiate posterior margin, intraoperative neuro monitoring     5/29/2024: Called patient to set up post op appointment as she did not come to office on Tuesday due to constipation. Patient denied fevers or chills at the time. Pain was slightly increased but maintained. Denied nausea or vomiting. Scheduled for appointment on 5/30. Patient then reported to the ED late at night due to feeling feverish and chills. Spouse states temperature was less than 100. Patient left ED due to pain.      5/30/2024: Patient seen in clinic and sent to ED.  Patient statesd new onset nausea, vomiting and fever started 5/29 at 9:30 at night. She reported increased left leg pain. She states she vomited 2-3 times, she is unsure of the color. She states she had fever of 101 at home and felt feverish. She did note an episode of shortness of breath that improved after using her inhaler.  She has had no additional episodes of vomiting. She had a loose bowel movement this morning but has been dealing with constipation earlier in the week.     UA is unremarkable. CT abdomen/pelvis was obtained and demonstrated Left-sided hydronephrosis/hydroureter.      Vital Signs:  /82 (BP Location: Right arm)   Pulse 71   Temp 97.7 °F (36.5 °C) (Temporal)   Resp 15   Ht 1.575 m (5' 2\")   Wt 85.1 kg (187 lb 9.8 oz)   SpO2 90%   BMI 34.31 kg/m²      Medications Reviewed:  No current outpatient medications on file.     Allergies Reviewed:  Allergies   Allergen Reactions    Naproxen OTHER (SEE COMMENTS)    Omeprazole HIVES and ITCHING    Ondansetron OTHER (SEE COMMENTS)     Patient states she can take without difficulty      Radiology Contrast Iodinated Dyes ITCHING     DYE from CT Scan causes itching per Patient.    CT contrast, per patient CT was stopped and needed Benadryl during procedure. C/O itching   DYE from CT Scan causes itching per Patient.    Iodine ITCHING        History:  Reviewed:  Past  Medical History:    Cancer (HCC)    Disorder of thyroid    Exposure to medical diagnostic radiation    last tx 2024    High blood pressure    Hx of motion sickness    Visual impairment    glasses      Reviewed:  Past Surgical History:   Procedure Laterality Date    Cholecystectomy      Hysterectomy      Mastectomy left      Other surgical history      left hemilaminectomy L3-L4      Reviewed Social History:  Social History     Socioeconomic History    Marital status:    Tobacco Use    Smoking status: Former     Current packs/day: 0.00     Types: Cigarettes     Quit date:      Years since quittin.4    Smokeless tobacco: Never   Vaping Use    Vaping status: Never Used   Substance and Sexual Activity    Alcohol use: Not Currently    Drug use: Not Currently     Types: Cannabis     Social Determinants of Health     Food Insecurity: No Food Insecurity (2024)    Food Insecurity     Food Insecurity: Never true   Transportation Needs: No Transportation Needs (2024)    Transportation Needs     Lack of Transportation: No   Housing Stability: Low Risk  (2024)    Housing Stability     Housing Instability: No      Reviewed:  Family History   Problem Relation Age of Onset    Breast Cancer Daughter     Other (Colorectal Cancer) Son     Breast Cancer Maternal Aunt     Breast Cancer Maternal Aunt     Breast Cancer Maternal Aunt       Review of Systems:  GENERAL HEALTH: no fevers/chills   GI: Pain; denies nausea, vomiting, constipation, diarrhea; no rectal bleeding  GENITAL/: no blood in urine       Physical Examination:  Constitutional: NAD.   Eyes: Sclera: non-icteric.  Lymph Nodes: Lymph Nodes no cervical LAD, supraclavicular LAD, axillary LAD, or inguinal LAD.   Lungs: No increased work of breathing.   Abdomen: Soft, nontender, nondistended.  Incision healing well without drainage or erythema.  Musculoskeletal: No back or flank tenderness.  Skin: Inspection and palpation: no jaundice.       Document Review:  Lab Results   Component Value Date    COLORUR Yellow 05/30/2024    CLARITY Turbid 05/30/2024    SPECGRAVITY 1.029 05/30/2024    GLUUR Normal 05/30/2024    BILUR Negative 05/30/2024    KETUR Trace 05/30/2024    BLOODURINE 1+ 05/30/2024    PHURINE 6.0 05/30/2024    PROUR 30 05/30/2024    UROBILINOGEN Normal 05/30/2024    NITRITE Negative 05/30/2024    LEUUR 25 05/30/2024      Lab Results   Component Value Date    WBC 8.5 05/31/2024    HGB 10.7 05/31/2024    HCT 33.2 05/31/2024    .0 05/31/2024    CREATSERUM 0.80 05/30/2024    BUN 9 05/30/2024     05/30/2024    K 3.4 05/30/2024     05/30/2024    CO2 22.0 05/30/2024     05/30/2024    CA 10.5 05/30/2024    ALB 3.1 05/30/2024    ALKPHO 126 05/30/2024    BILT 0.5 05/30/2024    TP 7.5 05/30/2024    AST 16 05/30/2024    ALT 16 05/30/2024      CT: Reviewed and discussed.  Postoperative fluid collection without abscess formation.  Left hydro developing.  I agree with official report.         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  -Developing left hydro.  Dr. Javier consulted.  Likely from postoperative fluid collection.  -Okay to resume diet.  -Patient on empiric zosyn---> leukocytosis resolved.  -Awaiting blood cultures  -There are no acute surgical issues.              Electronically Signed by:   Christian Mary MD

## 2024-05-31 NOTE — H&P
[facility]     Citlali Leung Patient Status:  Inpatient    1960 MRN [id number]   Location [facility] 7NE-A Attending Lin Seymour MD   Hosp Day # 1 PCP Janel Montenegrokwube     Chief Complaint: fever     Subjective:    History of Present Illness:     Citlali Leung is a 63 year old female with recent radical resection of sarcoma left pelvis with resection of left internal iliac artery and vein with Dr. Goddard on 5/10/24 presented to the emergency room with ongoing fever at home.  Per patient since last week she was having low-grade temperature of 99.4.  She noted that yesterday before coming into the ER her temperature was as high as 101.  She had some nausea and vomiting but no abdominal pain, dysuria, back pain, chest pain or shortness of breath, cough.    History/Other:    Past Medical History:  Past Medical History:    Cancer (HCC)    Disorder of thyroid    Exposure to medical diagnostic radiation    last tx 2024    High blood pressure    Hx of motion sickness    Visual impairment    glasses     Past Surgical History:   Past Surgical History:   Procedure Laterality Date    Cholecystectomy      Hysterectomy      Mastectomy left      Other surgical history      left hemilaminectomy L3-L4      Family History:   Family History   Problem Relation Age of Onset    Breast Cancer Daughter     Other (Colorectal Cancer) Son     Breast Cancer Maternal Aunt     Breast Cancer Maternal Aunt     Breast Cancer Maternal Aunt      Social History:    reports that she quit smoking about 19 years ago. Her smoking use included cigarettes. She has never used smokeless tobacco. She reports that she does not currently use alcohol. She reports that she does not currently use drugs after having used the following drugs: Cannabis.     Allergies:   Allergies   Allergen Reactions    Naproxen OTHER (SEE COMMENTS)    Omeprazole HIVES and ITCHING    Ondansetron OTHER (SEE COMMENTS)     Patient  states she can take without difficulty      Radiology Contrast Iodinated Dyes ITCHING     DYE from CT Scan causes itching per Patient.    CT contrast, per patient CT was stopped and needed Benadryl during procedure. C/O itching   DYE from CT Scan causes itching per Patient.    Iodine ITCHING       Medications:    Current Facility-Administered Medications on File Prior to Encounter   Medication Dose Route Frequency Provider Last Rate Last Admin    [COMPLETED] potassium chloride (Klor-Con M20) tab 40 mEq  40 mEq Oral Q4H Blane Henderson MD   40 mEq at 05/15/24 1339    [COMPLETED] potassium chloride (Klor-Con M20) tab 20 mEq  20 mEq Oral Once Christian Mary MD   20 mEq at 24 1124    [COMPLETED] potassium chloride (Klor-Con M20) tab 40 mEq  40 mEq Oral Q4H Terrance Velazquez MD   40 mEq at 24 1300    [] potassium chloride (Klor-Con M20) tab 40 mEq  40 mEq Oral Q4H Terrance Velazquez MD   40 mEq at 24 2313    [COMPLETED] heparin (Porcine) 5000 UNIT/ML injection 5,000 Units  5,000 Units Subcutaneous Once Christian Mary MD   5,000 Units at 05/10/24 0654    [COMPLETED] ceFAZolin (Ancef) 2 g in 20mL IV syringe premix  2 g Intravenous Once Christian Mary MD   2 g at 05/10/24 1133    [COMPLETED] iopamidol 76% (ISOVUE-370) injection for power injector  80 mL Intravenous ONCE PRN Kathryn Henderson PA   80 mL at 24 1404    [COMPLETED] gadoterate meglumine (Dotarem) 10 MMOL/20ML injection 20 mL  20 mL Intravenous ONCE PRN Kathryn Hendreson PA   20 mL at 24 1522    [COMPLETED] gadoterate meglumine (Dotarem) 10 MMOL/20ML injection 20 mL  20 mL Intravenous ONCE PRN Kathryn Henderson PA   20 mL at 24 1631     Current Outpatient Medications on File Prior to Encounter   Medication Sig Dispense Refill    enoxaparin 40 MG/0.4ML Injection Solution Prefilled Syringe Inject 0.4 mL (40 mg total) into the skin daily. 22 each 0    levothyroxine 175 MCG Oral Tab Take 1 tablet (175 mcg total) by mouth before  breakfast.      amLODIPine 2.5 MG Oral Tab Take 2 tablets (5 mg total) by mouth every morning.      amLODIPine 2.5 MG Oral Tab Take 1 tablet (2.5 mg total) by mouth at bedtime.      acetaminophen 325 MG Oral Tab Take 2 tablets (650 mg total) by mouth every 6 (six) hours as needed.      DULoxetine 60 MG Oral Cap DR Particles TAKE 1 CAPSULE BY MOUTH DAILY FOR CHRONIC MUSCLE OR BONE PAIN OR NERVE PAIN OR RECURRENT ANXIETY      gabapentin 300 MG Oral Cap Take 1 capsule (300 mg total) by mouth 3 (three) times daily.      hydrOXYzine 25 MG Oral Tab Take 1 tablet (25 mg total) by mouth 2 (two) times daily as needed for Anxiety.      ipratropium-albuterol 0.5-2.5 (3) MG/3ML Inhalation Solution Inhale 3 mL into the lungs every 6 (six) hours as needed.      telmisartan 80 MG Oral Tab Take 0.5-1 tablets (40-80 mg total) by mouth daily.      Budesonide-Formoterol Fumarate (SYMBICORT) 160-4.5 MCG/ACT Inhalation Aerosol Inhale 2 puffs into the lungs 2 (two) times daily.      HYDROmorphone 4 MG Oral Tab Take 1 tablet (4 mg total) by mouth every 4 (four) hours as needed for Pain.      fentaNYL 12 MCG/HR Transdermal Patch 72 Hr Place 1 patch onto the skin every 3 (three) days.      fentaNYL 25 MCG/HR Transdermal Patch 72 Hr Place 1 patch onto the skin every third day.      Naloxone HCl 4 MG/0.1ML Nasal Liquid SPRAY 1 SPRAY IN EACH NOSTRIL FOR OVERDOSE. MAY REPEAT EVERY 2-3 MINUTES IN ALTERNATING NOSTRILS UNTIL MEDICAL ASSISTANCE BECOMES AVAILABLE      ondansetron 4 MG Oral Tablet Dispersible DISSOLVE 1 TABLET ON THE TONGUE THREE TIMES DAILY FOR UP TO 10 DAYS AS NEEDED FOR NAUSEA OR VOMITING      [] amoxicillin clavulanate 875-125 MG Oral Tab Take 1 tablet by mouth 2 (two) times daily for 4 days. 8 tablet 0       Review of Systems:   A comprehensive review of systems was completed.    Pertinent positives and negatives noted in the HPI.    Objective:   Physical Exam:    /75 (BP Location: Right arm)   Pulse 75   Temp 99  °F (37.2 °C) (Oral)   Resp 15   Ht 5' 2\" (1.575 m)   Wt 187 lb 9.8 oz (85.1 kg)   SpO2 91%   BMI 34.31 kg/m²   General: No acute distress, Alert  Respiratory: No rhonchi, no wheezes  Cardiovascular: S1, S2. Regular rate and rhythm  Abdomen: Soft, Non-tender, non-distended, positive bowel sounds  Neuro: No new focal deficits  Extremities: No edema      Results:    Labs:      Labs Last 24 Hours:    Recent Labs   Lab 05/30/24  1116 05/30/24  1350 05/31/24  0604   RBC 4.29 4.11 3.60*   HGB 12.7 12.4 10.7*   HCT 39.3 36.7 33.2*   MCV 91.6 89.3 92.2   MCH 29.6 30.2 29.7   MCHC 32.3 33.8 32.2   RDW 13.2 13.2 13.2   NEPRELIM 20.12* 19.23* 6.65   WBC 21.9* 21.0* 8.5   .0 397.0 311.0       Recent Labs   Lab 05/30/24  1116 05/30/24  1350 05/31/24  0604   * 132* 105*   BUN 9 9 8*   CREATSERUM 0.89 0.80 0.62   EGFRCR 74 84 101   CA 10.8* 10.5* 9.6   ALB 3.0* 3.1* 2.6*    137 140   K 3.7 3.4* 3.2*    104 110   CO2 23.0 22.0 22.0   ALKPHO 127 126 128   AST 18 16 28   ALT 15 16 25   BILT 0.3 0.5 0.4   TP 7.4 7.5 6.6       No results found for: \"PT\", \"INR\"    No results for input(s): \"TROP\", \"TROPHS\", \"CK\" in the last 168 hours.    No results for input(s): \"TROP\", \"PBNP\" in the last 168 hours.    Recent Labs   Lab 05/30/24  1350   PCT 27.00*       Imaging: Imaging data reviewed in Epic.    Assessment & Plan:      # Febrile illness  - cultures taken in ER and penidng   - cont zosyn for now   - ID consulted   - Dr Usman kidd appreciated   - urology consulted     # mild hydronephrosis   - urology eval     # possible UTI   - cont abx     # hypokalemia , replace   # mild anemia , monitor   # hypercalcemia, IVF    # 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  #Leiomyosarcoma   #Essential HTN   - amlodipine, losartan     #Hypothyroidism   - Synthroid     #VANDA   - Duloxetine     #Peripheral neuropathy   - Gabapentin    #S/p Spine stimulator in place     #Chronic bronchitis          Plan of care discussed with patient and ER.    Lin Seymour MD    Supplementary Documentation:     The 21st Century Cures Act makes medical notes like these available to patients in the interest of transparency. Please be advised this is a medical document. Medical documents are intended to carry relevant information, facts as evident, and the clinical opinion of the practitioner. The medical note is intended as peer to peer communication and may appear blunt or direct. It is written in medical language and may contain abbreviations or verbiage that are unfamiliar.

## 2024-05-31 NOTE — CONSULTS
INFECTIOUS DISEASE CONSULTATION    Citlali Leung Patient Status:  Inpatient    1960 MRN [id number]   [facility] 7NE-A Attending Lin Seymour MD   Hosp Day # 1 PCP Janel Hendrickson       Requested by Dr. Seymour    Reason for Consultation:  Postop fever  UTI with hydro    History of Present Illness:  Citlali Leung is a a(n) 63 year old female diagnosed with left pelvic and retroperitoneal sarcoma, and underwent radical resection and left uretolysis on 5/10/24.   She came to ED on overnight  due to fever at home, 101 at home.  On arrival in ED temp 99 and tm since admission 99.1.   CT shows a fluid collection, and left sided hydronphrosis.       History:  Past Medical History:    Cancer (HCC)    Disorder of thyroid    Exposure to medical diagnostic radiation    last tx 2024    High blood pressure    Hx of motion sickness    Visual impairment    glasses     Past Surgical History:   Procedure Laterality Date    Cholecystectomy      Hysterectomy      Mastectomy left      Other surgical history      left hemilaminectomy L3-L4     Family History   Problem Relation Age of Onset    Breast Cancer Daughter     Other (Colorectal Cancer) Son     Breast Cancer Maternal Aunt     Breast Cancer Maternal Aunt     Breast Cancer Maternal Aunt       reports that she quit smoking about 19 years ago. Her smoking use included cigarettes. She has never used smokeless tobacco. She reports that she does not currently use alcohol. She reports that she does not currently use drugs after having used the following drugs: Cannabis.      Allergies:  Allergies   Allergen Reactions    Naproxen OTHER (SEE COMMENTS)    Omeprazole HIVES and ITCHING    Ondansetron OTHER (SEE COMMENTS)     Patient states she can take without difficulty      Radiology Contrast Iodinated Dyes ITCHING     DYE from CT Scan causes itching per Patient.    CT contrast, per patient CT was  stopped and needed Benadryl during procedure. C/O itching   DYE from CT Scan causes itching per Patient.    Iodine ITCHING       Medications:    Current Facility-Administered Medications:     potassium chloride (Klor-Con M20) tab 40 mEq, 40 mEq, Oral, Once    acetaminophen (Tylenol) tab 650 mg, 650 mg, Oral, Q6H PRN    amLODIPine (Norvasc) tab 2.5 mg, 2.5 mg, Oral, Nightly    amLODIPine (Norvasc) tab 5 mg, 5 mg, Oral, QAM    fluticasone furoate-vilanterol (Breo Ellipta) 200-25 MCG/ACT inhaler 1 puff, 1 puff, Inhalation, Daily    DULoxetine (Cymbalta) DR cap 60 mg, 60 mg, Oral, Daily    fentaNYL (Duragesic) 12 MCG/HR patch 1 patch, 1 patch, Transdermal, Q3 Days    fentaNYL (Duragesic) 25 MCG/HR patch 1 patch, 1 patch, Transdermal, Q3 Days    gabapentin (Neurontin) cap 300 mg, 300 mg, Oral, TID    HYDROmorphone (Dilaudid) tab 4 mg, 4 mg, Oral, Q4H PRN    hydrOXYzine (Atarax) tab 25 mg, 25 mg, Oral, BID PRN    ipratropium-albuterol (Duoneb) 0.5-2.5 (3) MG/3ML inhalation solution 3 mL, 3 mL, Nebulization, Q6H PRN    levothyroxine (Synthroid) tab 175 mcg, 175 mcg, Oral, Before breakfast    losartan (Cozaar) tab 100 mg, 100 mg, Oral, Daily    sodium chloride 0.9% infusion, , Intravenous, Continuous    heparin (Porcine) 5000 UNIT/ML injection 5,000 Units, 5,000 Units, Subcutaneous, Q8H ADELIA    acetaminophen (Tylenol Extra Strength) tab 500 mg, 500 mg, Oral, Q4H PRN    prochlorperazine (Compazine) 10 MG/2ML injection 5 mg, 5 mg, Intravenous, Q8H PRN    piperacillin-tazobactam (Zosyn) 3.375 g in dextrose 5% 100 mL IVPB-ADDV, 3.375 g, Intravenous, Q8H  Current Facility-Administered Medications on File Prior to Encounter   Medication Dose Route Frequency Provider Last Rate Last Admin    [COMPLETED] potassium chloride (Klor-Con M20) tab 40 mEq  40 mEq Oral Q4H Blane Henderson MD   40 mEq at 05/15/24 1339    [COMPLETED] potassium chloride (Klor-Con M20) tab 20 mEq  20 mEq Oral Once Christian Mary MD   20 mEq at 05/13/24 1124     [COMPLETED] potassium chloride (Klor-Con M20) tab 40 mEq  40 mEq Oral Q4H Terrance Velazquez MD   40 mEq at 24 1300    [] potassium chloride (Klor-Con M20) tab 40 mEq  40 mEq Oral Q4H Terrance Velazquez MD   40 mEq at 24 2313    [COMPLETED] heparin (Porcine) 5000 UNIT/ML injection 5,000 Units  5,000 Units Subcutaneous Once Christian Mary MD   5,000 Units at 05/10/24 0654    [COMPLETED] ceFAZolin (Ancef) 2 g in 20mL IV syringe premix  2 g Intravenous Once Christian Mary MD   2 g at 05/10/24 1133    [COMPLETED] iopamidol 76% (ISOVUE-370) injection for power injector  80 mL Intravenous ONCE PRN Kathryn Henderson PA   80 mL at 24 1404    [COMPLETED] gadoterate meglumine (Dotarem) 10 MMOL/20ML injection 20 mL  20 mL Intravenous ONCE PRN Kathryn Henderson PA   20 mL at 24 1522    [COMPLETED] gadoterate meglumine (Dotarem) 10 MMOL/20ML injection 20 mL  20 mL Intravenous ONCE PRN Kathryn Henderson PA   20 mL at 24 1631     Current Outpatient Medications on File Prior to Encounter   Medication Sig Dispense Refill    enoxaparin 40 MG/0.4ML Injection Solution Prefilled Syringe Inject 0.4 mL (40 mg total) into the skin daily. 22 each 0    levothyroxine 175 MCG Oral Tab Take 1 tablet (175 mcg total) by mouth before breakfast.      amLODIPine 2.5 MG Oral Tab Take 2 tablets (5 mg total) by mouth every morning.      amLODIPine 2.5 MG Oral Tab Take 1 tablet (2.5 mg total) by mouth at bedtime.      acetaminophen 325 MG Oral Tab Take 2 tablets (650 mg total) by mouth every 6 (six) hours as needed.      DULoxetine 60 MG Oral Cap DR Particles TAKE 1 CAPSULE BY MOUTH DAILY FOR CHRONIC MUSCLE OR BONE PAIN OR NERVE PAIN OR RECURRENT ANXIETY      gabapentin 300 MG Oral Cap Take 1 capsule (300 mg total) by mouth 3 (three) times daily.      hydrOXYzine 25 MG Oral Tab Take 1 tablet (25 mg total) by mouth 2 (two) times daily as needed for Anxiety.      ipratropium-albuterol 0.5-2.5 (3) MG/3ML Inhalation Solution  Inhale 3 mL into the lungs every 6 (six) hours as needed.      telmisartan 80 MG Oral Tab Take 0.5-1 tablets (40-80 mg total) by mouth daily.      Budesonide-Formoterol Fumarate (SYMBICORT) 160-4.5 MCG/ACT Inhalation Aerosol Inhale 2 puffs into the lungs 2 (two) times daily.      HYDROmorphone 4 MG Oral Tab Take 1 tablet (4 mg total) by mouth every 4 (four) hours as needed for Pain.      fentaNYL 12 MCG/HR Transdermal Patch 72 Hr Place 1 patch onto the skin every 3 (three) days.      fentaNYL 25 MCG/HR Transdermal Patch 72 Hr Place 1 patch onto the skin every third day.      Naloxone HCl 4 MG/0.1ML Nasal Liquid SPRAY 1 SPRAY IN EACH NOSTRIL FOR OVERDOSE. MAY REPEAT EVERY 2-3 MINUTES IN ALTERNATING NOSTRILS UNTIL MEDICAL ASSISTANCE BECOMES AVAILABLE      ondansetron 4 MG Oral Tablet Dispersible DISSOLVE 1 TABLET ON THE TONGUE THREE TIMES DAILY FOR UP TO 10 DAYS AS NEEDED FOR NAUSEA OR VOMITING      [] amoxicillin clavulanate 875-125 MG Oral Tab Take 1 tablet by mouth 2 (two) times daily for 4 days. 8 tablet 0       Review of Systems:    A comprehensive 10 point review of systems was completed.  Pertinent positives and negatives noted in the the HPI.      Physical Exam:    General: No acute distress. Alert and oriented x 3.  Vital signs: Temp:  [97.7 °F (36.5 °C)-100 °F (37.8 °C)] 99 °F (37.2 °C)  Pulse:  [71-99] 75  Resp:  [13-20] 15  BP: (106-143)/(75-91) 117/75  SpO2:  [90 %-97 %] 91 %  Body mass index is 34.31 kg/m².  HEENT: Moist mucous membranes. Extraocular muscles are intact.  Neck: No swelling, no masses  Respiratory: Non labored, symmetric exursion  Cardiovascular: No irregularities in rhythm  Abdomen: Soft, nontender, nondistended.    Musculoskeletal: Full range of motion of all extremities.  No swelling noted.  Joints: no effusions  Skin: No lesions. No erythema, no open wounds    Laboratory Data:  Laboratory data reviewed      Recent Labs   Lab 24  0604   RBC 3.60*   HGB 10.7*   HCT 33.2*    MCV 92.2   MCH 29.7   MCHC 32.2   RDW 13.2   NEPRELIM 6.65   WBC 8.5   .0       Recent Labs   Lab 05/30/24  1116 05/30/24  1350 05/31/24  0604   * 132* 105*   BUN 9 9 8*   CREATSERUM 0.89 0.80 0.62   CA 10.8* 10.5* 9.6   ALB 3.0* 3.1* 2.6*    137 140   K 3.7 3.4* 3.2*    104 110   CO2 23.0 22.0 22.0   ALKPHO 127 126 128   AST 18 16 28   ALT 15 16 25   BILT 0.3 0.5 0.4   TP 7.4 7.5 6.6         Lab Results   Component Value Date    MG 2.2 05/31/2024    PGLU 110 05/31/2024     Recent Labs   Lab 05/30/24  1737   COLORUR Yellow   CLARITY Turbid*   SPECGRAVITY 1.029   GLUUR Normal   BILUR Negative   KETUR Trace*   BLOODURINE 1+*   PHURINE 6.0   PROUR 30*   UROBILINOGEN Normal   NITRITE Negative   LEUUR 25*   WBCUR 1-5   RBCUR >10*   BACUR None Seen   EPIUR Few*         Established Problem list:  Patient Active Problem List   Diagnosis    Acute sinusitis, recurrence not specified, unspecified location    Arthritis    Bronchitis    Blepharitis of right lower eyelid    Breast pain, right    Other chronic pain    Chronic midline low back pain with left-sided sciatica    Cervical radiculopathy    Hypertension due to endocrine disorder    Hypokalemia    Insomnia    Left lumbar radiculopathy    Sarcoma of pelvic peritoneum (HCC)    Fibromyalgia    History of left breast cancer    Hyperlipidemia, unspecified hyperlipidemia type    Hypothyroidism, unspecified type    VANDA (generalized anxiety disorder)    Age-related osteoporosis without current pathological fracture    Abnormal EKG    COPD with acute exacerbation (HCC)    Diarrhea of presumed infectious origin    Disorder of thyroid    Generalized weakness    Hordeolum externum of right lower eyelid    Lumbar facet arthropathy    Osteopenia    Primary hyperparathyroidism (HCC)    Hypertension    Productive cough    Simple chronic bronchitis (HCC)    Vitamin D deficiency    Abdominal pain, acute    Leukocytosis, unspecified type        ASSESSMENT/PLAN:  1. SP radical resection sarcoma 5/10  Admitted with low grade temp  -CT shows fluid collection and hydro   and surgical oncology following  Continue zosyn pending urine culture          Corey Das MD, MD  [facility]  [phone]

## 2024-05-31 NOTE — CONSULTS
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 She presented to the ER yesterday with nausea, vomiting, and fever of 101F. 2024 CT showed mild left hydronephrosis/hydroureter likely due to postoperative fluid collection. Renal function normal at this time. Creatinine currently 0.62, baseline 0.5-0.7. No complaints of flank pain, difficulty voiding, urgency or frequency. UA shows protein 30mg/dL,and leukocytes 25 indicating possible UTI. Preliminary urine culture shows no growth at 18 hours. Awaiting final urine culture results.    HISTORY:  Past Medical History:    Cancer (HCC)    Disorder of thyroid    Exposure to medical diagnostic radiation    last tx 2024    High blood pressure    Hx of motion sickness    Visual impairment    glasses      Past Surgical History:   Procedure Laterality Date    Cholecystectomy      Hysterectomy      Mastectomy left      Other surgical history      left hemilaminectomy L3-L4      Family History   Problem Relation Age of Onset    Breast Cancer Daughter     Other (Colorectal Cancer) Son     Breast Cancer Maternal Aunt     Breast Cancer Maternal Aunt     Breast Cancer Maternal Aunt       Social History:   Social History     Socioeconomic History    Marital status:    Tobacco Use    Smoking status: Former     Current packs/day: 0.00     Types: Cigarettes     Quit date:      Years since quittin.4    Smokeless tobacco: Never   Vaping Use    Vaping status: Never Used   Substance and Sexual Activity    Alcohol use: Not Currently    Drug use: Not Currently     Types: Cannabis     Social Determinants of Health     Food Insecurity: No Food Insecurity (2024)    Food Insecurity     Food Insecurity: Never true   Transportation Needs: No Transportation  Needs (5/30/2024)    Transportation Needs     Lack of Transportation: No   Housing Stability: Low Risk  (5/30/2024)    Housing Stability     Housing Instability: No        Allergies  Allergies   Allergen Reactions    Naproxen OTHER (SEE COMMENTS)    Omeprazole HIVES and ITCHING    Ondansetron OTHER (SEE COMMENTS)     Patient states she can take without difficulty      Radiology Contrast Iodinated Dyes ITCHING     DYE from CT Scan causes itching per Patient.    CT contrast, per patient CT was stopped and needed Benadryl during procedure. C/O itching   DYE from CT Scan causes itching per Patient.    Iodine ITCHING       Review of Systems:   A 10-point review of systems was completed and is negative other than as noted above.    Physical Exam:   /75 (BP Location: Right arm)   Pulse 75   Temp 99 °F (37.2 °C) (Oral)   Resp 15   Ht 5' 2\" (1.575 m)   Wt 187 lb 9.8 oz (85.1 kg)   SpO2 91%   BMI 34.31 kg/m²     GENERAL APPEARANCE: well developed, well nourished, in no acute distress  NEUROLOGIC: no localizing neurologic signs, alert and oriented x 3, converses appropriately  HEAD: atraumatic, normocephalic  EYES: sclera non-icteric  ORAL CAVITY: mucosa moist  NECK/THYROID: no obvious masses or goiter  LUNGS: non-labored breathing  ABDOMEN: soft, nontender, nondistended  EXTREMITIES: warm, well-perfused. No clubbing, cyanosis or edema.  SKIN: no obvious rashes    Results:     Laboratory Data:  Lab Results   Component Value Date    WBC 8.5 05/31/2024    HGB 10.7 (L) 05/31/2024    .0 05/31/2024     Lab Results   Component Value Date     05/31/2024    K 3.2 (L) 05/31/2024     05/31/2024    CO2 22.0 05/31/2024    BUN 8 (L) 05/31/2024     (H) 05/31/2024    AST 28 05/31/2024    ALT 25 05/31/2024    TP 6.6 05/31/2024    ALB 2.6 (L) 05/31/2024    PHOS 2.6 05/11/2024    CA 9.6 05/31/2024    MG 2.2 05/31/2024       Urinalysis Results (last three years):  Recent Labs     05/12/24 1927  05/30/24  1737   COLORUR Yellow Yellow   CLARITY Clear Turbid*   SPECGRAVITY 1.024 1.029   PHURINE 5.5 6.0   PROUR Negative 30*   GLUUR Normal Normal   KETUR 10* Trace*   BILUR Negative Negative   BLOODURINE Negative 1+*   NITRITE Negative Negative   UROBILINOGEN Normal Normal   LEUUR Negative 25*   WBCUR  --  1-5   RBCUR  --  >10*   BACUR  --  None Seen       Urine Culture Results (last three years):  Lab Results   Component Value Date    URINECUL No Growth at <18 hours 05/30/2024       Imaging  CT ABDOMEN+PELVIS(CPT=74176)    Result Date: 5/30/2024  PROCEDURE:  CT ABDOMEN+PELVIS (CPT=74176)  COMPARISON:  [facility] , CT, CT CHEST+ABDOMEN+PELVIS(CPT=71250/39619), 5/30/2024, 12:07 PM.  INDICATIONS:  fever and vomiting last night- brought over from surgical oncology  TECHNIQUE:  Unenhanced multislice CT scanning was performed from the dome of the diaphragm to the pubic symphysis.  Dose reduction techniques were used. Dose information is transmitted to the ACR (American College of Radiology) NRDR (National Radiology Data Registry) which includes the Dose Index Registry.  PATIENT STATED HISTORY: (As transcribed by Technologist)   Fever and vomiting last night. Patient brought over from surgical oncology.    FINDINGS:    LIVER:  Unremarkable.  BILIARY:  Cholecystectomy  PANCREAS:  Unremarkable.  SPLEEN:  Unremarkable.  KIDNEYS:  New mild left hydronephrosis and hydroureter, no likely secondary to the left ureter being compromise as it courses along/through previously demonstrated postsurgical fluid collection and tissue thickening in the left pelvis adjacent to multiple surgical clips as shown on CT performed a few hours earlier.  No kidney stone identified.  The right kidney shows no hydronephrosis.  ADRENALS:  Unremarkable.  AORTA/VASCULAR:  No aortic aneurysm.  RETROPERITONEUM:  Unremarkable.  BOWEL/MESENTERY:  No acute process.  Moderate amount of stool throughout the colon.  No sign of colitis, diverticulitis, bowel  obstruction, free air, ascites, mesenteric adenopathy, mesenteric edema.  ABDOMINAL WALL:  Unremarkable.  URINARY BLADDER:  Limited assessment of the urinary bladder which contains only a small amount of urine at the time of scanning, but based on the appearance on this examination, no specific bladder abnormalities identified.   LYMPH NODES PELVIS:  Unremarkable.  PELVIC ORGANS:  Hysterectomy.  Stable appearing complex postsurgical collection and tissue thickening adjacent to surgical clips in the left pelvis unchanged from the earlier CT from today.  LUNG BASES:  No acute process.  BONES:  No acute abnormality.              CONCLUSION:  Compared with the prior CT performed just a few hours ago, there is now left-sided hydronephrosis, and hydroureter, mild, likely secondary to compromise of the left ureter as it courses adjacent to or through the previously demonstrated and stable appearing postsurgical left pelvic complex fluid collection/tissue thickening and adjacent surgical clips.  Consider urology evaluation.  Otherwise, no major change since the prior.   LOCATION:  [id number]   Dictated by ([facility]): Jersey Barclay MD on 5/30/2024 at 4:39 PM     Finalized by (CST): Jersey Barclay MD on 5/30/2024 at 4:44 PM       XR CHEST AP PORTABLE  (CPT=71045)    Result Date: 5/30/2024  PROCEDURE:  XR CHEST AP PORTABLE  (CPT=71045)  TECHNIQUE:  AP chest radiograph was obtained.  COMPARISON:  [facility] , XR, XR CHEST AP PORTABLE  (CPT=71045), 5/12/2024, 5:05 PM.  INDICATIONS:  fever and vomiting last night- brought over from surgical oncology  PATIENT STATED HISTORY: (As transcribed by Technologist)    FINDINGS:  The heart is normal in size.  The lungs are clear of acute-appearing disease process.  The costophrenic angles are sharp.  There is no active disease seen on the basis of portable chest radiography.            CONCLUSION:  No active disease seen.  LOCATION:  [id number]      Dictated by ([facility]): Jersey Barclay MD on 5/30/2024 at  2:38 PM     Finalized by (CST): Jersey Barclay MD on 5/30/2024 at 2:38 PM       CT CHEST+ABDOMEN+PELVIS(CPT=71250/80557)    Result Date: 5/30/2024  PROCEDURE:  CT CHEST+ABDOMEN+PELVIS(CPT=71250/51510)  COMPARISON:  [facility] , XR, XR ABDOMEN (1 VIEW) (CPT=74018), 5/14/2024, 8:09 AM.  [facility] , CT, CT ABDOMEN+PELVIS(CPT=74176), 5/14/2024, 10:36 AM.  INDICATIONS:  D72.829 Leukocytosis, unspecified type M79.89 Mass of soft tissue of pelvis  TECHNIQUE:  Following oral contrast administration, unenhanced multislice CT scanning is performed through the chest, abdomen, and pelvis.  Dose reduction techniques were used. Dose information is transmitted to the ACR (American College of Radiology) NRDR (National Radiology Data Registry) which includes the Dose Index Registry.  PATIENT STATED HISTORY: (As transcribed by Technologist)  Patient presents with nausea, vomiting, and difficulty breathing. Recent history of pneumonia. Hx mass of soft tissue Pelvis    FINDINGS:  Sensitivity decreased without IV or oral contrast.  CHEST:  LUNGS:  Decreasing opacities at both lung bases. MEDIASTINUM:  No adenopathy. RICK:  No adenopathy. CARDIAC:  No pericardial effusion.  Coronary calcifications are present. PLEURA:  No pleural effusion. CHEST WALL:  No axillary adenopathy.  Left mastectomy changes. AORTA:  Tortuosity and ectasia.  Ascending diameter 3.8 cm.  Corresponding descending diameter 2.5 cm. VASCULATURE:  Smooth tapering.  ABDOMEN/PELVIS: LIVER:  Uniform parenchyma. BILIARY:  Surgically absent gallbladder.  No biliary dilatation. PANCREAS:  Uniform parenchyma.  No ductal dilatation. SPLEEN:  Not enlarged. KIDNEYS:  Normal anatomic positions.  No hydronephrosis.  No renal calculus disease. ADRENALS:  Nodularity bilaterally, without a discrete nodule. AORTA:  No abdominal aortic aneurysm.  Mild calcified atherosclerosis. RETROPERITONEUM:  No adenopathy.  A few nonenlarged lymph nodes appear similar to the prior. BOWEL/MESENTERY:   Normal bowel caliber.  Redundant colon.  No new colonic inflammation.  No free air.  No generalized ascites. ABDOMINAL WALL:  Midline laparotomy changes.  No fluid collection or hematoma.   A few drops of subcutaneous air in the left lower quadrant. URINARY BLADDER:  Nondistended. PELVIC NODES:  None enlarged. PELVIC ORGANS:  Surgically absent uterus.  There is a focal fluid collection adjacent to surgical clips in the posterior left pelvis.  5.2 x 3.4 cm.  No gas within.  Mild edema edema within the pelvis, increased. BONES:  Scattered, overall moderate degenerative changes.  Maintained vertebral body heights.  No subluxation.             CONCLUSION:  1. There is a focal fluid collection adjacent to surgical clips in the left posterior pelvis.  This may be a lymphocele or seroma.  An infected collection would be difficult to exclude, however there is no air within or other more specific features of abscess. 2. Improved aeration to the lung bases.  Mild residual atelectasis.  The potential for pneumonitis should be correlated with clinical suspicion for infection. 3. Details as above.  Continued clinical correlation recommended.  Report called by the radiology staff.      LOCATION:  [place]    Dictated by (CST): Macho Menjivar MD on 5/30/2024 at 1:07 PM     Finalized by (CST): Macho Menjivar MD on 5/30/2024 at 1:18 PM       US VENOUS DOPPLER LEG LEFT - DIAG IMG (CPT=93971)    Result Date: 5/23/2024  PROCEDURE:  US VENOUS DOPPLER LEG LEFT - DIAG IMG (CPT=93971)  COMPARISON:  None.  INDICATIONS:  M79.89 Swelling of left lower extremity M79.89 Mass of soft tissue of pelvis  TECHNIQUE:  Real time, grey scale, and duplex ultrasound was used to evaluate the lower extremity venous system. B-mode two-dimensional images of the vascular structures, Doppler spectral analysis, and color flow.  Doppler imaging were performed.  The following veins were imaged:  Common, deep, and superficial femoral, popliteal, sapheno-femoral junction,  posterior tibial veins, and the contralateral common femoral vein.  PATIENT STATED HISTORY: (As transcribed by Technologist)     FINDINGS:  EXTREMITY EXAMINED:  Left lower extremity SAPHENOFEMORAL JUNCTION:  No reflux. THROMBI:  None visible. COMPRESSION:  Normal compressibility, phasicity, and augmentation. OTHER:  Negative.            CONCLUSION:  No evidence of DVT in the left lower extremity.   LOCATION:  [place] [id number]    Dictated by (CST): Aniket Braden MD on 5/23/2024 at 3:12 PM     Finalized by (CST): Aniket Braden MD on 5/23/2024 at 3:12 PM       US VENOUS DOPPLER LEG BILAT - DIAG IMG (CPT=93970)    Result Date: 5/14/2024  PROCEDURE:  US VENOUS DOPPLER LEG BILAT - DIAG IMG (CPT=93970)  COMPARISON:  None.  INDICATIONS:  Fever of unknown origin, possible DVT, recent surgery, history of malignancy  TECHNIQUE:  Real time, grey scale, and duplex ultrasound was used to evaluate the lower extremity venous system. B-mode two-dimensional images of the vascular structures, Doppler spectral analysis, and color flow.  Doppler imaging were performed.  The following veins were imaged bilaterally:  Common, deep, and superficial femoral, popliteal, sapheno-femoral junction, and posterior tibial veins.  PATIENT STATED HISTORY: (As transcribed by Technologist)     FINDINGS:  SAPHENOFEMORAL JUNCTION:  No reflux. THROMBI:  None visible. COMPRESSION:  Normal compressibility, phasicity, and augmentation. OTHER:  Negative.            CONCLUSION:  Unremarkable bilateral lower extremity venous ultrasound examination.   LOCATION:  [place]   Dictated by (CST): Casey Camacho MD on 5/14/2024 at 7:32 PM     Finalized by (CST): Casey Camacho MD on 5/14/2024 at 7:33 PM       CT ABDOMEN+PELVIS(CPT=74176)    Result Date: 5/14/2024  PROCEDURE:  CT ABDOMEN+PELVIS (CPT=74176)  COMPARISON:  [facility], CT, CT ABDOMEN PELVIS WO CONTRAST, 12/18/2023, 6:51 PM.  INDICATIONS:  Leukocytosis, nausea and vomiting  TECHNIQUE:  Unenhanced  multislice CT scanning was performed from the dome of the diaphragm to the pubic symphysis.  Dose reduction techniques were used. Dose information is transmitted to the ACR (American College of Radiology) NRDR (National Radiology Data Registry) which includes the Dose Index Registry.  PATIENT STATED HISTORY: (As transcribed by Technologist)  Patient is here for leukocytosis with nausea and vomitting.    FINDINGS:  LIVER:  No enlargement, atrophy, abnormal density, or significant focal lesion.  BILIARY:  Sequelae of cholecystectomy. PANCREAS:  No lesion, fluid collection, ductal dilatation, or atrophy.  SPLEEN:  No enlargement or focal lesion.  KIDNEYS:  No mass, obstruction, or calcification.  ADRENALS:  No mass or enlargement.  AORTA/VASCULAR:    Unremarkable as seen on non-contrast imaging. RETROPERITONEUM:  No mass or adenopathy.  BOWEL/MESENTERY:  No visible mass, obstruction, or bowel wall thickening.  ABDOMINAL WALL:  Skin staples over the ventral aspect of the abdomen pelvis.  Subcutaneous gas over the right ventral fat.  Subcutaneous edema surrounding the abdomen and pelvis. URINARY BLADDER:  No visible focal wall thickening, lesion, or calculus.  PELVIC NODES:  No adenopathy.  PELVIC ORGANS:  Previously noted mass in the left side of the pelvis has been debulked with numerous surgical clips and gas is noted.  This region measures approximately 6.6 x 3.3 cm. BONES:  No bony lesion or fracture.  LUNG BASES:  Atelectasis in the right lower lobe along with patchy ground-glass densities at the lung bases. OTHER:  Left posterior subcutaneous fat battery pack is noted.  Leads extending into the spinal canal.            CONCLUSION:   1. Patchy ground-glass densities the lung bases may be due to mild fluid overload.  Developing consolidations cannot be excluded.  2. Previously noted mass in left side of the pelvis has been debulked with numerous surgical clips and extraluminal gas.  This is likely postoperative in  nature.  A fluid collection is not identified.    LOCATION:  [id number]   Dictated by (CST): Aniket Braden MD on 5/14/2024 at 10:47 AM     Finalized by (CST): Aniket Brdaen MD on 5/14/2024 at 10:52 AM       XR ABDOMEN (1 VIEW) (CPT=74018)    Result Date: 5/14/2024  PROCEDURE:  XR ABDOMEN (1 VIEW) (CPT=74018)  INDICATIONS:  Vomiting  COMPARISON:  None.  TECHNIQUE:  Supine AP view was obtained.  PATIENT STATED HISTORY: (As transcribed by Technologist)  Patient states she has lower abdominal pain since Sunday.              CONCLUSION:    Moderate gas throughout the right colon, transverse colon, descending and sigmoid colon, with only minimal gas in the sigmoid colon.  Nonspecific.  No excessive stool.  No sign of dilation small-bowel.  Midline abdominal staples.  Spine stimulator seen.  Elevated right diaphragm. If the patient has acute abdomen symptoms, or other abdominal/pelvic imaging requirements further than plain x-ray of the abdomen, consider CT scan of the abdomen and pelvis with intravenous and oral contrast for further assessment.  LOCATION:  [place]   Dictated by (CST): Jersey Barclay MD on 5/14/2024 at 8:29 AM     Finalized by (CST): Jersey Barclay MD on 5/14/2024 at 8:29 AM       XR CHEST AP PORTABLE  (CPT=71045)    Result Date: 5/12/2024  PROCEDURE:  XR CHEST AP PORTABLE  (CPT=71045)  TECHNIQUE:  AP chest radiograph was obtained.  COMPARISON:  None.  INDICATIONS:  Hypoxia  PATIENT STATED HISTORY: (As transcribed by Technologist)  Patient offered no additional history at this time.    FINDINGS:  There is mildly prominent consolidation at the medial right lung base concerning for pneumonia.  Mild left pleural effusion.  Partially visualized spinal canal electrodes.  Enlarged cardiac silhouette.  Minimal left basilar atelectasis.  No measurable pneumothorax.            CONCLUSION:  1. Mildly prominent consolidation at the medial right lung base is concerning for pneumonia.  Follow-up imaging after  treatment is recommended to ensure resolution. 2. Trace left pleural effusion with minimal left basilar atelectasis.   LOCATION:  [place]      Dictated by (CST): Stromberg, LeRoy, MD on 5/12/2024 at 5:48 PM     Finalized by (CST): Stromberg, LeRoy, MD on 5/12/2024 at 5:50 PM           Impression:     Patient is a 63 year old female with mild left hydronephrosis/hydroureter and possible UTI. Renal function normal. Normal WBC.    Recommendations:  -Continue to monitor renal function. Will consider ureteral stent placement if clinically worsening.  -Awaiting urine and blood cultures. Continue to treat with empiric antibiotics.      Thank you very much for this consult. Please call if there are any questions or concerns.     CARA Vlilalpando  Urology  [facility]    Date: 5/31/2024  Time: 11:33 AM

## 2024-05-31 NOTE — PAYOR COMM NOTE
--------------  ADMISSION REVIEW     Payor: ARIEL ORNELAS POS/MCNP  Subscriber #:  [id number]  Authorization Number: [id number]    Admit date: 5/30/24  Admit time:  9:11 PM       History   HPI  This is a 63-year-old female presents with fever and abdominal pain with past medical history of sarcoma of the pelvic peritoneum.  Patient underwent radical resection of a sarcoma of the left pelvis with resection of the left internal iliac artery and vein on 5/10/2024 by Dr. Mary.  She was admitted 5/10 through 5/16/2024.  Patient states that she started feeling ill yesterday.  She had a temperature of 99.  She developed some lower abdominal pain in the left side.  Tmax today of 101 °F at around 930 this morning.  She denies any cough.  She has been vomiting.  She states she had 6-8 episodes of vomiting over the last 24 hours and diarrhea once today.  No dysuria.  She has some left thigh pain which she had preoperatively which they thought was some compression of her nerves from the pelvic mass.  That thigh pain has recurred.  She presents from home for further evaluation.  ED Triage Vitals   BP 05/30/24 1337 131/80   Pulse 05/30/24 1337 97   Resp 05/30/24 1337 18   Temp 05/30/24 1545 99 °F (37.2 °C)   Temp src 05/30/24 1545 Oral   SpO2 05/30/24 1338 97 %   O2 Device 05/30/24 1400 None (Room air)   GENERAL: Awake, alert oriented x3  HEENT:  Pupils equally round and reactive to light. Conjuctiva clear.  Oropharynx is clear and moist.   Lungs: Clear to auscultation bilaterally with no rales, no retractions, and no wheezing.  HEART:  Regular rate and rhythm. S1 and S2. No murmurs, no rubs or gallops.   ABDOMEN: Soft, tender in the left lower quadrant and left flank area.   midline incision is clean dry and intact.. Normoactive bowel sounds. No rebound. No guarding.   EXTREMITIES: Warm with brisk capillary refill.       Labs Reviewed   COMP METABOLIC PANEL (14) - Abnormal; Notable for the following components:       Result Value     Glucose 132 (*)     Potassium 3.4 (*)     Calcium, Total 10.5 (*)     Albumin 3.1 (*)     A/G Ratio 0.7 (*)     All other components within normal limits   LACTIC ACID, PLASMA - Abnormal; Notable for the following components:    Lactic Acid 2.5 (*)     All other components within normal limits   PROCALCITONIN - Abnormal; Notable for the following components:    Procalcitonin 27.00 (*)    URINALYSIS WITH CULTURE REFLEX - Abnormal; Notable for the following components:    Clarity Urine Turbid (*)     Ketones Urine Trace (*)     Blood Urine 1+ (*)     Protein Urine 30 (*)     Leukocyte Esterase Urine 25 (*)     RBC Urine >10 (*)     Squamous Epi. Cells Few (*)     All other components within normal limits   CBC W/ DIFFERENTIAL - Abnormal; Notable for the following components:    WBC 21.0 (*)     Neutrophil Absolute Prelim 19.23 (*)     Neutrophil Absolute 19.23 (*)      EKG    Rate, intervals and axes as noted on EKG Report.  Rate: 90  Rhythm: Sinus Rhythm  Reading: No acute changes.  Poor R wave progression.      CT ABDOMEN+PELVIS(CPT=74176)  Result Date: 5/30/2024  CONCLUSION:  Compared with the prior CT performed just a few hours ago, there is now left-sided hydronephrosis, and hydroureter, mild, likely secondary to compromise of the left ureter as it courses adjacent to or through the previously demonstrated and stable appearing postsurgical left pelvic complex fluid collection/tissue thickening and adjacent surgical clips.  Consider urology evaluation.  Otherwise, no major change since the prior.   LOCATION:  [id number]   Dictated by (CST): Jersey Barclay MD on 5/30/2024 at 4:39 PM     Finalized by (CST): Jersey Barclay MD on 5/30/2024 at 4:44 PM       XR CHEST AP PORTABLE  (CPT=71045)  Result Date: 5/30/2024  CONCLUSION:  No active disease seen.  LOCATION:  [id number]      Dictated by (CST): Jersey Barclay MD on 5/30/2024 at 2:38 PM     Finalized by (CST): Jersey Barclay MD on 5/30/2024 at 2:38 PM       CT  CHEST+ABDOMEN+PELVIS(CPT=71250/24032)  Result Date: 5/30/2024  CONCLUSION:  1. There is a focal fluid collection adjacent to surgical clips in the left posterior pelvis.  This may be a lymphocele or seroma.  An infected collection would be difficult to exclude, however there is no air within or other more specific features of abscess. 2. Improved aeration to the lung bases.  Mild residual atelectasis.  The potential for pneumonitis should be correlated with clinical suspicion for infection. 3. Details as above.  Continued clinical correlation recommended.  Report called by the radiology staff.      LOCATION:  [facility]    Dictated by (CST): Macho Menjivar MD on 5/30/2024 at 1:07 PM     Finalized by (CST): Macho Menjivar MD on 5/30/2024 at 1:18 PM       Admission disposition: 5/30/2024  7:15 PM  Disposition and Plan   Clinical Impression:  1. Abdominal pain, acute    2. Leukocytosis, unspecified type    3. Hydronephrosis, left    4. Sarcoma (HCC)       Disposition:  Admit  5/30/2024  7:15 pm          History and Physical   History of Present Illness:    Citlali Leung is a 63 year old female with recent radical resection of sarcoma left pelvis with resection of left internal iliac artery and vein with Dr. Goddard on 5/10/24 presented to the emergency room with ongoing fever at home.  Per patient since last week she was having low-grade temperature of 99.4.  She noted that yesterday before coming into the ER her temperature was as high as 101.  She had some nausea and vomiting but no abdominal pain, dysuria, back pain, chest pain or shortness of breath, cough.     Lab 05/30/24  1116 05/30/24  1350 05/31/24  0604   RBC 4.29 4.11 3.60*   HGB 12.7 12.4 10.7*   HCT 39.3 36.7 33.2*   MCV 91.6 89.3 92.2   MCH 29.6 30.2 29.7   MCHC 32.3 33.8 32.2   RDW 13.2 13.2 13.2   NEPRELIM 20.12* 19.23* 6.65   WBC 21.9* 21.0* 8.5   .0 397.0 311.0      Lab 05/30/24  1116 05/30/24  1350 05/31/24  0604   * 132* 105*   BUN 9 9 8*    CREATSERUM 0.89 0.80 0.62   EGFRCR 74 84 101   CA 10.8* 10.5* 9.6   ALB 3.0* 3.1* 2.6*    137 140   K 3.7 3.4* 3.2*    104 110   CO2 23.0 22.0 22.0   ALKPHO 127 126 128   AST 18 16 28   ALT 15 16 25   BILT 0.3 0.5 0.4   TP 7.4 7.5 6.6      Lab 05/30/24  1350   PCT 27.00*         Imaging: Imaging data reviewed in Epic.     Assessment & Plan:       # Febrile illness  - cultures taken in ER and penidng   - cont zosyn for now   - ID consulted   - Dr Usman kidd appreciated   - urology consulted      # mild hydronephrosis   - urology eval      # possible UTI   - cont abx      # hypokalemia , replace   # mild anemia , monitor   # hypercalcemia, IVF     # 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  #Leiomyosarcoma   #Essential HTN   - amlodipine, losartan      #Hypothyroidism   - Synthroid      #VANDA   - Duloxetine      #Peripheral neuropathy   - Gabapentin     #S/p Spine stimulator in place      #Chronic bronchitis            INFECTIOUS DISEASE  Reason for Consultation:  Postop fever  UTI with hydro     History of Present Illness:  Citlali Leung is a a(n) 63 year old female diagnosed with left pelvic and retroperitoneal sarcoma, and underwent radical resection and left uretolysis on 5/10/24.   She came to ED on overnight 5/29 due to fever at home, 101 at home.  On arrival in ED temp 99 and tm since admission 99.1.   CT shows a fluid collection, and left sided hydronphrosis.   1. SP radical resection sarcoma 5/10    Admitted with low grade temp  -CT shows fluid collection and hydro   and surgical oncology following  Continue zosyn pending urine culture    MEDICATIONS ADMINISTERED IN LAST 1 DAY:  acetaminophen (Tylenol Extra Strength) tab 500 mg       Date Action Dose Route User    5/31/2024 1107 Given 500 mg Oral Leigh Ann Casper, DEO          amLODIPine (Norvasc) tab 5 mg       Date Action Dose Route User    5/31/2024 0815 Given 5 mg Oral Leigh Ann Casper, RN           DULoxetine (Cymbalta) DR cap 60 mg       Date Action Dose Route User    5/31/2024 0816 Given 60 mg Oral TremayneLeigh Ann tobar RN          fluticasone furoate-vilanterol (Breo Ellipta) 200-25 MCG/ACT inhaler 1 puff       Date Action Dose Route User    5/31/2024 0834 Given 1 puff Inhalation Barbara Galindo, RCP          gabapentin (Neurontin) cap 300 mg       Date Action Dose Route User    5/31/2024 1602 Given 300 mg Oral TremayneLeigh Ann tobar RN    5/31/2024 0815 Given 300 mg Oral TremayneLeigh Ann rodriguez RN    5/30/2024 2210 Given 300 mg Oral Linda Vargas RN          heparin (Porcine) 5000 UNIT/ML injection 5,000 Units       Date Action Dose Route User    5/31/2024 1602 Given 5,000 Units Subcutaneous (Left Lower Abdomen) Leigh Ann Casper RN    5/31/2024 0532 Given 5,000 Units Subcutaneous (Left Lower Abdomen) Linda Vargas RN    5/30/2024 2214 Given 5,000 Units Subcutaneous (Right Lower Abdomen) Linda Vargas RN          HYDROmorphone (Dilaudid) tab 4 mg       Date Action Dose Route User    5/31/2024 1325 Given 4 mg Oral Leigh Ann Casper RN    5/31/2024 0727 Given 4 mg Oral Leigh Ann Casper RN    5/31/2024 0310 Given 4 mg Oral Linda Vargas RN    5/30/2024 2210 Given 4 mg Oral Linda Vargas RN          losartan (Cozaar) tab 100 mg       Date Action Dose Route User    5/31/2024 0815 Given 100 mg Oral Leigh Ann Casper RN          morphINE PF 4 MG/ML injection       Date Action Dose Route User    5/30/2024 1611 Given 4 mg Intravenous Mary Short RN          morphINE PF 4 MG/ML injection 4 mg       Date Action Dose Route User    5/30/2024 1611 Given 4 mg Intravenous Mary Short RN          piperacillin-tazobactam (Zosyn) 3.375 g in dextrose 5% 100 mL IVPB-ADDV       Date Action Dose Route User    5/31/2024 1603 New Bag 3.375 g Intravenous Leigh Ann Casper, RN    5/31/2024 0815 New Bag 3.375 g Intravenous Tremayne, Breana, RN    5/31/2024 0018 New Bag 3.375 g Intravenous Sam,  DEO Mckeon          piperacillin-tazobactam (Zosyn) 4.5 g in dextrose 5% 100 mL IVPB-ADDV       Date Action Dose Route User    5/30/2024 1611 New Bag 4.5 g Intravenous Mary Short RN          potassium chloride (Klor-Con M20) tab 40 mEq       Date Action Dose Route User    5/31/2024 1324 Given 40 mEq Oral Leigh Ann Casper RN          sodium chloride 0.9% infusion 1,000 mL       Date Action Dose Route User    5/30/2024 1612 New Bag 1,000 mL Intravenous Mary Short RN          sodium chloride 0.9% infusion       Date Action Dose Route User    5/30/2024 2212 New Bag (none) Intravenous Linda Vargas RN          levothyroxine (Synthroid) tab 175 mcg       Date Action Dose Route User    5/31/2024 0532 Given 175 mcg Oral Linda Vargas RN            Vitals (last day)       Date/Time Temp Pulse Resp BP SpO2 Weight O2 Device O2 Flow Rate (L/min) Who    05/31/24 1230 99 °F (37.2 °C) 72 18 108/72 93 % -- None (Room air) -- CR    05/31/24 0800 99 °F (37.2 °C) 75 15 117/75 91 % -- -- -- AS    05/30/24 2100 98.9 °F (37.2 °C) 75 14 118/84 94 % -- None (Room air) -- CB    05/30/24 1754 99.1 °F (37.3 °C) -- -- -- -- -- -- -- CN    05/30/24 1545 99 °F (37.2 °C) -- -- -- -- -- -- -- CN

## 2024-05-31 NOTE — ED QUICK NOTES
RN Station patient report given to DEO Mooney. Plan of care reviewed. Pt being admitted; note complete, ticket in chart. Monitor pain

## 2024-05-31 NOTE — PLAN OF CARE
Pt Aox4  Pt complains of left leg pain   -prn given  RA  NSR  IV anbx  Iv fluids   Pt able to tolerate low fiber soft diet  Pt's bed in lowest position and bed alarm on,  Call light within reach  All of pt and family question answered

## 2024-06-01 ENCOUNTER — TELEPHONE (OUTPATIENT)
Dept: SURGERY | Facility: CLINIC | Age: 64
End: 2024-06-01

## 2024-06-01 VITALS
SYSTOLIC BLOOD PRESSURE: 114 MMHG | OXYGEN SATURATION: 94 % | HEART RATE: 67 BPM | HEIGHT: 62 IN | TEMPERATURE: 98 F | RESPIRATION RATE: 19 BRPM | BODY MASS INDEX: 34.53 KG/M2 | DIASTOLIC BLOOD PRESSURE: 76 MMHG | WEIGHT: 187.63 LBS

## 2024-06-01 DIAGNOSIS — N13.39 OTHER HYDRONEPHROSIS: Primary | ICD-10-CM

## 2024-06-01 LAB
ANION GAP SERPL CALC-SCNC: 8 MMOL/L (ref 0–18)
BUN BLD-MCNC: 6 MG/DL (ref 9–23)
CALCIUM BLD-MCNC: 10.1 MG/DL (ref 8.5–10.1)
CHLORIDE SERPL-SCNC: 112 MMOL/L (ref 98–112)
CO2 SERPL-SCNC: 23 MMOL/L (ref 21–32)
CREAT BLD-MCNC: 0.63 MG/DL
EGFRCR SERPLBLD CKD-EPI 2021: 101 ML/MIN/1.73M2 (ref 60–?)
GLUCOSE BLD-MCNC: 106 MG/DL (ref 70–99)
OSMOLALITY SERPL CALC.SUM OF ELEC: 294 MOSM/KG (ref 275–295)
POTASSIUM SERPL-SCNC: 4 MMOL/L (ref 3.5–5.1)
POTASSIUM SERPL-SCNC: 4 MMOL/L (ref 3.5–5.1)
SODIUM SERPL-SCNC: 143 MMOL/L (ref 136–145)

## 2024-06-01 PROCEDURE — 99239 HOSP IP/OBS DSCHRG MGMT >30: CPT | Performed by: INTERNAL MEDICINE

## 2024-06-01 PROCEDURE — 99232 SBSQ HOSP IP/OBS MODERATE 35: CPT | Performed by: UROLOGY

## 2024-06-01 RX ORDER — CIPROFLOXACIN 500 MG/1
500 TABLET, FILM COATED ORAL 2 TIMES DAILY
Qty: 28 TABLET | Refills: 0 | Status: SHIPPED | OUTPATIENT
Start: 2024-06-01 | End: 2024-06-15

## 2024-06-01 NOTE — PLAN OF CARE
Pt Aox4  Pt complains of left leg pain    -prn given  RA  NSR  Iv anbx  Call light within reach  All of pt and family question answered   Discharge planning   -went over importance of meds and follow up   -iv and tele removed   -pt has all belongings

## 2024-06-01 NOTE — DISCHARGE SUMMARY
[facility]     Citlali Leung Patient Status:  Inpatient    1960 MRN [id number]   Location [facility]A Attending Lin Seymour MD   Hosp Day # 2 PCP Janel Bearhekwube     Date of Admission: 2024  Date of Discharge:   24    Discharge Disposition: Left Without Being Seen    Discharge Diagnosis:    # Febrile illness  - cultures taken in ER and pending , NGTD  - cont zosyn for now   - ID, urology, surgery rec appreciated      # mild hydronephrosis   - urology eval noted      # possible UTI   - cont abx      # hypokalemia , replaced  # mild anemia , monitor   # hypercalcemia, resoled   # 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  #Leiomyosarcoma   #Essential HTN   - amlodipine, losartan      #Hypothyroidism   - Synthroid      #VANDA   - Duloxetine      #Peripheral neuropathy   - Gabapentin     #S/p Spine stimulator in place      #Chronic bronchitis     History of Present Illness: Citlali Leung is a 63 year old female with recent radical resection of sarcoma left pelvis with resection of left internal iliac artery and vein with Dr. Goddard on 5/10/24 presented to the emergency room with ongoing fever at home.  Per patient since last week she was having low-grade temperature of 99.4.  She noted that yesterday before coming into the ER her temperature was as high as 101.  She had some nausea and vomiting but no abdominal pain, dysuria, back pain, chest pain or shortness of breath, cough.          Brief Synopsis: patient admitted and placed on empiric antibiotic. She has done well and will be dc home on oral antibiotic per ID (ciprofloxacin).     Lace+ Score: 60  59-90 High Risk  29-58 Medium Risk  0-28   Low Risk       TCM Follow-Up Recommendation:  LACE > 58: High Risk of readmission after discharge from the hospital.      Consultants:  ID, urology, Surgery     Discharge Medication List:     Discharge Medications        CONTINUE taking these  medications        Instructions Prescription details   acetaminophen 325 MG Tabs  Commonly known as: Tylenol      Take 2 tablets (650 mg total) by mouth every 6 (six) hours as needed.   Refills: 0     amLODIPine 2.5 MG Tabs  Commonly known as: Norvasc      Take 1 tablet (2.5 mg total) by mouth at bedtime.   Refills: 0     amLODIPine 2.5 MG Tabs  Commonly known as: Norvasc      Take 2 tablets (5 mg total) by mouth every morning.   Refills: 0     DULoxetine 60 MG Cpep  Commonly known as: Cymbalta      TAKE 1 CAPSULE BY MOUTH DAILY FOR CHRONIC MUSCLE OR BONE PAIN OR NERVE PAIN OR RECURRENT ANXIETY   Refills: 0     enoxaparin 40 MG/0.4ML Sosy  Commonly known as: Lovenox      Inject 0.4 mL (40 mg total) into the skin daily.   Quantity: 22 each  Refills: 0     fentaNYL 25 MCG/HR Pt72  Commonly known as: Duragesic      Place 1 patch onto the skin every third day.   Refills: 0     fentaNYL 12 MCG/HR Pt72  Commonly known as: Duragesic      Place 1 patch onto the skin every 3 (three) days.   Refills: 0     gabapentin 300 MG Caps  Commonly known as: Neurontin      Take 1 capsule (300 mg total) by mouth 3 (three) times daily.   Refills: 0     HYDROmorphone 4 MG Tabs  Commonly known as: Dilaudid      Take 1 tablet (4 mg total) by mouth every 4 (four) hours as needed for Pain.   Refills: 0     hydrOXYzine 25 MG Tabs  Commonly known as: Atarax      Take 1 tablet (25 mg total) by mouth 2 (two) times daily as needed for Anxiety.   Refills: 0     ipratropium-albuterol 0.5-2.5 (3) MG/3ML Soln  Commonly known as: Duoneb      Inhale 3 mL into the lungs every 6 (six) hours as needed.   Refills: 0     levothyroxine 175 MCG Tabs  Commonly known as: Synthroid      Take 1 tablet (175 mcg total) by mouth before breakfast.   Refills: 0     Naloxone HCl 4 MG/0.1ML Liqd      SPRAY 1 SPRAY IN EACH NOSTRIL FOR OVERDOSE. MAY REPEAT EVERY 2-3 MINUTES IN ALTERNATING NOSTRILS UNTIL MEDICAL ASSISTANCE BECOMES AVAILABLE   Refills: 0     ondansetron 4 MG  Tbdp  Commonly known as: Zofran-ODT      DISSOLVE 1 TABLET ON THE TONGUE THREE TIMES DAILY FOR UP TO 10 DAYS AS NEEDED FOR NAUSEA OR VOMITING   Refills: 0     Symbicort 160-4.5 MCG/ACT Aero  Generic drug: Budesonide-Formoterol Fumarate      Inhale 2 puffs into the lungs 2 (two) times daily.   Refills: 0     telmisartan 80 MG Tabs  Commonly known as: Micardis      Take 0.5-1 tablets (40-80 mg total) by mouth daily.   Refills: 0            STOP taking these medications      amoxicillin clavulanate 875-125 MG Tabs  Commonly known as: Augmentin                 [place]    Follow-up appointment:   No follow-up provider specified.  Appointments for Next 30 Days 2024 - 2024      None            Vital signs:  Temp:  [97 °F (36.1 °C)-98.9 °F (37.2 °C)] 98.4 °F (36.9 °C)  Pulse:  [60-78] 67  Resp:  [16-19] 19  BP: (114-150)/(57-95) 114/76  SpO2:  [76 %-97 %] 94 %    Physical Exam:    General: No acute distress   Lungs: clear to auscultation  Cardiovascular: S1, S2  Abdomen: Soft NTND    -----------------------------------------------------------------------------------------------  PATIENT DISCHARGE INSTRUCTIONS: See electronic chart    Lin Seymour MD    Total time spent on discharge plannin minutes     The  [employer] Cures Act makes medical notes like these available to patients in the interest of transparency. Please be advised this is a medical document. Medical documents are intended to carry relevant information, facts as evident, and the clinical opinion of the practitioner. The medical note is intended as peer to peer communication and may appear blunt or direct. It is written in medical language and may contain abbreviations or verbiage that are unfamiliar.

## 2024-06-01 NOTE — TELEPHONE ENCOUNTER
Lita /María  Pt needs followup OV with one of you in 2 weeks after a CT scan abd and pelvis to reassess hydro and pelvic fluid collection.  Please arrange.  I signed off.

## 2024-06-01 NOTE — PLAN OF CARE
Assumed pt care at 1930  A&Ox4, able to make needs known  Abx given per order  Pain managed with Dilaudid tab  [facility]  Up ad gomez  Call light in reach  Bed in low position

## 2024-06-01 NOTE — DISCHARGE INSTRUCTIONS
Sometimes managing your health at home requires assistance.  The [facility] team has recognized your preference to use [facility]. They can be reached by phone at [phone]. The fax number for your reference is [phone].. A representative from the home health agency will contact you or your family to schedule your first visit.

## 2024-06-01 NOTE — CM/SW NOTE
Noted pt is discharging. Pt setup / [facility] prior to admission. Resumption of care orders sent to agency and agency notified of pt's dc today. DC AVS sent to agency.    CM/SW will remain available for DC planning and/or support.     NEREYDA AlvaradoN, CMSRN    [id number]

## 2024-06-01 NOTE — PROGRESS NOTES
Surgical Oncology Inpatient Progress Note    Subjective:  No acute events  States pelvic pain is stable    Objective:  Temp:  [97 °F (36.1 °C)-99 °F (37.2 °C)] 97 °F (36.1 °C)  Pulse:  [60-78] 60  Resp:  [16-18] 16  BP: (108-150)/(71-95) 119/71  SpO2:  [93 %-97 %] 93 %    Intake/Output:    No intake or output data in the 24 hours ending 06/01/24 0929    Wt Readings from Last 6 Encounters:   05/30/24 85.1 kg (187 lb 9.8 oz)   05/30/24 81.3 kg (179 lb 3.2 oz)   05/23/24 83.8 kg (184 lb 12.8 oz)   05/15/24 86 kg (189 lb 9.6 oz)   04/10/24 85.7 kg (189 lb)   01/08/24 92.6 kg (204 lb 3.2 oz)       Allergies:    Allergies   Allergen Reactions    Naproxen OTHER (SEE COMMENTS)    Omeprazole HIVES and ITCHING    Ondansetron OTHER (SEE COMMENTS)     Patient states she can take without difficulty      Radiology Contrast Iodinated Dyes ITCHING     DYE from CT Scan causes itching per Patient.    CT contrast, per patient CT was stopped and needed Benadryl during procedure. C/O itching   DYE from CT Scan causes itching per Patient.    Iodine ITCHING       Labs:  Recent Labs   Lab 05/30/24  1116 05/30/24  1350 05/31/24  0604   RBC 4.29 4.11 3.60*   HGB 12.7 12.4 10.7*   HCT 39.3 36.7 33.2*   MCV 91.6 89.3 92.2   MCH 29.6 30.2 29.7   MCHC 32.3 33.8 32.2   RDW 13.2 13.2 13.2   NEPRELIM 20.12* 19.23* 6.65   WBC 21.9* 21.0* 8.5   .0 397.0 311.0       Recent Labs   Lab 05/30/24  1350 05/31/24  0604 06/01/24  0631   * 105* 106*   BUN 9 8* 6*   CREATSERUM 0.80 0.62 0.63   CA 10.5* 9.6 10.1    140 143   K 3.4* 3.2* 4.0  4.0    110 112   CO2 22.0 22.0 23.0         Physical Exam:  General: NAD  Lungs: No respiratory distress  Heart: RRR  Abdomen: Soft, nondistended, nontender. 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 Renal function is stable, continue to monitor. Appreciate recs  Antibiotics per ID. Culture workup thus far negative  Regular diet  We will arrange for outpatient follow up with Dr. Mary after discharge    Patient discussed with Dr. Mckinney.    UDAY ChoiC  Department of Surgical Oncology  [address]  [id number] [place] [address]  [address] [place]. [address] [place] [id number]  T: [phone]  F: [phone]

## 2024-06-01 NOTE — PROGRESS NOTES
[facility]   part of [facility]    Progress Note    Citlali Leung Patient Status:  Inpatient    1960 MRN [id number]   Location [facility] 7NE-A Attending Lin Seymour MD   Hosp Day # 2 PCP Janel Hendrickson       Subjective:   Citlali Leung is a(n) 63 year old female   Feels well.  Only complains of left upper thigh pain, appears chronic.  No abdominal pain.  No fever or chills.  No voiding complaints.  Urine culture negative.  On IV Zosyn    Objective:   Blood pressure 131/57, pulse 61, temperature 98.2 °F (36.8 °C), temperature source Oral, resp. rate 19, height 5' 2\" (1.575 m), weight 187 lb 9.8 oz (85.1 kg), SpO2 (!) 76%.    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  Likely compressive in nature.  Urine culture negative.  Kidney function normal.  No indication for urologic intervention at this time.  Would observe for now and repeat abdominal/pelvic imaging in 2 weeks to reassess fluid collection and hydro.  Reviewed with patient and nursing.  ID to decide  on outpatient antibiotics given rapid response to Zosyn and negative urine and blood culture thus far.    Plan:  -Outpatient urology followup in 2 weeks with a repeat CT abd and pelvis.  My office will arrange.  -Will sign off at this time.      Results:     Lab Results   Component Value Date    WBC 8.5 2024    HGB 10.7 (L) 2024    HCT 33.2 (L) 2024    .0 2024    CREATSERUM 0.63 2024    BUN 6 (L) 2024     2024    K 4.0 2024    K 4.0 2024     2024    CO2 23.0 2024     (H) 2024    CA 10.1 2024    ALB 2.6 (L) 2024    ALKPHO 128 2024    AST 28 2024    ALT 25 2024         CT ABDOMEN+PELVIS(CPT=74176)    Result Date: 2024  CONCLUSION:  Compared with the prior CT performed just a few hours ago, there is now  left-sided hydronephrosis, and hydroureter, mild, likely secondary to compromise of the left ureter as it courses adjacent to or through the previously demonstrated and stable appearing postsurgical left pelvic complex fluid collection/tissue thickening and adjacent surgical clips.  Consider urology evaluation.  Otherwise, no major change since the prior.   LOCATION:  [id number]   Dictated by (CST): Jersey Barclay MD on 5/30/2024 at 4:39 PM     Finalized by (CST): Jersey Barclay MD on 5/30/2024 at 4:44 PM       XR CHEST AP PORTABLE  (CPT=71045)    Result Date: 5/30/2024  CONCLUSION:  No active disease seen.  LOCATION:  [id number]      Dictated by (CST): Jersey Barclay MD on 5/30/2024 at 2:38 PM     Finalized by (CST): Jersey Barclay MD on 5/30/2024 at 2:38 PM       CT CHEST+ABDOMEN+PELVIS(CPT=71250/78031)    Result Date: 5/30/2024  CONCLUSION:  1. There is a focal fluid collection adjacent to surgical clips in the left posterior pelvis.  This may be a lymphocele or seroma.  An infected collection would be difficult to exclude, however there is no air within or other more specific features of abscess. 2. Improved aeration to the lung bases.  Mild residual atelectasis.  The potential for pneumonitis should be correlated with clinical suspicion for infection. 3. Details as above.  Continued clinical correlation recommended.  Report called by the radiology staff.      LOCATION:  [place]    Dictated by (CST): Macho Menjivar MD on 5/30/2024 at 1:07 PM     Finalized by (CST): Macho Menjivar MD on 5/30/2024 at 1:18 PM            Douglas Javier MD  6/1/2024

## 2024-06-01 NOTE — PROGRESS NOTES
INFECTIOUS DISEASE PROGRESS NOTE    Citlali Leung Patient Status:  Inpatient    1960 MRN [id number]   [facility] 7NE-A Attending Lin Seymour MD   Hosp Day # 2 PCP Janel Hendrickson     Abx: IV Zosyn D#1    Subjective: Patient seen and examined today. Intermittent abdominal discomfort. No other complaints. She reports this is stable. Denies any nausea or vomiting. Afebrile.     Allergies:  Allergies   Allergen Reactions    Naproxen OTHER (SEE COMMENTS)    Omeprazole HIVES and ITCHING    Ondansetron OTHER (SEE COMMENTS)     Patient states she can take without difficulty      Radiology Contrast Iodinated Dyes ITCHING     DYE from CT Scan causes itching per Patient.    CT contrast, per patient CT was stopped and needed Benadryl during procedure. C/O itching   DYE from CT Scan causes itching per Patient.    Iodine ITCHING       Medications:    Current Facility-Administered Medications:     acetaminophen (Tylenol) tab 650 mg, 650 mg, Oral, Q6H PRN    amLODIPine (Norvasc) tab 2.5 mg, 2.5 mg, Oral, Nightly    amLODIPine (Norvasc) tab 5 mg, 5 mg, Oral, QAM    fluticasone furoate-vilanterol (Breo Ellipta) 200-25 MCG/ACT inhaler 1 puff, 1 puff, Inhalation, Daily    DULoxetine (Cymbalta) DR cap 60 mg, 60 mg, Oral, Daily    fentaNYL (Duragesic) 12 MCG/HR patch 1 patch, 1 patch, Transdermal, Q3 Days    fentaNYL (Duragesic) 25 MCG/HR patch 1 patch, 1 patch, Transdermal, Q3 Days    gabapentin (Neurontin) cap 300 mg, 300 mg, Oral, TID    HYDROmorphone (Dilaudid) tab 4 mg, 4 mg, Oral, Q4H PRN    hydrOXYzine (Atarax) tab 25 mg, 25 mg, Oral, BID PRN    ipratropium-albuterol (Duoneb) 0.5-2.5 (3) MG/3ML inhalation solution 3 mL, 3 mL, Nebulization, Q6H PRN    levothyroxine (Synthroid) tab 175 mcg, 175 mcg, Oral, Before breakfast    losartan (Cozaar) tab 100 mg, 100 mg, Oral, Daily    sodium chloride 0.9% infusion, , Intravenous, Continuous    heparin (Porcine)  5000 UNIT/ML injection 5,000 Units, 5,000 Units, Subcutaneous, Q8H ADELIA    acetaminophen (Tylenol Extra Strength) tab 500 mg, 500 mg, Oral, Q4H PRN    prochlorperazine (Compazine) 10 MG/2ML injection 5 mg, 5 mg, Intravenous, Q8H PRN    piperacillin-tazobactam (Zosyn) 3.375 g in dextrose 5% 100 mL IVPB-ADDV, 3.375 g, Intravenous, Q8H  Current Facility-Administered Medications on File Prior to Encounter   Medication Dose Route Frequency Provider Last Rate Last Admin    [COMPLETED] potassium chloride (Klor-Con M20) tab 40 mEq  40 mEq Oral Q4H Blane Henderson MD   40 mEq at 05/15/24 1339    [COMPLETED] potassium chloride (Klor-Con M20) tab 20 mEq  20 mEq Oral Once Christian Mary MD   20 mEq at 24 1124    [COMPLETED] potassium chloride (Klor-Con M20) tab 40 mEq  40 mEq Oral Q4H Terrance Velazquez MD   40 mEq at 24 1300    [] potassium chloride (Klor-Con M20) tab 40 mEq  40 mEq Oral Q4H Terrance Velazquez MD   40 mEq at 24 2313    [COMPLETED] heparin (Porcine) 5000 UNIT/ML injection 5,000 Units  5,000 Units Subcutaneous Once Christian Mary MD   5,000 Units at 05/10/24 0654    [COMPLETED] ceFAZolin (Ancef) 2 g in 20mL IV syringe premix  2 g Intravenous Once Christian Mary MD   2 g at 05/10/24 1133    [COMPLETED] iopamidol 76% (ISOVUE-370) injection for power injector  80 mL Intravenous ONCE PRN Kathryn Henderson PA   80 mL at 24 1404    [COMPLETED] gadoterate meglumine (Dotarem) 10 MMOL/20ML injection 20 mL  20 mL Intravenous ONCE PRN Kathryn Henderson PA   20 mL at 24 1522    [COMPLETED] gadoterate meglumine (Dotarem) 10 MMOL/20ML injection 20 mL  20 mL Intravenous ONCE PRN Kathryn Henderson PA   20 mL at 24 1631     Current Outpatient Medications on File Prior to Encounter   Medication Sig Dispense Refill    enoxaparin 40 MG/0.4ML Injection Solution Prefilled Syringe Inject 0.4 mL (40 mg total) into the skin daily. 22 each 0    levothyroxine 175 MCG Oral Tab Take 1 tablet (175 mcg  total) by mouth before breakfast.      amLODIPine 2.5 MG Oral Tab Take 2 tablets (5 mg total) by mouth every morning.      amLODIPine 2.5 MG Oral Tab Take 1 tablet (2.5 mg total) by mouth at bedtime.      acetaminophen 325 MG Oral Tab Take 2 tablets (650 mg total) by mouth every 6 (six) hours as needed.      DULoxetine 60 MG Oral Cap DR Particles TAKE 1 CAPSULE BY MOUTH DAILY FOR CHRONIC MUSCLE OR BONE PAIN OR NERVE PAIN OR RECURRENT ANXIETY      gabapentin 300 MG Oral Cap Take 1 capsule (300 mg total) by mouth 3 (three) times daily.      hydrOXYzine 25 MG Oral Tab Take 1 tablet (25 mg total) by mouth 2 (two) times daily as needed for Anxiety.      ipratropium-albuterol 0.5-2.5 (3) MG/3ML Inhalation Solution Inhale 3 mL into the lungs every 6 (six) hours as needed.      telmisartan 80 MG Oral Tab Take 0.5-1 tablets (40-80 mg total) by mouth daily.      Budesonide-Formoterol Fumarate (SYMBICORT) 160-4.5 MCG/ACT Inhalation Aerosol Inhale 2 puffs into the lungs 2 (two) times daily.      HYDROmorphone 4 MG Oral Tab Take 1 tablet (4 mg total) by mouth every 4 (four) hours as needed for Pain.      fentaNYL 12 MCG/HR Transdermal Patch 72 Hr Place 1 patch onto the skin every 3 (three) days.      fentaNYL 25 MCG/HR Transdermal Patch 72 Hr Place 1 patch onto the skin every third day.      Naloxone HCl 4 MG/0.1ML Nasal Liquid SPRAY 1 SPRAY IN EACH NOSTRIL FOR OVERDOSE. MAY REPEAT EVERY 2-3 MINUTES IN ALTERNATING NOSTRILS UNTIL MEDICAL ASSISTANCE BECOMES AVAILABLE      ondansetron 4 MG Oral Tablet Dispersible DISSOLVE 1 TABLET ON THE TONGUE THREE TIMES DAILY FOR UP TO 10 DAYS AS NEEDED FOR NAUSEA OR VOMITING      [] amoxicillin clavulanate 875-125 MG Oral Tab Take 1 tablet by mouth 2 (two) times daily for 4 days. 8 tablet 0       Review of Systems:  Completed. See pertinent positives and negatives above.     Physical Exam:  General: No acute distress. Alert and oriented x 3. On room air.   Vital signs: Temp:  [97 °F  (36.1 °C)-99 °F (37.2 °C)] 97 °F (36.1 °C)  Pulse:  [60-78] 60  Resp:  [15-18] 16  BP: (108-150)/(71-95) 119/71  SpO2:  [91 %-97 %] 93 %  Body mass index is 34.31 kg/m².  HEENT: Moist mucous membranes. Extraocular muscles are intact.  Neck: No swelling, no masses  Respiratory: Clear to auscultation bilaterally. No wheezing. No rhonchi.   Cardiovascular: RRR  Abdomen: Soft, nontender, nondistended.  Midline incision without signs of infection.   Musculoskeletal: Movement of all extremities.  No swelling noted.  Joints: no effusions  Skin: No lesions. No erythema, no open wounds    Laboratory Data:  Laboratory data reviewed      Recent Labs   Lab 05/31/24  0604   RBC 3.60*   HGB 10.7*   HCT 33.2*   MCV 92.2   MCH 29.7   MCHC 32.2   RDW 13.2   NEPRELIM 6.65   WBC 8.5   .0       Recent Labs   Lab 05/30/24  1116 05/30/24  1350 05/31/24  0604 06/01/24  0631   * 132* 105* 106*   BUN 9 9 8* 6*   CREATSERUM 0.89 0.80 0.62 0.63   CA 10.8* 10.5* 9.6 10.1   ALB 3.0* 3.1* 2.6*  --     137 140 143   K 3.7 3.4* 3.2* 4.0  4.0    104 110 112   CO2 23.0 22.0 22.0 23.0   ALKPHO 127 126 128  --    AST 18 16 28  --    ALT 15 16 25  --    BILT 0.3 0.5 0.4  --    TP 7.4 7.5 6.6  --               Recent Labs   Lab 05/30/24  1737   COLORUR Yellow   CLARITY Turbid*   SPECGRAVITY 1.029   GLUUR Normal   BILUR Negative   KETUR Trace*   BLOODURINE 1+*   PHURINE 6.0   PROUR 30*   UROBILINOGEN Normal   NITRITE Negative   LEUUR 25*   WBCUR 1-5   RBCUR >10*   BACUR None Seen   EPIUR Few*         Established Problem list:  Patient Active Problem List   Diagnosis    Acute sinusitis, recurrence not specified, unspecified location    Arthritis    Bronchitis    Blepharitis of right lower eyelid    Breast pain, right    Other chronic pain    Chronic midline low back pain with left-sided sciatica    Cervical radiculopathy    Hypertension due to endocrine disorder    Hypokalemia    Insomnia    Left lumbar radiculopathy    Sarcoma  of pelvic peritoneum (HCC)    Fibromyalgia    History of left breast cancer    Hyperlipidemia, unspecified hyperlipidemia type    Hypothyroidism, unspecified type    VANDA (generalized anxiety disorder)    Age-related osteoporosis without current pathological fracture    Abnormal EKG    COPD with acute exacerbation (HCC)    Diarrhea of presumed infectious origin    Disorder of thyroid    Generalized weakness    Hordeolum externum of right lower eyelid    Lumbar facet arthropathy    Osteopenia    Primary hyperparathyroidism (HCC)    Hypertension    Productive cough    Simple chronic bronchitis (HCC)    Vitamin D deficiency    Abdominal pain, acute    Leukocytosis, unspecified type    Hydronephrosis    Acute cystitis without hematuria       ASSESSMENT/PLAN:  1. SP radical resection sarcoma 5/10  Admitted with low grade temp  -CT shows fluid collection and hydro  - and surgical oncology following, notes plans for repeat CT a/p in 2 weeks.   -Ucx negative  -Bcxs ngtd  -follow temps and wbc--> no further fevers, wbc wnl   -continue Zosyn while here. Ok for dc from ID standpoint on po cipro x 2 weeks    Discussed case with RN and Dr. Albert.     Christianne Sherman PA-C

## 2024-06-01 NOTE — PROGRESS NOTES
[facility]   part of [facility]     Hospitalist Progress Note     Citlali Leung Patient Status:  Inpatient    1960 MRN [id number]   Location [facility] 7NE-A Attending Lin Seymour MD   Hosp Day # 2 PCP Janel Montenegrokwube     Chief Complaint: UTI     Subjective:     Patient with no fever, chills, N/V/D.     Objective:    Review of Systems:   A comprehensive review of systems was completed; pertinent positive and negatives stated in subjective.    Vital signs:  Temp:  [97 °F (36.1 °C)-99 °F (37.2 °C)] 98.2 °F (36.8 °C)  Pulse:  [60-78] 61  Resp:  [16-19] 19  BP: (108-150)/(57-95) 131/57  SpO2:  [76 %-97 %] 76 %    Physical Exam:    General: No acute distress  Respiratory: No wheezes, no rhonchi  Cardiovascular: S1, S2, regular rate and rhythm  Abdomen: Soft, Non-tender, non-distended, positive bowel sounds  Neuro: No new focal deficits.   Extremities: No edema      Diagnostic Data:    Labs:  Recent Labs   Lab 24  1116 24  1350 24  0604   WBC 21.9* 21.0* 8.5   HGB 12.7 12.4 10.7*   MCV 91.6 89.3 92.2   .0 397.0 311.0       Recent Labs   Lab 24  1116 24  1350 24  0604 24  0631   * 132* 105* 106*   BUN 9 9 8* 6*   CREATSERUM 0.89 0.80 0.62 0.63   CA 10.8* 10.5* 9.6 10.1   ALB 3.0* 3.1* 2.6*  --     137 140 143   K 3.7 3.4* 3.2* 4.0  4.0    104 110 112   CO2 23.0 22.0 22.0 23.0   ALKPHO 127 126 128  --    AST 18 16 28  --    ALT 15 16 25  --    BILT 0.3 0.5 0.4  --    TP 7.4 7.5 6.6  --        Estimated Creatinine Clearance: 74.2 mL/min (based on SCr of 0.63 mg/dL).    No results for input(s): \"TROP\", \"TROPHS\", \"CK\" in the last 168 hours.    No results for input(s): \"PTP\", \"INR\" in the last 168 hours.               Microbiology    Hospital Encounter on 24   1. Urine Culture, Routine     Status: None    Collection Time: 24  5:37 PM    Specimen: Urine, clean catch   Result Value Ref Range    Urine Culture No Growth 2  Days N/A   2. Blood Culture     Status: None (Preliminary result)    Collection Time: 05/30/24  1:51 PM    Specimen: Blood,peripheral   Result Value Ref Range    Blood Culture Result No Growth 1 Day N/A         Imaging: Reviewed in Epic.    Medications:    amLODIPine  2.5 mg Oral Nightly    amLODIPine  5 mg Oral QAM    fluticasone furoate-vilanterol  1 puff Inhalation Daily    DULoxetine  60 mg Oral Daily    fentaNYL  1 patch Transdermal Q3 Days    fentaNYL  1 patch Transdermal Q3 Days    gabapentin  300 mg Oral TID    levothyroxine  175 mcg Oral Before breakfast    losartan  100 mg Oral Daily    heparin  5,000 Units Subcutaneous Q8H ADELIA    piperacillin-tazobactam  3.375 g Intravenous Q8H       Assessment & Plan:      #Febrile illness  - cultures taken in ER and pending , NGTD  - cont zosyn for now   - ID, urology, surgery rec appreciated      # mild hydronephrosis   - urology eval noted      # possible UTI   - cont abx      # hypokalemia , replaced  # mild anemia , monitor   # hypercalcemia, resoled   # 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     Lin Seymour MD    Supplementary Documentation:     Quality:  DVT Mechanical Prophylaxis:   SCDs,    DVT Pharmacologic Prophylaxis   Medication    heparin (Porcine) 5000 UNIT/ML injection 5,000 Units                Code Status: Not on file  Aquino: External urinary catheter in place  Aquino Duration (in days):   Central line:    DAV:     Discharge is dependent on: course  At this point Ms. Leung is expected to be discharge to: home     The 21st Century Cures Act makes medical notes like these available to patients in the interest of transparency. Please be advised this is a  medical document. Medical documents are intended to carry relevant information, facts as evident, and the clinical opinion of the practitioner. The medical note is intended as peer to peer communication and may appear blunt or direct. It is written in medical language and may contain abbreviations or verbiage that are unfamiliar.

## 2024-06-03 ENCOUNTER — TELEPHONE (OUTPATIENT)
Dept: SURGERY | Facility: CLINIC | Age: 64
End: 2024-06-03

## 2024-06-03 NOTE — TELEPHONE ENCOUNTER
Patient not answering calls and no voicemail.  Called sister Arminda and left voicemail checking on patient and making sure follow ups scheduled with ID and urology.

## 2024-06-03 NOTE — TELEPHONE ENCOUNTER
Can you set up for follow up with me or jose ramon in 2 wks in clinic with CT done prior? Thanks. CT order placed already by Dr. Javier.

## 2024-06-06 ENCOUNTER — TELEPHONE (OUTPATIENT)
Dept: SURGERY | Facility: CLINIC | Age: 64
End: 2024-06-06

## 2024-06-06 NOTE — TELEPHONE ENCOUNTER
Received fax from [facility] with approval of CPT 82202 CT abdomen and pelvis without contrast. Effective from 6/6/24-8/4/24. Will send copy to scanning.

## 2024-06-07 ENCOUNTER — MED REC SCAN ONLY (OUTPATIENT)
Dept: SURGERY | Facility: CLINIC | Age: 64
End: 2024-06-07

## 2024-06-12 ENCOUNTER — HOSPITAL ENCOUNTER (OUTPATIENT)
Dept: CT IMAGING | Facility: HOSPITAL | Age: 64
Discharge: HOME | End: 2024-06-12
Attending: UROLOGY
Payer: COMMERCIAL

## 2024-06-12 ENCOUNTER — PATIENT OUTREACH (OUTPATIENT)
Dept: INFECTIOUS DISEASE | Facility: CLINIC | Age: 64
End: 2024-06-12

## 2024-06-12 DIAGNOSIS — N13.39 OTHER HYDRONEPHROSIS: ICD-10-CM

## 2024-06-12 PROCEDURE — 74176 CT ABD & PELVIS W/O CONTRAST: CPT | Performed by: UROLOGY

## 2024-06-12 NOTE — PROGRESS NOTES
She had been given cipro on discharge, but stopped, due to concerns she had that could be having side effect.  She had left leg pain and numbness, following with primary.  Repeat UA reflex culture prior to  procedure if needed.

## 2024-06-14 ENCOUNTER — TELEPHONE (OUTPATIENT)
Dept: SURGERY | Facility: CLINIC | Age: 64
End: 2024-06-14

## 2024-06-14 NOTE — TELEPHONE ENCOUNTER
Called pt to check in on her per Chanel ETIENNE, pt states she is doing well, she is having a bit of abdominal pain, she was having a difficult time walking but she states she has been able to walk more and more now. She was having issues with urinating but it is going back to normal but she hasn't had a BM. Overall pt states she is doing well. I had asked her is she has any questions or concerns for Chanel, as of right now she doesn't but if she does she was instructed to call our office. Chanel ETIENNE has been notified.

## 2024-06-20 ENCOUNTER — TELEPHONE (OUTPATIENT)
Dept: SURGERY | Facility: CLINIC | Age: 64
End: 2024-06-20

## 2024-06-20 NOTE — TELEPHONE ENCOUNTER
Patient called to go over CT scan. She did mention her left leg has been swelling intermittently since surgery. Denies pain, redness. Informed patient to go to ED if there are worsening symptoms. Patient has been routinely doing lovenox injections since her last visit. She has her post-op appointment with neurosurgery on 6/25.

## 2024-06-21 ENCOUNTER — OFFICE VISIT (OUTPATIENT)
Dept: SURGERY | Facility: CLINIC | Age: 64
End: 2024-06-21

## 2024-06-21 ENCOUNTER — TELEPHONE (OUTPATIENT)
Dept: SURGERY | Facility: CLINIC | Age: 64
End: 2024-06-21

## 2024-06-21 VITALS — DIASTOLIC BLOOD PRESSURE: 81 MMHG | OXYGEN SATURATION: 94 % | HEART RATE: 88 BPM | SYSTOLIC BLOOD PRESSURE: 133 MMHG

## 2024-06-21 DIAGNOSIS — M79.89 MASS OF SOFT TISSUE OF PELVIS: Primary | ICD-10-CM

## 2024-06-21 PROCEDURE — 3075F SYST BP GE 130 - 139MM HG: CPT | Performed by: PHYSICIAN ASSISTANT

## 2024-06-21 PROCEDURE — 3079F DIAST BP 80-89 MM HG: CPT | Performed by: PHYSICIAN ASSISTANT

## 2024-06-21 PROCEDURE — 99024 POSTOP FOLLOW-UP VISIT: CPT | Performed by: PHYSICIAN ASSISTANT

## 2024-06-21 NOTE — PROGRESS NOTES
[facility] Surgical Oncology        Patient Name:  Citlali Leung   YOB: 1960   Gender:  Female   Appt Date:  6/21/2024   Provider:  JAIMIE Acosta     PATIENT PROVIDERS  Referring Provider: Diana Dickey MD     Primary Care Provider:Janel Hendrickson MD  Address: [address] [place] [address]   Phone #: [phone]       CHIEF COMPLAINT  Chief Complaint   Patient presents with    Post-Op        PROBLEMS  Reviewed   Patient Active Problem List   Diagnosis    Acute sinusitis, recurrence not specified, unspecified location    Arthritis    Bronchitis    Blepharitis of right lower eyelid    Breast pain, right    Other chronic pain    Chronic midline low back pain with left-sided sciatica    Cervical radiculopathy    Hypertension due to endocrine disorder    Hypokalemia    Insomnia    Left lumbar radiculopathy    Sarcoma of pelvic peritoneum (HCC)    Fibromyalgia    History of left breast cancer    Hyperlipidemia, unspecified hyperlipidemia type    Hypothyroidism, unspecified type    VANDA (generalized anxiety disorder)    Age-related osteoporosis without current pathological fracture    Abnormal EKG    COPD with acute exacerbation (HCC)    Diarrhea of presumed infectious origin    Disorder of thyroid    Generalized weakness    Hordeolum externum of right lower eyelid    Lumbar facet arthropathy    Osteopenia    Primary hyperparathyroidism (HCC)    Hypertension    Productive cough    Simple chronic bronchitis (HCC)    Vitamin D deficiency    Abdominal pain, acute    Leukocytosis, unspecified type    Hydronephrosis    Acute cystitis without hematuria        History of Present Illness:  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  Patient has history of breast cancer in 2003, s/p surgery, RT, chemo and 5 years of  tamoxifen.     11/14/2023: Patient began having left lower abdominal pain with nausea and vomiting.  She went to the ED in November where CT abdomen pelvis was performed showing irregular soft tissue mass in the posterior left pelvis measuring 2.5 x 4.3 cm.  Mass is closely associated with the left internal iliac vessels and left S2 nerve root.     11/15/2023: MRI performed showing 3.0 x 4.2 x 3.6 cm heterogenously enhancing mass inseparable from the left internal iliac vessels and encasing left exiting S2 nerve root.   CA-125: 33.0  CEA: 2.5  CA 19-9: 4.6  CA 27.29: 11  CA 15-3: 8     11/16/2023: CT chest with small right upper lobe nodule.  No comparison.      11/17/2023: Patient underwent CT-guided biopsy of left pelvic mass --> spindle cell neoplasm with features of leiomyoma with atypical nuclei, atypical smooth muscle neoplasm, MSI stable 7     11/21/2023: Repeat CT abdomen pelvis with IV contrast confirms enhancing solid mass in left posterior pelvis measuring 4.3 cm concerning for neoplasm     12/1/2023: Repeat CT abdomen pelvis redemonstrating solid mass, no significant change from prior     12/3/2023: CT brain without contrast performed due to vomiting, WNL     12/15/2023: Patient seen by Dr Jamil NM Surg Onc and Dr Horton [facility] Heme Onc, tumor deemed unresectable, ER/AK negative. Recommended ablative dose proton therapy     12/18/2023: Re-admitted for N/V. Repeat CT A/P, stable mass  12/21/2023: EGD performed while admitted showing peptic appearing duodenitis and gastritis, and gallego's esophagus     Surgical history includes cholecystectomy, hysterectomy, L3-4 left hemilamectomy, and left mastectomy. Patient has a family history of daughter with breast cancer, son with colorectal cancer, and 3 maternal aunts with breast cancer. She had genetic testing outpatient 20 years ago when she was diagnosed with breast cancer. She was diagnosed in 2005.      Patient underwent radiation to the mass with Dr Gibson for  6 weeks which was finished on 2/26/2024. She underwent post radiation imaging, summarized below. She has been having increased constipation. She has occasional numbness in the left leg. She is able to walk but develops significant pain after walking 1.5 blocks.      Preoperatively, patient was taking fentanyl patch 37.5 mg every 3 days, hydromorphone 4 mg every 4-6 hours, gabapentin 300 mg three times a day, tizanidine 4 mg nightly, duloxetine 60 mg, and tylenol 1000 mg daily. Patient also has an implanted pain pump.     Interval History:  5/10/2024: s/p radical resection sarcoma left pelvis with resection of left internal iliac artery and vein. Exclusion of small bowel with omentum. Use of Plasma Jet to potentiate posterior margin, intraoperative neuro monitoring     5/29/2024: Called patient to set up post op appointment as she did not come to office on Tuesday due to constipation. Patient denied fevers or chills at the time. Pain was slightly increased but maintained. Denied nausea or vomiting. Scheduled for appointment on 5/30. Patient then reported to the ED late at night due to feeling feverish and chills. Spouse states temperature was less than 100. Patient left ED due to pain.     5/30/2024: Patient states new onset nausea, vomiting and fever started 5/29 at 9:30 at night. She reported increased left leg pain. She states she vomited 2-3 times, she is unsure of the color. She states she had fever of 101 at home and felt feverish. She did note an episode of shortness of breath that improved after using her inhaler.  She has had no additional episodes of vomiting. She had a loose bowel movement this morning but has been dealing with constipation earlier in the week.     06/21/2024: Patient came to office due to continuous left leg pain. She describes sharp pains that run down her leg and throbbing. Also experiences left foot paresthesia. She states this pain has been ongoing since surgery. She takes  hydromorphone every four hours. Endorses swelling fluctuations of left leg. She has been laying in bed for the past couple of days, minimal ambulation. She has been using walker. She did finish her 28 day course of lovenox. Scheduled post-op with neurosurgery on 06/24.     Patient states she experiences burning with urination. Patient seen by medical oncologist. She denies blood in urine. Her oncologist ordered a UA that noted some rbc. CBC shows no signs of infection. Patient states she has been nauseas, but states she has been hydrating more frequently and this has been helping ease her nausea.     Vital Signs:  /81 (BP Location: Right arm, Patient Position: Sitting)   Pulse 88   SpO2 94%      Medications Reviewed:    Current Outpatient Medications:     enoxaparin 40 MG/0.4ML Injection Solution Prefilled Syringe, Inject 0.4 mL (40 mg total) into the skin daily., Disp: 22 each, Rfl: 0    levothyroxine 175 MCG Oral Tab, Take 1 tablet (175 mcg total) by mouth before breakfast., Disp: , Rfl:     amLODIPine 2.5 MG Oral Tab, Take 2 tablets (5 mg total) by mouth every morning., Disp: , Rfl:     amLODIPine 2.5 MG Oral Tab, Take 1 tablet (2.5 mg total) by mouth at bedtime., Disp: , Rfl:     acetaminophen 325 MG Oral Tab, Take 2 tablets (650 mg total) by mouth every 6 (six) hours as needed., Disp: , Rfl:     DULoxetine 60 MG Oral Cap DR Particles, TAKE 1 CAPSULE BY MOUTH DAILY FOR CHRONIC MUSCLE OR BONE PAIN OR NERVE PAIN OR RECURRENT ANXIETY, Disp: , Rfl:     gabapentin 300 MG Oral Cap, Take 1 capsule (300 mg total) by mouth 3 (three) times daily., Disp: , Rfl:     hydrOXYzine 25 MG Oral Tab, Take 1 tablet (25 mg total) by mouth 2 (two) times daily as needed for Anxiety., Disp: , Rfl:     ipratropium-albuterol 0.5-2.5 (3) MG/3ML Inhalation Solution, Inhale 3 mL into the lungs every 6 (six) hours as needed., Disp: , Rfl:     telmisartan 80 MG Oral Tab, Take 0.5-1 tablets (40-80 mg total) by mouth daily., Disp: ,  Rfl:     Budesonide-Formoterol Fumarate (SYMBICORT) 160-4.5 MCG/ACT Inhalation Aerosol, Inhale 2 puffs into the lungs 2 (two) times daily., Disp: , Rfl:     HYDROmorphone 4 MG Oral Tab, Take 1 tablet (4 mg total) by mouth every 4 (four) hours as needed for Pain., Disp: , Rfl:     fentaNYL 12 MCG/HR Transdermal Patch 72 Hr, Place 1 patch onto the skin every 3 (three) days., Disp: , Rfl:     fentaNYL 25 MCG/HR Transdermal Patch 72 Hr, Place 1 patch onto the skin every third day., Disp: , Rfl:     Naloxone HCl 4 MG/0.1ML Nasal Liquid, SPRAY 1 SPRAY IN EACH NOSTRIL FOR OVERDOSE. MAY REPEAT EVERY 2-3 MINUTES IN ALTERNATING NOSTRILS UNTIL MEDICAL ASSISTANCE BECOMES AVAILABLE, Disp: , Rfl:     ondansetron 4 MG Oral Tablet Dispersible, DISSOLVE 1 TABLET ON THE TONGUE THREE TIMES DAILY FOR UP TO 10 DAYS AS NEEDED FOR NAUSEA OR VOMITING, Disp: , Rfl:      Allergies Reviewed:  Allergies   Allergen Reactions    Naproxen OTHER (SEE COMMENTS)    Omeprazole HIVES and ITCHING    Ondansetron OTHER (SEE COMMENTS)     Patient states she can take without difficulty      Radiology Contrast Iodinated Dyes ITCHING     DYE from CT Scan causes itching per Patient.    CT contrast, per patient CT was stopped and needed Benadryl during procedure. C/O itching   DYE from CT Scan causes itching per Patient.    Iodine ITCHING        History:  Reviewed:  Past Medical History:    Cancer (HCC)    Disorder of thyroid    Exposure to medical diagnostic radiation    last tx 2/26/2024    High blood pressure    Hx of motion sickness    Visual impairment    glasses      Reviewed:  Past Surgical History:   Procedure Laterality Date    Cholecystectomy      Hysterectomy      Mastectomy left      Other surgical history      left hemilaminectomy L3-L4      Reviewed Social History:  Social History     Socioeconomic History    Marital status:    Tobacco Use    Smoking status: Former     Current packs/day: 0.00     Types: Cigarettes     Quit date: 2005      Years since quittin.4    Smokeless tobacco: Never   Vaping Use    Vaping status: Never Used   Substance and Sexual Activity    Alcohol use: Not Currently    Drug use: Not Currently     Types: Cannabis     Social Determinants of Health     Food Insecurity: No Food Insecurity (2024)    Food Insecurity     Food Insecurity: Never true   Transportation Needs: No Transportation Needs (2024)    Transportation Needs     Lack of Transportation: No   Housing Stability: Low Risk  (2024)    Housing Stability     Housing Instability: No      Reviewed:  Family History   Problem Relation Age of Onset    Breast Cancer Daughter     Other (Colorectal Cancer) Son     Breast Cancer Maternal Aunt     Breast Cancer Maternal Aunt     Breast Cancer Maternal Aunt       Review of Systems:  GENERAL HEALTH: feels well, no fatigue.   RESPIRATORY: denies shortness of breath, wheezing or cough   CARDIOVASCULAR: denies chest pain, SOB, edema,orthopnea, no palpitations   GI: denies nausea, vomiting, constipation, +diarrhea; no rectal bleeding  GENITAL/: no blood in urine, +burning with urination  NEURO: Endorses tingling of left foot, endorses pain of left extremity  ENDOCRINE: denies weight loss/gain       Physical Examination:  Constitutional: General Appearance: healthy-appearing, well-nourished, and well-developed. Level of Distress: NAD.   Eyes: Sclera: non-icteric.   Lungs: No increased work of breathing   Abdomen: Non-distended on exam.   Musculoskeletal: Extremities: no edema or erythema noted bilaterally. Pain on palpation of lateral aspect of left leg.   Skin: Inspection and palpation: no jaundice.      Document Review:  Lab work faxed over from Dr. Dickey reviewed- fax scanned into chart.     CT abdomen and pelvis:   KIDNEYS:  There is no significant change in mild left hydroureteronephrosis.  This may be related to mass effect on the distal left ureter.      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 Patient will follow-up with urology on 06/25 regarding urinary symptoms and stable left hydroureteronephrosis noted on CT. Possible stent placement due to ongoing symptoms, appreciate urology recommendations.   - Patient is being referred to neurosurgery for post op appointment on 06/24 due to ongoing left leg pain. Low clinical suspicion of DVT, patient aware of warning signs. Patient does not believe a doppler exam is necessary at the moment.   - Appreciate oncologist recommendations for CT C/A/P every 3 months  - Patient aware if there are any significant changes in health to call our office or go to ED. She understood and agreed with our plan.         Electronically Signed by: JAIMIE Acosta

## 2024-06-21 NOTE — TELEPHONE ENCOUNTER
Patient sister is aware of the CT scan results. Hydro is stable on CT scan, but patient is still having symptoms of nausea, urgency, frequency, and burning when she urinates. She states at her oncologist visit(Dr. Dickey), they did a UA, which was unremarkable. They also did lab work that revealed mild dehydration. From original hospitalization, patient had been prescribed cipro, but she stopped taking this due to mild rxn to medication.

## 2024-06-24 ENCOUNTER — OFFICE VISIT (OUTPATIENT)
Dept: SURGERY | Facility: CLINIC | Age: 64
End: 2024-06-24

## 2024-06-24 VITALS
HEIGHT: 62 IN | HEART RATE: 100 BPM | BODY MASS INDEX: 34.41 KG/M2 | SYSTOLIC BLOOD PRESSURE: 140 MMHG | WEIGHT: 187 LBS | DIASTOLIC BLOOD PRESSURE: 92 MMHG

## 2024-06-24 DIAGNOSIS — M54.16 LUMBAR RADICULOPATHY: Primary | ICD-10-CM

## 2024-06-24 PROCEDURE — 3077F SYST BP >= 140 MM HG: CPT | Performed by: NEUROLOGICAL SURGERY

## 2024-06-24 PROCEDURE — 99024 POSTOP FOLLOW-UP VISIT: CPT | Performed by: NEUROLOGICAL SURGERY

## 2024-06-24 PROCEDURE — 3008F BODY MASS INDEX DOCD: CPT | Performed by: NEUROLOGICAL SURGERY

## 2024-06-24 PROCEDURE — 3080F DIAST BP >= 90 MM HG: CPT | Performed by: NEUROLOGICAL SURGERY

## 2024-06-24 NOTE — PROGRESS NOTES
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  She states she continues to have LLE radiculopathy which she feels has been present for years prior to surgery.  She follows with a pain specialist and has had a spinal cord stimulator.    O: AVSS   Neuro: stable    A/P s/p resection of soft tissue sarcoma  -reviwed MRI L spine  -no surgical recommendation  -referral to PT  -F/U prn

## 2024-06-25 ENCOUNTER — OFFICE VISIT (OUTPATIENT)
Dept: SURGERY | Facility: CLINIC | Age: 64
End: 2024-06-25

## 2024-06-25 ENCOUNTER — LAB ENCOUNTER (OUTPATIENT)
Dept: LAB | Age: 64
End: 2024-06-25

## 2024-06-25 DIAGNOSIS — R31.29 MICROHEMATURIA: ICD-10-CM

## 2024-06-25 DIAGNOSIS — N13.30 HYDRONEPHROSIS, UNSPECIFIED HYDRONEPHROSIS TYPE: Primary | ICD-10-CM

## 2024-06-25 DIAGNOSIS — R82.90 URINE FINDING: ICD-10-CM

## 2024-06-25 LAB
ANION GAP SERPL CALC-SCNC: 5 MMOL/L (ref 0–18)
APPEARANCE: CLEAR
BILIRUBIN: NEGATIVE
BUN BLD-MCNC: 7 MG/DL (ref 9–23)
CALCIUM BLD-MCNC: 10.2 MG/DL (ref 8.5–10.1)
CHLORIDE SERPL-SCNC: 110 MMOL/L (ref 98–112)
CO2 SERPL-SCNC: 27 MMOL/L (ref 21–32)
CREAT BLD-MCNC: 0.71 MG/DL
EGFRCR SERPLBLD CKD-EPI 2021: 97 ML/MIN/1.73M2 (ref 60–?)
FASTING STATUS PATIENT QL REPORTED: NO
GLUCOSE (URINE DIPSTICK): NEGATIVE MG/DL
GLUCOSE BLD-MCNC: 112 MG/DL (ref 70–99)
KETONES (URINE DIPSTICK): NEGATIVE MG/DL
LEUKOCYTES: NEGATIVE
MULTISTIX LOT#: ABNORMAL NUMERIC
NITRITE, URINE: NEGATIVE
OSMOLALITY SERPL CALC.SUM OF ELEC: 293 MOSM/KG (ref 275–295)
PH, URINE: 6 (ref 4.5–8)
POTASSIUM SERPL-SCNC: 3.8 MMOL/L (ref 3.5–5.1)
PROTEIN (URINE DIPSTICK): NEGATIVE MG/DL
SODIUM SERPL-SCNC: 142 MMOL/L (ref 136–145)
SPECIFIC GRAVITY: 1.02 (ref 1–1.03)
URINE-COLOR: YELLOW
UROBILINOGEN,SEMI-QN: 0.2 MG/DL (ref 0–1.9)

## 2024-06-25 PROCEDURE — 80048 BASIC METABOLIC PNL TOTAL CA: CPT

## 2024-06-25 PROCEDURE — 99214 OFFICE O/P EST MOD 30 MIN: CPT

## 2024-06-25 PROCEDURE — 81003 URINALYSIS AUTO W/O SCOPE: CPT

## 2024-06-25 PROCEDURE — 51798 US URINE CAPACITY MEASURE: CPT

## 2024-06-25 PROCEDURE — 36415 COLL VENOUS BLD VENIPUNCTURE: CPT

## 2024-06-25 NOTE — PROGRESS NOTES
Mssg sent to dr. Mary's office. BMP normal. Will await urine culture results. Hold off on stent consideration currently.    Repeat KBUS and BMP in 6mos. If HUN stable, can f/u prn. If cr elevating, more frequent UTI or worsening flank pain, can consider stent placement sooner.

## 2024-06-26 NOTE — PROGRESS NOTES
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 Mary on 5/10/24 who presented to the ER with nausea vomiting and fever on 5/30/2024.     Urology was consulted at the time for mild left hydronephrosis and hydroureter likely due to postoperative fluid collection per CT report. Renal function was normal at the time.  Creatinine 0.62 and baseline runs 0.5 2.7.  No complaints of flank pain, difficulty voiding, urgency, or frequency.  Urine culture was also negative at the time.    She presents today for follow up. Repeat CT A/P done prior to visit showed no significant changes to left-sided hydroureteronephrosis.     She denies any new onset urinary sx. She was seen by surgonc recently and c/o some dysuria but admits to severe dehydration on that day. After fluid intake, this seems to have resolved. UA today trace intact blood, otherwise neg.     She admits to some urgency and frequency but this is her norm and not bothersome. She admits to some new left sided flank pain that is intermittent but very mild and not bothersome. Attributes this partially to her constipation.     BM has been constipated and she notes that her frequency and urgency also gets worse when this happens. Currently on stool softener and fiber.     HISTORY:  Past Medical History:    Cancer (HCC)    Disorder of thyroid    Exposure to medical diagnostic radiation    last tx 2/26/2024    High blood pressure    Hx of motion sickness    Visual impairment    glasses      Past Surgical History:   Procedure Laterality Date    Cholecystectomy      Hysterectomy      Mastectomy left      Other surgical history      left hemilaminectomy L3-L4      Family History   Problem Relation Age of Onset    Breast Cancer Daughter     Other (Colorectal  Cancer) Son     Breast Cancer Maternal Aunt     Breast Cancer Maternal Aunt     Breast Cancer Maternal Aunt       Social History:   Social History     Socioeconomic History    Marital status:    Tobacco Use    Smoking status: Former     Current packs/day: 0.00     Types: Cigarettes     Quit date:      Years since quittin.4    Smokeless tobacco: Never   Vaping Use    Vaping status: Never Used   Substance and Sexual Activity    Alcohol use: Not Currently    Drug use: Not Currently     Types: Cannabis     Social Determinants of Health     Food Insecurity: No Food Insecurity (2024)    Food Insecurity     Food Insecurity: Never true   Transportation Needs: No Transportation Needs (2024)    Transportation Needs     Lack of Transportation: No   Housing Stability: Low Risk  (2024)    Housing Stability     Housing Instability: No        Allergies  Allergies   Allergen Reactions    Naproxen OTHER (SEE COMMENTS)    Omeprazole HIVES and ITCHING    Ondansetron OTHER (SEE COMMENTS)     Patient states she can take without difficulty      Radiology Contrast Iodinated Dyes ITCHING     DYE from CT Scan causes itching per Patient.    CT contrast, per patient CT was stopped and needed Benadryl during procedure. C/O itching   DYE from CT Scan causes itching per Patient.    Iodine ITCHING       Review of Systems:   A 10-point review of systems was completed and is negative other than as noted above.    Physical Exam:   There were no vitals taken for this visit.    GENERAL APPEARANCE: no acute distress  NEUROLOGIC: converses appropriately  HEAD: atraumatic, normocephalic  LUNGS: non-labored breathing  ABDOMEN: soft, nontender, non-distended  BACK: no CVA tenderness  PSYCH: appropriate affect and mood    Results:     Laboratory Data:  Lab Results   Component Value Date    WBC 8.5 2024    HGB 10.7 (L) 2024    .0 2024     Lab Results   Component Value Date     2024    K 3.8  06/25/2024     06/25/2024    CO2 27.0 06/25/2024    BUN 7 (L) 06/25/2024     (H) 06/25/2024    AST 28 05/31/2024    ALT 25 05/31/2024    TP 6.6 05/31/2024    ALB 2.6 (L) 05/31/2024    PHOS 2.6 05/11/2024    CA 10.2 (H) 06/25/2024    MG 2.2 05/31/2024       Urinalysis Results (last 3 years):  Recent Labs     05/12/24  1927 05/30/24  1737 06/25/24  1249 06/25/24  1341   COLORUR Yellow Yellow  --   --    CLARITY Clear Turbid*  --   --    SPECGRAVITY 1.024 1.029 1.020  --    PHURINE 5.5 6.0 6.0  --    PROUR Negative 30*  --   --    GLUUR Normal Normal  --   --    KETUR 10* Trace*  --   --    BILUR Negative Negative  --   --    BLOODURINE Negative 1+*  --   --    NITRITE Negative Negative Negative  --    UROBILINOGEN Normal Normal  --   --    LEUUR Negative 25*  --   --    WBCUR  --  1-5  --  1-5   RBCUR  --  >10*  --  0-2   BACUR  --  None Seen  --  None Seen       Urine Culture Results (last 3 years):  Lab Results   Component Value Date    URINECUL No Growth 2 Days 05/30/2024       Imaging  CT ABDOMEN+PELVIS KIDNEYSTONE 2D RNDR(NO IV,NO ORAL)(CPT=74176)    Result Date: 6/12/2024  PROCEDURE:  CT ABDOMEN+PELVIS KIDNEYSTONE 2D RNDR(NO IV,NO ORAL)(CPT=74176)  COMPARISON:  [facility] , CT, CT ABDOMEN+PELVIS(CPT=74176), 5/30/2024, 4:25 PM.  INDICATIONS:  N13.39 Other hydronephrosis  TECHNIQUE:  Unenhanced multislice CT scanning from above the kidneys to below the urinary bladder.  2D rendering are generated on the CT scanner workstation to localize potential stones in the cranio-caudal plane.  Dose reduction techniques were used. Dose information is transmitted to the ACR (American College of Radiology) NRDR (National Radiology Data Registry) which includes the Dose Index Registry.  PATIENT STATED HISTORY: (As transcribed by Technologist)  Patient has bilateral lower quadrant pain with abscess.    FINDINGS:  Please note the exam is somewhat limited without intravenous or oral contrast.  KIDNEYS:  There is no  significant change in mild left hydroureteronephrosis.  This may be related to mass effect on the distal left ureter. BLADDER:  The urinary bladder is decompressed which limits assessment. ADRENALS:  No mass or enlargement.  LIVER:  No enlargement, atrophy, abnormal density, or significant focal lesion.  BILIARY:  Postsurgical changes of cholecystectomy are noted. PANCREAS:  No lesion, fluid collection, ductal dilatation, or atrophy.  SPLEEN:  No enlargement or focal lesion.  AORTA/VASCULAR:  No aneurysm.  RETROPERITONEUM:  No mass or adenopathy.  BOWEL/MESENTERY:  No dilated loops of small bowel are seen.  Portions of the bowel are decompressed, limiting evaluation.  Lack of oral contrast limits assessment of the bowel. ABDOMINAL WALL:  Postsurgical changes are present of the anterior abdominal wall with a small umbilical hernia likely present containing fat measuring in the range of 1.9 x 1.6 cm. BONES:  No bony lesion or fracture. PELVIC ORGANS:  Uterus and ovaries are not visualized.  There remains a collection within the left hemipelvis measuring in the range of 2.8 x 5.3 cm.  This measures slightly smaller than on the prior exam.  Adjacent surgical clips are noted. LUNG BASES:  No visible pulmonary or pleural disease.  OTHER:  Negative.             CONCLUSION:  No significant change in mild left hydroureteronephrosis which may be due to mass effect caused by a collection within the left hemipelvis.  The collection is stable to slightly decreased in prominence from the prior exam.    LOCATION:  [id number]   Dictated by (CST): Darrell Penn MD on 6/12/2024 at 9:01 PM     Finalized by (CST): Darrell Penn MD on 6/12/2024 at 9:09 PM       CT ABDOMEN+PELVIS(CPT=74176)    Result Date: 5/30/2024  PROCEDURE:  CT ABDOMEN+PELVIS (CPT=74176)  COMPARISON:  [facility] CT, CT CHEST+ABDOMEN+PELVIS(CPT=71250/96076), 5/30/2024, 12:07 PM.  INDICATIONS:  fever and vomiting last night- brought over from surgical oncology  TECHNIQUE:   Unenhanced multislice CT scanning was performed from the dome of the diaphragm to the pubic symphysis.  Dose reduction techniques were used. Dose information is transmitted to the ACR (American College of Radiology) NRDR (National Radiology Data Registry) which includes the Dose Index Registry.  PATIENT STATED HISTORY: (As transcribed by Technologist)   Fever and vomiting last night. Patient brought over from surgical oncology.    FINDINGS:    LIVER:  Unremarkable.  BILIARY:  Cholecystectomy  PANCREAS:  Unremarkable.  SPLEEN:  Unremarkable.  KIDNEYS:  New mild left hydronephrosis and hydroureter, no likely secondary to the left ureter being compromise as it courses along/through previously demonstrated postsurgical fluid collection and tissue thickening in the left pelvis adjacent to multiple surgical clips as shown on CT performed a few hours earlier.  No kidney stone identified.  The right kidney shows no hydronephrosis.  ADRENALS:  Unremarkable.  AORTA/VASCULAR:  No aortic aneurysm.  RETROPERITONEUM:  Unremarkable.  BOWEL/MESENTERY:  No acute process.  Moderate amount of stool throughout the colon.  No sign of colitis, diverticulitis, bowel obstruction, free air, ascites, mesenteric adenopathy, mesenteric edema.  ABDOMINAL WALL:  Unremarkable.  URINARY BLADDER:  Limited assessment of the urinary bladder which contains only a small amount of urine at the time of scanning, but based on the appearance on this examination, no specific bladder abnormalities identified.   LYMPH NODES PELVIS:  Unremarkable.  PELVIC ORGANS:  Hysterectomy.  Stable appearing complex postsurgical collection and tissue thickening adjacent to surgical clips in the left pelvis unchanged from the earlier CT from today.  LUNG BASES:  No acute process.  BONES:  No acute abnormality.              CONCLUSION:  Compared with the prior CT performed just a few hours ago, there is now left-sided hydronephrosis, and hydroureter, mild, likely secondary  to compromise of the left ureter as it courses adjacent to or through the previously demonstrated and stable appearing postsurgical left pelvic complex fluid collection/tissue thickening and adjacent surgical clips.  Consider urology evaluation.  Otherwise, no major change since the prior.   LOCATION:  [id number]   Dictated by (CST): Jersey Barclay MD on 5/30/2024 at 4:39 PM     Finalized by (CST): Jersey Barclay MD on 5/30/2024 at 4:44 PM       XR CHEST AP PORTABLE  (CPT=71045)    Result Date: 5/30/2024  PROCEDURE:  XR CHEST AP PORTABLE  (CPT=71045)  TECHNIQUE:  AP chest radiograph was obtained.  COMPARISON:  [facility] , XR, XR CHEST AP PORTABLE  (CPT=71045), 5/12/2024, 5:05 PM.  INDICATIONS:  fever and vomiting last night- brought over from surgical oncology  PATIENT STATED HISTORY: (As transcribed by Technologist)    FINDINGS:  The heart is normal in size.  The lungs are clear of acute-appearing disease process.  The costophrenic angles are sharp.  There is no active disease seen on the basis of portable chest radiography.            CONCLUSION:  No active disease seen.  LOCATION:  [id number]      Dictated by (CST): Jersey Barclay MD on 5/30/2024 at 2:38 PM     Finalized by (CST): Jersey Barclay MD on 5/30/2024 at 2:38 PM       CT CHEST+ABDOMEN+PELVIS(CPT=71250/45204)    Result Date: 5/30/2024  PROCEDURE:  CT CHEST+ABDOMEN+PELVIS(CPT=71250/73580)  COMPARISON:  [facility] , XR, XR ABDOMEN (1 VIEW) (CPT=74018), 5/14/2024, 8:09 AM.  [facility] , CT, CT ABDOMEN+PELVIS(CPT=74176), 5/14/2024, 10:36 AM.  INDICATIONS:  D72.829 Leukocytosis, unspecified type M79.89 Mass of soft tissue of pelvis  TECHNIQUE:  Following oral contrast administration, unenhanced multislice CT scanning is performed through the chest, abdomen, and pelvis.  Dose reduction techniques were used. Dose information is transmitted to the ACR (American College of Radiology) NRDR (National Radiology Data Registry) which includes the Dose Index Registry.  PATIENT STATED  HISTORY: (As transcribed by Technologist)  Patient presents with nausea, vomiting, and difficulty breathing. Recent history of pneumonia. Hx mass of soft tissue Pelvis    FINDINGS:  Sensitivity decreased without IV or oral contrast.  CHEST:  LUNGS:  Decreasing opacities at both lung bases. MEDIASTINUM:  No adenopathy. RICK:  No adenopathy. CARDIAC:  No pericardial effusion.  Coronary calcifications are present. PLEURA:  No pleural effusion. CHEST WALL:  No axillary adenopathy.  Left mastectomy changes. AORTA:  Tortuosity and ectasia.  Ascending diameter 3.8 cm.  Corresponding descending diameter 2.5 cm. VASCULATURE:  Smooth tapering.  ABDOMEN/PELVIS: LIVER:  Uniform parenchyma. BILIARY:  Surgically absent gallbladder.  No biliary dilatation. PANCREAS:  Uniform parenchyma.  No ductal dilatation. SPLEEN:  Not enlarged. KIDNEYS:  Normal anatomic positions.  No hydronephrosis.  No renal calculus disease. ADRENALS:  Nodularity bilaterally, without a discrete nodule. AORTA:  No abdominal aortic aneurysm.  Mild calcified atherosclerosis. RETROPERITONEUM:  No adenopathy.  A few nonenlarged lymph nodes appear similar to the prior. BOWEL/MESENTERY:  Normal bowel caliber.  Redundant colon.  No new colonic inflammation.  No free air.  No generalized ascites. ABDOMINAL WALL:  Midline laparotomy changes.  No fluid collection or hematoma.   A few drops of subcutaneous air in the left lower quadrant. URINARY BLADDER:  Nondistended. PELVIC NODES:  None enlarged. PELVIC ORGANS:  Surgically absent uterus.  There is a focal fluid collection adjacent to surgical clips in the posterior left pelvis.  5.2 x 3.4 cm.  No gas within.  Mild edema edema within the pelvis, increased. BONES:  Scattered, overall moderate degenerative changes.  Maintained vertebral body heights.  No subluxation.             CONCLUSION:  1. There is a focal fluid collection adjacent to surgical clips in the left posterior pelvis.  This may be a lymphocele or seroma.   An infected collection would be difficult to exclude, however there is no air within or other more specific features of abscess. 2. Improved aeration to the lung bases.  Mild residual atelectasis.  The potential for pneumonitis should be correlated with clinical suspicion for infection. 3. Details as above.  Continued clinical correlation recommended.  Report called by the radiology staff.      LOCATION:  [place]    Dictated by (CST): Macho Menjivar MD on 5/30/2024 at 1:07 PM     Finalized by (CST): Macho Menjivar MD on 5/30/2024 at 1:18 PM           Impression:   Recommendations:  Hydronephrosis  - will check cr and urine culture today  - discussed if cr at baseline, no increased infxn frequency, urinary sx at baseline, and no significant flank pain, can monitor in 6mos with BMP and KBUS   - if any of the above occurs, will proceed with stent placement   - will get microscopic and if pos for blood, discussed need for CTU and cysto  - increase bowel regimen to miralax daily and see if this improves flank pain     Thank you very much for this consult. Please call if there are any questions or concerns.     María Capps PA-C  Urology  [facility]  Phone: [phone]    Date: 6/26/2024  Time: 10:37 AM

## 2024-07-03 ENCOUNTER — TELEPHONE (OUTPATIENT)
Dept: SURGERY | Facility: CLINIC | Age: 64
End: 2024-07-03

## 2024-07-03 NOTE — TELEPHONE ENCOUNTER
Patients sister would like to go over the patients recent lab results. Patients sister states that the patients phone is not working at this time.

## 2024-07-05 NOTE — TELEPHONE ENCOUNTER
7/3/2024 Per  María Capps PA-C can let them know everything looks good. No blood or infection in urine. Kidney function looks good.    Will plan to repeat bloodwork and renal bladder ultrasound in 6 months as discussed previously. They'll need to schedule the US. If symptomatic in the meantime (UTI or increased flank pain) she'll need to be seen back in office.     7/5/2024 11:33Am - No answer / No Machine.    7/5/2024 LM voicemailSharbraden Johnson (sister) that I am returning call and please contact office [phone].

## 2025-03-03 ENCOUNTER — TELEPHONE (OUTPATIENT)
Dept: SURGERY | Facility: CLINIC | Age: 65
End: 2025-03-03

## 2025-03-03 NOTE — TELEPHONE ENCOUNTER
Patient was scheduled on 3/3/25 and was a no show for follow up visit with Lita garcia. Tried calling pt and was unable to reach patient.    2nd no show:   12/27/24  3/3/25

## 2025-03-07 DIAGNOSIS — Z12.31 ENCOUNTER FOR SCREENING MAMMOGRAM FOR MALIGNANT NEOPLASM OF BREAST: Primary | ICD-10-CM

## 2025-04-10 DIAGNOSIS — M54.42 LUMBAGO WITH SCIATICA, LEFT SIDE: ICD-10-CM

## 2025-04-10 DIAGNOSIS — M96.1 POSTLAMINECTOMY SYNDROME, NOT ELSEWHERE CLASSIFIED: ICD-10-CM

## 2025-04-10 DIAGNOSIS — M54.16 RADICULOPATHY OF LUMBAR REGION: Primary | ICD-10-CM

## 2025-04-29 ENCOUNTER — TELEPHONE (OUTPATIENT)
Dept: GASTROENTEROLOGY | Age: 65
End: 2025-04-29

## 2025-05-03 ENCOUNTER — HOSPITAL ENCOUNTER (OUTPATIENT)
Dept: GASTROENTEROLOGY | Age: 65
End: 2025-05-03
Attending: ANESTHESIOLOGY

## 2025-05-03 ENCOUNTER — HOSPITAL ENCOUNTER (OUTPATIENT)
Dept: GENERAL RADIOLOGY | Age: 65
End: 2025-05-03
Attending: ANESTHESIOLOGY

## 2025-05-03 VITALS
RESPIRATION RATE: 14 BRPM | WEIGHT: 197.4 LBS | HEIGHT: 62 IN | OXYGEN SATURATION: 97 % | DIASTOLIC BLOOD PRESSURE: 109 MMHG | HEART RATE: 68 BPM | BODY MASS INDEX: 36.33 KG/M2 | SYSTOLIC BLOOD PRESSURE: 177 MMHG | TEMPERATURE: 98.7 F

## 2025-05-03 DIAGNOSIS — M54.42 LUMBAGO WITH SCIATICA, LEFT SIDE: ICD-10-CM

## 2025-05-03 DIAGNOSIS — M54.16 RADICULOPATHY OF LUMBAR REGION: ICD-10-CM

## 2025-05-03 DIAGNOSIS — M96.1 POSTLAMINECTOMY SYNDROME, NOT ELSEWHERE CLASSIFIED: ICD-10-CM

## 2025-05-03 DIAGNOSIS — Z92.241 S/P EPIDURAL STEROID INJECTION: ICD-10-CM

## 2025-05-03 PROCEDURE — 10002801 HB RX 250 W/O HCPCS: Performed by: ANESTHESIOLOGY

## 2025-05-03 PROCEDURE — 99152 MOD SED SAME PHYS/QHP 5/>YRS: CPT

## 2025-05-03 PROCEDURE — 13000067 HB PAIN MANAGEMENT GROUP 2

## 2025-05-03 PROCEDURE — C1755 CATHETER, INTRASPINAL: HCPCS

## 2025-05-03 PROCEDURE — 10006023 HB SUPPLY 272

## 2025-05-03 PROCEDURE — 10002800 HB RX 250 W HCPCS: Performed by: ANESTHESIOLOGY

## 2025-05-03 PROCEDURE — 13000001 HB PHASE II RECOVERY EA 30 MINUTES

## 2025-05-03 PROCEDURE — 10005281 FL INTRAOPERATIVE C ARM NO REPORT

## 2025-05-03 RX ORDER — LEVOTHYROXINE SODIUM 100 UG/1
TABLET ORAL
COMMUNITY

## 2025-05-03 RX ORDER — DEXAMETHASONE SODIUM PHOSPHATE 4 MG/ML
INJECTION, SOLUTION INTRA-ARTICULAR; INTRALESIONAL; INTRAMUSCULAR; INTRAVENOUS; SOFT TISSUE PRN
Status: COMPLETED | OUTPATIENT
Start: 2025-05-03 | End: 2025-05-03

## 2025-05-03 RX ORDER — HYDROMORPHONE HYDROCHLORIDE 4 MG/1
TABLET ORAL
COMMUNITY

## 2025-05-03 RX ORDER — TELMISARTAN AND HYDROCHLORTHIAZIDE 80; 12.5 MG/1; MG/1
TABLET ORAL
COMMUNITY

## 2025-05-03 RX ORDER — TRAMADOL HYDROCHLORIDE 50 MG/1
TABLET ORAL
COMMUNITY

## 2025-05-03 RX ORDER — BUPIVACAINE HYDROCHLORIDE 2.5 MG/ML
INJECTION, SOLUTION EPIDURAL; INFILTRATION; INTRACAUDAL; PERINEURAL PRN
Status: COMPLETED | OUTPATIENT
Start: 2025-05-03 | End: 2025-05-03

## 2025-05-03 RX ORDER — MIDAZOLAM HYDROCHLORIDE 1 MG/ML
INJECTION, SOLUTION INTRAMUSCULAR; INTRAVENOUS PRN
Status: COMPLETED | OUTPATIENT
Start: 2025-05-03 | End: 2025-05-03

## 2025-05-03 RX ORDER — DULOXETIN HYDROCHLORIDE 60 MG/1
CAPSULE, DELAYED RELEASE ORAL
COMMUNITY
Start: 2025-03-26

## 2025-05-03 RX ORDER — GABAPENTIN 600 MG/1
600 TABLET ORAL 3 TIMES DAILY
COMMUNITY
Start: 2025-04-08

## 2025-05-03 RX ORDER — FENTANYL 25 UG/1
1 PATCH TRANSDERMAL
COMMUNITY
Start: 2025-02-18

## 2025-05-03 RX ORDER — LIDOCAINE HYDROCHLORIDE 10 MG/ML
INJECTION, SOLUTION INFILTRATION; PERINEURAL PRN
Status: COMPLETED | OUTPATIENT
Start: 2025-05-03 | End: 2025-05-03

## 2025-05-03 RX ORDER — AMLODIPINE BESYLATE 2.5 MG/1
TABLET ORAL
COMMUNITY

## 2025-05-03 RX ADMIN — MIDAZOLAM HYDROCHLORIDE 2 MG: 1 INJECTION, SOLUTION INTRAMUSCULAR; INTRAVENOUS at 09:13

## 2025-05-03 RX ADMIN — DEXAMETHASONE SODIUM PHOSPHATE 10 MG: 4 INJECTION INTRA-ARTICULAR; INTRALESIONAL; INTRAMUSCULAR; INTRAVENOUS; SOFT TISSUE at 09:17

## 2025-05-03 RX ADMIN — LIDOCAINE HYDROCHLORIDE 10 ML: 10 INJECTION, SOLUTION INFILTRATION; PERINEURAL at 09:16

## 2025-05-03 RX ADMIN — BUPIVACAINE HYDROCHLORIDE 10 ML: 2.5 INJECTION, SOLUTION EPIDURAL; INFILTRATION; INTRACAUDAL at 09:17

## 2025-05-03 ASSESSMENT — PAIN SCALES - GENERAL
PAINLEVEL_OUTOF10: 9
PAINLEVEL_OUTOF10: 8

## (undated) DEVICE — CLIP LIG M BLU TI HRT SHP WRE HORZ 600 PER BX

## (undated) DEVICE — UNIVERSAL STAPLER: Brand: ENDO GIA ULTRA

## (undated) DEVICE — CLIP INT USE SM TI LIG HORZ

## (undated) DEVICE — CLIP LIG M BLU TI HRT SHP WIRE HORZ

## (undated) DEVICE — C-ARM: Brand: UNBRANDED

## (undated) DEVICE — APPLICATOR PREP 26ML CHG 2% ISO ALC 70%

## (undated) DEVICE — SOLUTION IRRIG 1000ML H2O PIC PLAS

## (undated) DEVICE — MARKER SKIN PREP RESIST STRL

## (undated) DEVICE — GLOVE SUR 6.5 SENSICARE PI PIP CRM PWD F

## (undated) DEVICE — TIP CLEANER: Brand: VALLEYLAB

## (undated) DEVICE — SUT PROL 2-0 48IN MH NABSRB BLU L36MM 1/2 CIR

## (undated) DEVICE — BLADE ELECTRODE: Brand: EDGE

## (undated) DEVICE — SUT PROL 4-0 36IN RB-1 NABSRB BLU 17MM 1/2 CI

## (undated) DEVICE — SINGLE-USE CUT/COAGULATION HANDPIECE FOR OPEN SURGERY FOR THE PLASMAJET SYSTEM: Brand: PLASMAJET NEUTRAL PLASMA SURGERY SYSTEM

## (undated) DEVICE — LIGACLIP MCA MULTIPLE CLIP APPLIERS, 30 MEDIUM CLIPS: Brand: LIGACLIP

## (undated) DEVICE — ZZDISC - USE 405166-SUT COAT VCRL 3-0 27IN SH ABSRB UD 26MM 1/2

## (undated) DEVICE — 12CM IQ STANDARD: Brand: SONOPET IQ

## (undated) DEVICE — SUT PERMA- 2-0 30IN NABSRB BLK TIE SILK

## (undated) DEVICE — SUT PDS II 1 96IN TP-1 ABSRB VLT L65MM 1/2

## (undated) DEVICE — PAD SACRAL SPAN AID

## (undated) DEVICE — STERILE SYNTHETIC POLYISOPRENE POWDER-FREE SURGICAL GLOVES WITH HYDROGEL COATING, SMOOTH FINISH, STRAIGHT FINGER: Brand: PROTEXIS

## (undated) DEVICE — LAPAROTOMY SPONGE - RF AND X-RAY DETECTABLE PRE-WASHED: Brand: SITUATE

## (undated) DEVICE — ABC BEND-A-BEAM 3"(7.6CM) HANDCONTROL MALLEABLE HANDPIECE: Brand: ABC BEND-A-BEAM

## (undated) DEVICE — UNDYED BRAIDED (POLYGLACTIN 910), SYNTHETIC ABSORBABLE SUTURE: Brand: COATED VICRYL

## (undated) DEVICE — COVER LT HNDL RIG FOR SUR CAM DISP

## (undated) DEVICE — SHEET,DRAPE,40X58,STERILE: Brand: MEDLINE

## (undated) DEVICE — SUT PROL 4-0 36IN SH NABSRB BLU 26MM 1/2 CIR

## (undated) DEVICE — ABSORBABLE HEMOSTAT (OXIDIZED REGENERATED CELLULOSE): Brand: SURGICEL

## (undated) DEVICE — GLOVE SUR 7.5 SENSICARE PI PIP CRM PWD F

## (undated) DEVICE — PROXIMATE SKIN STAPLERS (35 WIDE) CONTAINS 35 STAINLESS STEEL STAPLES (FIXED HEAD): Brand: PROXIMATE

## (undated) DEVICE — PAD,NON-ADHERENT,3X8,STERILE,LF,1/PK: Brand: MEDLINE

## (undated) DEVICE — GLOVE SUR 8 SENSICARE PI PIP GRN PWD F

## (undated) DEVICE — C-ARMOR C-ARM EQUIPMENT COVERS CLEAR STERILE UNIVERSAL FIT 12 PER CASE: Brand: C-ARMOR

## (undated) DEVICE — SUT PERMA- 0 30IN NABSRB BLK TIE SILK

## (undated) DEVICE — SOLUTION RUBBING 4OZ 70% ISO ALC CLR

## (undated) DEVICE — HOLSTER ALL LENGTH

## (undated) DEVICE — PROVIDES A STERILE INTERFACE BETWEEN THE OPERATING ROOM SURGICAL LAMPS (NON-STERILE) AND THE SURGEON OR NURSE (STERILE).: Brand: STERION®CLAMP COVER FABRIC

## (undated) DEVICE — COVER,TABLE,44X90,STERILE: Brand: MEDLINE

## (undated) DEVICE — SUT PERMA- 3-0 30IN NABSRB BLK TIE SILK

## (undated) DEVICE — CURVED JAW CORDLESS ULTRASONIC DISSECTOR: Brand: SONICISION 7

## (undated) DEVICE — CASSETTE DRAPE: Brand: UNBRANDED

## (undated) DEVICE — 3M™ IOBAN™ 2 ANTIMICROBIAL INCISE DRAPE 6648EZ: Brand: IOBAN™ 2

## (undated) DEVICE — CONTAINER,SPECIMEN,PNEU TUBE,4OZ,OR STRL: Brand: MEDLINE

## (undated) DEVICE — SUT PERMA- 2-0 30IN SH NABSRB BLK L26MM 1/

## (undated) DEVICE — E-Z CLEAN, NON-STICK, PTFE COATED, ELECTROSURGICAL BLADE ELECTRODE, MODIFIED EXTENDED INSULATION, 4 INCH (10.2 CM): Brand: MEGADYNE

## (undated) DEVICE — IRRIGATION SUCTION CASSETTE: Brand: SONOPET IQ

## (undated) DEVICE — HIPEC PACK: Brand: MEDLINE INDUSTRIES, INC.

## (undated) DEVICE — SUT PROL SZ 5-0 36IN RB-1 NABSRB BL

## (undated) DEVICE — SUT PDS II 0 L60IN ABSRB VLT L48MM CTX 1/2

## (undated) DEVICE — INTENDED TO BE USED TO OCCLUDE, RETRACT AND IDENTIFY ARTERIES, VEINS, TENDONS AND NERVES IN SURGICAL PROCEDURES: Brand: STERION®  VESSEL LOOP

## (undated) DEVICE — SUT PROL 3-0 36IN SH NABSRB BLU 26MM 1/2 CIR

## (undated) DEVICE — MONITORING NEUROPHYSIOLOGICAL

## (undated) DEVICE — ABSORBABLE HEMOSTAT (OXIDIZED REGENERATED CELLULOSE, U.S.P.): Brand: SURGICEL FIBRILLAR

## (undated) DEVICE — AGENT HEMSTAT 4X4IN OXIDIZED REGENERATED

## (undated) DEVICE — GLOVE SUR 8 SENSICARE PI PIP CRM PWD F

## (undated) DEVICE — SLEEVE COMPR MD KNEE LEN SGL USE KENDALL SCD

## (undated) DEVICE — INTELLIGENT RELOAD: Brand: TRI-STAPLE 2.0

## (undated) DEVICE — SPONGE: SPECIALTY PEANUT XR 100/CS: Brand: MEDICAL ACTION INDUSTRIES

## (undated) DEVICE — ELECTRODE ES 2.5IN PTFE CAUT TIP

## (undated) DEVICE — WRAP THERAPEUTIC BACK WO GEL P

## (undated) DEVICE — Device: Brand: INTELLICART™

## (undated) DEVICE — CLIP SUR SM TI HRT SHP WIRE HORZ LIG SYS

## (undated) DEVICE — SOLUTION IRRIG 1000ML 0.9% NACL USP BTL

## (undated) DEVICE — GLOVE SUR 8 SENSICARE PIP WHT PWD F

## (undated) DEVICE — KIT HEMSTAT MTRX 8ML PORCINE GEL HUM THROM

## (undated) DEVICE — ELECTRODE ES L10.2CM BLDE L4IN EXT MPLR OPN

## (undated) DEVICE — TUBING MEGADYNE SPECULUM

## (undated) DEVICE — ELECTRODE ES L16.5CM BLDE MPLR OPN APPRCH EZ

## (undated) DEVICE — CODMAN® SURGICAL PATTIES 1/2" X1 1/2" (1.27CM X 3.81CM): Brand: CODMAN®

## (undated) DEVICE — SYRINGE 10CC LL TIP CONV PAK

## (undated) DEVICE — 3M™ TEGADERM™ TRANSPARENT FILM DRESSING FRAME STYLE, 1626W, 4 IN X 4-3/4 IN (10 CM X 12 CM), 50/CT 4CT/CASE: Brand: 3M™ TEGADERM™

## (undated) DEVICE — CLIP INT L ORNG TI TRNSVRS GRV CHEVRON SHP